# Patient Record
Sex: FEMALE | Race: OTHER | NOT HISPANIC OR LATINO | Employment: UNEMPLOYED | URBAN - METROPOLITAN AREA
[De-identification: names, ages, dates, MRNs, and addresses within clinical notes are randomized per-mention and may not be internally consistent; named-entity substitution may affect disease eponyms.]

---

## 2017-10-19 LAB
HBA1C MFR BLD: 5.5 % OF TOTAL HGB
TSH SERPL-ACNC: 1.88 MIU/L

## 2018-04-03 LAB
CHOLEST SERPL-MCNC: 264 MG/DL
CHOLEST/HDLC SERPL: 6.6 (CALC)
HDLC SERPL-MCNC: 40 MG/DL
LDLC SERPL CALC-MCNC: 174 MG/DL (CALC)
NONHDLC SERPL-MCNC: 224 MG/DL (CALC)
TRIGL SERPL-MCNC: 294 MG/DL

## 2018-11-20 ENCOUNTER — OFFICE VISIT (OUTPATIENT)
Dept: FAMILY MEDICINE CLINIC | Facility: CLINIC | Age: 53
End: 2018-11-20
Payer: COMMERCIAL

## 2018-11-20 VITALS
BODY MASS INDEX: 29.74 KG/M2 | WEIGHT: 157.4 LBS | DIASTOLIC BLOOD PRESSURE: 90 MMHG | TEMPERATURE: 98 F | HEART RATE: 90 BPM | RESPIRATION RATE: 16 BRPM | SYSTOLIC BLOOD PRESSURE: 132 MMHG

## 2018-11-20 DIAGNOSIS — J31.0 RHINITIS, UNSPECIFIED TYPE: Primary | ICD-10-CM

## 2018-11-20 PROCEDURE — 90471 IMMUNIZATION ADMIN: CPT

## 2018-11-20 PROCEDURE — 99213 OFFICE O/P EST LOW 20 MIN: CPT | Performed by: FAMILY MEDICINE

## 2018-11-20 PROCEDURE — 90686 IIV4 VACC NO PRSV 0.5 ML IM: CPT

## 2018-11-20 PROCEDURE — 1036F TOBACCO NON-USER: CPT | Performed by: FAMILY MEDICINE

## 2018-11-20 RX ORDER — OMEPRAZOLE 40 MG/1
CAPSULE, DELAYED RELEASE ORAL
COMMUNITY
Start: 2017-09-05 | End: 2018-11-30 | Stop reason: SDUPTHER

## 2018-11-20 RX ORDER — PREDNISONE 20 MG/1
40 TABLET ORAL DAILY
Qty: 14 TABLET | Refills: 0 | Status: SHIPPED | OUTPATIENT
Start: 2018-11-20 | End: 2018-11-27

## 2018-11-20 RX ORDER — LEVOTHYROXINE SODIUM 0.03 MG/1
TABLET ORAL
COMMUNITY
Start: 2010-03-31 | End: 2019-03-14 | Stop reason: SDUPTHER

## 2018-11-20 RX ORDER — FLUTICASONE PROPIONATE 50 MCG
1 SPRAY, SUSPENSION (ML) NASAL 2 TIMES DAILY
Qty: 16 G | Refills: 12 | Status: SHIPPED | OUTPATIENT
Start: 2018-11-20 | End: 2019-01-25 | Stop reason: SDUPTHER

## 2018-11-20 NOTE — PROGRESS NOTES
Assessment/Plan:    No problem-specific Assessment & Plan notes found for this encounter  Diagnoses and all orders for this visit:    Rhinitis, unspecified type  -     influenza vaccine, 6198-9730, quadrivalent, 0 5 mL, preservative-free (SYRINGE, SINGLE-DOSE VIAL), for adult and pediatric patients 3 yr+ (AFLURIA, FLUARIX, FLULAVAL, FLUZONE)  -     fluticasone (FLONASE) 50 mcg/act nasal spray; 1 spray into each nostril 2 (two) times a day  -     predniSONE 20 mg tablet; Take 2 tablets (40 mg total) by mouth daily for 7 days    Other orders  -     fexofenadine (ALLEGRA ODT) 30 MG disintegrating tablet; Take  -     levothyroxine 25 mcg tablet; 1 TAB DAILY  -     omeprazole (PriLOSEC) 40 MG capsule; TAKE 1 CAPSULE BY ORAL ROUTE EVERY DAY BEFORE A MEAL          Subjective:      Patient ID: Cris Dunham is a 52 y o  female  Patient comes for evaluation of recurring and chronic nasal congestion  She is using Allegra often on she is using a steroid nasal spray daily  She uses Afrin almost every day  The congestion is associated with headache and facial pressure  No ear symptoms and not much mucus  She does not have much postnasal drip  The following portions of the patient's history were reviewed and updated as appropriate: allergies, current medications, past family history, past medical history, past social history, past surgical history and problem list     Review of Systems   HENT: Positive for sinus pain and sinus pressure  Negative for ear pain, facial swelling, hearing loss, postnasal drip, rhinorrhea, sneezing and sore throat  Eyes: Negative for itching  Objective:  Vitals:    11/20/18 0837   BP: 132/90   BP Location: Left arm   Patient Position: Sitting   Cuff Size: Adult   Pulse: 90   Resp: 16   Temp: 98 °F (36 7 °C)   Weight: 71 4 kg (157 lb 6 4 oz)      Physical Exam   Constitutional: She appears well-developed and well-nourished     HENT:   Moderate nasal congestion ; no polyps / deviated septum   Eyes: Conjunctivae are normal    Neck: Neck supple  No thyromegaly present  Pulmonary/Chest: Effort normal    Skin: Skin is warm and dry  Psychiatric: Thought content normal          Most of the issue is chronic nasal congestion possibly aggravated by over use of topical decongestants

## 2018-11-20 NOTE — PATIENT INSTRUCTIONS
Continue Allegra but take it every day  Stop the Afrin and stop all other nasal sprays except as noted below  Continue the Flonase but use 1 spray in each nostril morning and evening every day  Take prednisone for 1 week   some Ponaris  nasal lubricant  Use this at least 2 or 3 times every day as a good nasal lubricant  Thing should be much better in 2 weeks I would recommend continuing the treatment for at least 3-4 months  Influenza Vaccine   WHAT YOU NEED TO KNOW:   What is the influenza vaccine? The influenza vaccine is an injection given to help prevent influenza (flu)  The flu is caused by a virus  The virus spreads from person to person through coughing and sneezing  Several types of viruses cause the flu  The viruses change over time, so new vaccines are made each year  The vaccine begins to protect you about 2 weeks after you get it  The flu shot usually injected into your upper arm  It may be given in your thigh  You may get a vaccine with a weak or dead virus  When should I get the influenza vaccine? The influenza vaccine is offered every year starting in September or October  Get the influenza vaccine as soon as it is available  Children 6 months to 6years old need 2 doses during the first year they get the vaccine  The 2 doses should be given at least 4 weeks apart  It is best if the same type of vaccine is given both times  The child can then receive 1 dose each year  Children 9 years or older should get 1 dose each year  Who should get the flu shot?    · Infants 6 months or older    · Any healthy adult who would like to decrease the risk for the flu    · Anyone living with or caring for children younger than 5 years     · Healthcare workers    · Anyone who lives in a long-term care facility    · Anyone who has chronic health problems, such as asthma, diabetes, or blood disorders    · Anyone who has a weak immune system    · Women who are or will be pregnant during the flu season  Who should not get the flu shot? If you have an egg allergy, ask your healthcare provider if it is safe to get the flu shot  You will need to be closely monitored by a healthcare provider while you receive the vaccine, and for an hour or more after  The following should not get the flu shot:  · Infants younger than 6 months     · Anyone who has had an allergic reaction to the flu shot    · Anyone who is sick or has a fever    · Anyone who received a diagnosis of Guillain-Barré syndrome within 6 weeks of getting a flu vaccine    · Anyone who is allergic to thimerosal (mercury)  What are the risks of the influenza vaccine? The flu shot may cause mild symptoms, such as a fever, headache, and muscle aches  It may also cause mild to moderate soreness or redness at the area where you were given the shot  You may still get the flu after you receive the influenza vaccine  If you are allergic to eggs, ask about an egg-free vaccine  You may have an allergic reaction to the vaccine  This can be life-threatening  Call 911 for any of the following:   · Your mouth and throat are swollen  · You are wheezing or have trouble breathing  · You have chest pain or your heart is beating faster than normal for you  · You feel like you are going to faint  When should I seek immediate care? · Your face is red or swollen  · You have hives that spread over your body  · You feel weak or dizzy  When should I contact my healthcare provider? · You have increased pain, redness, or swelling around the area where the shot was given  · You have questions or concerns about the influenza vaccine  CARE AGREEMENT:   You have the right to help plan your care  Learn about your health condition and how it may be treated  Discuss treatment options with your caregivers to decide what care you want to receive  You always have the right to refuse treatment  The above information is an  only   It is not intended as medical advice for individual conditions or treatments  Talk to your doctor, nurse or pharmacist before following any medical regimen to see if it is safe and effective for you  © 2017 [employer] Information is for End User's use only and may not be sold, redistributed or otherwise used for commercial purposes  All illustrations and images included in CareNotes® are the copyrighted property of [employer]  or [employer] Maldon[employer]

## 2018-11-30 DIAGNOSIS — K21.9 GASTROESOPHAGEAL REFLUX DISEASE WITHOUT ESOPHAGITIS: Primary | ICD-10-CM

## 2018-11-30 RX ORDER — OMEPRAZOLE 40 MG/1
40 CAPSULE, DELAYED RELEASE ORAL DAILY
Qty: 30 CAPSULE | Refills: 6 | Status: SHIPPED | OUTPATIENT
Start: 2018-11-30 | End: 2019-05-20 | Stop reason: SDUPTHER

## 2018-12-26 ENCOUNTER — OFFICE VISIT (OUTPATIENT)
Dept: FAMILY MEDICINE CLINIC | Facility: CLINIC | Age: 53
End: 2018-12-26
Payer: COMMERCIAL

## 2018-12-26 VITALS
DIASTOLIC BLOOD PRESSURE: 90 MMHG | WEIGHT: 159.1 LBS | TEMPERATURE: 98.8 F | SYSTOLIC BLOOD PRESSURE: 130 MMHG | BODY MASS INDEX: 30.04 KG/M2 | HEIGHT: 61 IN | OXYGEN SATURATION: 97 % | HEART RATE: 103 BPM

## 2018-12-26 DIAGNOSIS — R05.9 COUGH: Primary | ICD-10-CM

## 2018-12-26 PROCEDURE — 99213 OFFICE O/P EST LOW 20 MIN: CPT | Performed by: FAMILY MEDICINE

## 2018-12-26 PROCEDURE — 1036F TOBACCO NON-USER: CPT | Performed by: FAMILY MEDICINE

## 2018-12-26 RX ORDER — AMOXICILLIN AND CLAVULANATE POTASSIUM 875; 125 MG/1; MG/1
1 TABLET, FILM COATED ORAL EVERY 12 HOURS SCHEDULED
Qty: 14 TABLET | Refills: 0 | Status: SHIPPED | OUTPATIENT
Start: 2018-12-26 | End: 2019-01-02

## 2018-12-26 RX ORDER — ALBUTEROL SULFATE 90 UG/1
2 AEROSOL, METERED RESPIRATORY (INHALATION) EVERY 4 HOURS PRN
Qty: 1 INHALER | Refills: 12 | Status: SHIPPED | OUTPATIENT
Start: 2018-12-26 | End: 2019-09-18 | Stop reason: ALTCHOICE

## 2018-12-26 RX ORDER — LORATADINE 10 MG/1
TABLET ORAL EVERY 24 HOURS
COMMUNITY
Start: 2016-03-31 | End: 2019-09-18 | Stop reason: ALTCHOICE

## 2018-12-26 NOTE — PROGRESS NOTES
Assessment/Plan:    No problem-specific Assessment & Plan notes found for this encounter  Diagnoses and all orders for this visit:    Cough  -     amoxicillin-clavulanate (AUGMENTIN) 875-125 mg per tablet; Take 1 tablet by mouth every 12 (twelve) hours for 7 days  -     albuterol (PROAIR HFA) 90 mcg/act inhaler; Inhale 2 puffs every 4 (four) hours as needed for wheezing    Other orders  -     loratadine (CLARITIN) 10 mg tablet; every 24 hours          Subjective:      Patient ID: Dany Santana is a 52 y o  female  Patient comes with signs and symptoms of sinus infection for the past 4-5 days  No fever having discolored mucus from the sinuses  Some cough and phlegm as well  So far her asthma has not flared up  The following portions of the patient's history were reviewed and updated as appropriate: allergies, current medications, past family history, past medical history, past social history, past surgical history and problem list     Review of Systems   Constitutional: Negative for chills and fever  HENT: Positive for congestion, ear pain, rhinorrhea and sinus pain  Negative for sore throat  Eyes: Negative for visual disturbance  Respiratory: Positive for cough  Negative for shortness of breath and wheezing  Cardiovascular: Negative for chest pain  Gastrointestinal: Negative for constipation and diarrhea  Skin: Negative for rash  Objective:  Vitals:    12/26/18 1003   BP: 130/90   BP Location: Left arm   Patient Position: Sitting   Cuff Size: Adult   Pulse: 103   Temp: 98 8 °F (37 1 °C)   TempSrc: Temporal   SpO2: 97%   Weight: 72 2 kg (159 lb 1 6 oz)   Height: 5' 1" (1 549 m)      Physical Exam   Constitutional: She appears well-developed and well-nourished  HENT:   r o  Media    Eyes: Conjunctivae are normal    Pulmonary/Chest: Effort normal  She has wheezes  Skin: Skin is warm and dry     Psychiatric: Thought content normal          O  Media R side / sinusuitis Anti bx / Alok Porter

## 2018-12-26 NOTE — PATIENT INSTRUCTIONS
Finished a 1 week of antibiotics  You can add Afrin nasal spray for the facial pressure and sinus pain  It will also help the ear doctor opened up  Robitussin DM is good for cough  And Advils or Tylenol as are good for fever and pain  It would probably be good advice to use the inhaler maybe 3 times a day for a few days even if you do not feel tight

## 2019-01-23 ENCOUNTER — TELEPHONE (OUTPATIENT)
Dept: FAMILY MEDICINE CLINIC | Facility: CLINIC | Age: 54
End: 2019-01-23

## 2019-01-25 ENCOUNTER — OFFICE VISIT (OUTPATIENT)
Dept: FAMILY MEDICINE CLINIC | Facility: CLINIC | Age: 54
End: 2019-01-25
Payer: COMMERCIAL

## 2019-01-25 VITALS
TEMPERATURE: 97.6 F | BODY MASS INDEX: 29.62 KG/M2 | HEIGHT: 61 IN | SYSTOLIC BLOOD PRESSURE: 137 MMHG | RESPIRATION RATE: 16 BRPM | WEIGHT: 156.9 LBS | HEART RATE: 84 BPM | DIASTOLIC BLOOD PRESSURE: 78 MMHG

## 2019-01-25 DIAGNOSIS — J31.0 RHINITIS, UNSPECIFIED TYPE: ICD-10-CM

## 2019-01-25 PROCEDURE — 99213 OFFICE O/P EST LOW 20 MIN: CPT | Performed by: FAMILY MEDICINE

## 2019-01-25 PROCEDURE — 3008F BODY MASS INDEX DOCD: CPT | Performed by: FAMILY MEDICINE

## 2019-01-25 PROCEDURE — 1036F TOBACCO NON-USER: CPT | Performed by: FAMILY MEDICINE

## 2019-01-25 RX ORDER — OMEPRAZOLE 40 MG/1
CAPSULE, DELAYED RELEASE ORAL
Refills: 6 | COMMUNITY
Start: 2019-01-01 | End: 2019-01-25 | Stop reason: SDUPTHER

## 2019-01-25 RX ORDER — FLUTICASONE PROPIONATE 50 MCG
1 SPRAY, SUSPENSION (ML) NASAL 2 TIMES DAILY
Qty: 16 G | Refills: 12 | Status: SHIPPED | OUTPATIENT
Start: 2019-01-25 | End: 2020-04-11 | Stop reason: SDUPTHER

## 2019-01-25 NOTE — PROGRESS NOTES
Assessment/Plan:    No problem-specific Assessment & Plan notes found for this encounter  Diagnoses and all orders for this visit:    Rhinitis, unspecified type  -     fluticasone (FLONASE) 50 mcg/act nasal spray; 1 spray into each nostril 2 (two) times a day    Other orders  -     Discontinue: omeprazole (PriLOSEC) 40 MG capsule; TAKE 1 CAPSULE BY MOUTH EVERY DAY          Subjective:      Patient ID: Brant Rodrigues is a 52 y o  female  Patient comes for review of sinus congestion  She did well having finished the antibiotic her sinusitis symptoms improved promptly upon stopping the medicines of symptoms began to come back she is congested has difficulty breathing through her nose notices pressure on the cheeks above the eyes   Afrin does relieve the symptoms but she can't use that for ever  The following portions of the patient's history were reviewed and updated as appropriate: allergies, current medications, past family history, past medical history, past social history, past surgical history and problem list     Review of Systems   HENT: Positive for postnasal drip, sinus pain and sinus pressure  Negative for rhinorrhea  Respiratory: Negative for cough  Cardiovascular: Negative for chest pain  Objective:  Vitals:    01/25/19 0730   BP: 137/78   BP Location: Left arm   Patient Position: Sitting   Cuff Size: Standard   Pulse: 84   Resp: 16   Temp: 97 6 °F (36 4 °C)   Weight: 71 2 kg (156 lb 14 4 oz)   Height: 5' 1" (1 549 m)      Physical Exam   Constitutional: She appears well-developed and well-nourished  HENT:   Head: Normocephalic  Right Ear: External ear normal    Left Ear: External ear normal    Mouth/Throat: Oropharynx is clear and moist    Nasal congestion is noted bilateral no polyps are seen no adenopathy  Eyes: Conjunctivae are normal    Pulmonary/Chest: Effort normal    Skin: Skin is warm and dry     Psychiatric: Thought content normal          I believe the major issues here are chronic rhinitis there may be a component of allergy but there is also certainly a vasomotor component  We will start with humidification and anti-inflammatory treatment and possibly add oral anti allergy medicines later

## 2019-01-25 NOTE — PATIENT INSTRUCTIONS
Use Afrin spray for the next 3 or 4 days  Simultaneously use the new prescriptions spray 1 spray each nostril twice daily  You should be much improved within a week  Call me if that is not the case    Continue the prescription spray for 2 months and then try going off of it      You can also use the saline solution as often as necessary to keep the nose moist

## 2019-03-14 DIAGNOSIS — R51.9 NONINTRACTABLE HEADACHE, UNSPECIFIED CHRONICITY PATTERN, UNSPECIFIED HEADACHE TYPE: Primary | ICD-10-CM

## 2019-03-14 RX ORDER — LEVOTHYROXINE SODIUM 0.03 MG/1
25 TABLET ORAL DAILY
Qty: 90 TABLET | Refills: 3 | Status: SHIPPED | OUTPATIENT
Start: 2019-03-14 | End: 2020-03-09

## 2019-03-15 ENCOUNTER — TREATMENT (OUTPATIENT)
Dept: FAMILY MEDICINE CLINIC | Facility: CLINIC | Age: 54
End: 2019-03-15

## 2019-03-15 ENCOUNTER — TELEPHONE (OUTPATIENT)
Dept: FAMILY MEDICINE CLINIC | Facility: CLINIC | Age: 54
End: 2019-03-15

## 2019-03-15 DIAGNOSIS — R05.9 COUGH: Primary | ICD-10-CM

## 2019-03-15 RX ORDER — GUAIFENESIN AND CODEINE PHOSPHATE 100; 10 MG/5ML; MG/5ML
SOLUTION ORAL
Qty: 120 ML | Refills: 1 | Status: SHIPPED | OUTPATIENT
Start: 2019-03-15 | End: 2019-09-18 | Stop reason: ALTCHOICE

## 2019-03-15 NOTE — TELEPHONE ENCOUNTER
Patient called she said that she isn't feeling good for 4 days now has a cough she said that it is getting worse couldn't sleep last night she would like to know if she can be seen today or what to recommend for her she can be reached at [phone]  TY

## 2019-03-15 NOTE — TELEPHONE ENCOUNTER
So long as no fever I will call in a codeine-containing cough medicine to use over the weekend if not improving somewhat by Monday or Tuesday we will see her

## 2019-03-15 NOTE — TELEPHONE ENCOUNTER
Pt called back and was given the message regarding the medication and if not feeling well to call back  Pt uses [facility] on [address]  Pharmacy is on file

## 2019-03-17 RX ORDER — GUAIFENESIN AND CODEINE PHOSPHATE 100; 10 MG/5ML; MG/5ML
SOLUTION ORAL
Qty: 120 ML | Refills: 0 | Status: SHIPPED | OUTPATIENT
Start: 2019-03-17 | End: 2019-09-18 | Stop reason: ALTCHOICE

## 2019-05-20 DIAGNOSIS — K21.9 GASTROESOPHAGEAL REFLUX DISEASE WITHOUT ESOPHAGITIS: ICD-10-CM

## 2019-05-20 RX ORDER — OMEPRAZOLE 40 MG/1
40 CAPSULE, DELAYED RELEASE ORAL DAILY
Qty: 30 CAPSULE | Refills: 6 | Status: SHIPPED | OUTPATIENT
Start: 2019-05-20 | End: 2019-12-19 | Stop reason: SDUPTHER

## 2019-08-29 ENCOUNTER — TELEPHONE (OUTPATIENT)
Dept: FAMILY MEDICINE CLINIC | Facility: CLINIC | Age: 54
End: 2019-08-29

## 2019-08-29 NOTE — TELEPHONE ENCOUNTER
Pt called in states that its been a while since she's had blood work done and wants to know if Monica Patricio would like her to have done? Please advise thank you

## 2019-09-18 ENCOUNTER — OFFICE VISIT (OUTPATIENT)
Dept: FAMILY MEDICINE CLINIC | Facility: CLINIC | Age: 54
End: 2019-09-18
Payer: COMMERCIAL

## 2019-09-18 ENCOUNTER — TREATMENT (OUTPATIENT)
Dept: FAMILY MEDICINE CLINIC | Facility: CLINIC | Age: 54
End: 2019-09-18

## 2019-09-18 VITALS
HEART RATE: 77 BPM | DIASTOLIC BLOOD PRESSURE: 80 MMHG | BODY MASS INDEX: 29 KG/M2 | OXYGEN SATURATION: 96 % | HEIGHT: 62 IN | WEIGHT: 157.6 LBS | SYSTOLIC BLOOD PRESSURE: 122 MMHG | TEMPERATURE: 97.3 F

## 2019-09-18 DIAGNOSIS — J31.0 CHRONIC RHINITIS: ICD-10-CM

## 2019-09-18 DIAGNOSIS — E78.49 OTHER HYPERLIPIDEMIA: Chronic | ICD-10-CM

## 2019-09-18 DIAGNOSIS — R03.0 ELEVATED BLOOD-PRESSURE READING WITHOUT DIAGNOSIS OF HYPERTENSION: ICD-10-CM

## 2019-09-18 DIAGNOSIS — E03.9 ACQUIRED HYPOTHYROIDISM: ICD-10-CM

## 2019-09-18 DIAGNOSIS — K21.9 GASTROESOPHAGEAL REFLUX DISEASE WITHOUT ESOPHAGITIS: Chronic | ICD-10-CM

## 2019-09-18 DIAGNOSIS — Z23 IMMUNIZATION DUE: Primary | ICD-10-CM

## 2019-09-18 DIAGNOSIS — J30.1 ALLERGIC RHINITIS DUE TO POLLEN, UNSPECIFIED SEASONALITY: ICD-10-CM

## 2019-09-18 PROBLEM — R05.9 COUGH: Status: RESOLVED | Noted: 2018-12-26 | Resolved: 2019-09-18

## 2019-09-18 LAB
CHOLEST SERPL-MCNC: 255 MG/DL (ref 50–200)
GLUCOSE P FAST SERPL-MCNC: 96 MG/DL (ref 65–99)
HDLC SERPL-MCNC: 45 MG/DL (ref 40–60)
LDLC SERPL CALC-MCNC: 166 MG/DL (ref 0–100)
NONHDLC SERPL-MCNC: 210 MG/DL
TRIGL SERPL-MCNC: 218 MG/DL
TSH SERPL DL<=0.05 MIU/L-ACNC: 2.33 UIU/ML (ref 0.36–3.74)

## 2019-09-18 PROCEDURE — 84443 ASSAY THYROID STIM HORMONE: CPT | Performed by: FAMILY MEDICINE

## 2019-09-18 PROCEDURE — 36415 COLL VENOUS BLD VENIPUNCTURE: CPT | Performed by: FAMILY MEDICINE

## 2019-09-18 PROCEDURE — 90682 RIV4 VACC RECOMBINANT DNA IM: CPT

## 2019-09-18 PROCEDURE — 99396 PREV VISIT EST AGE 40-64: CPT | Performed by: FAMILY MEDICINE

## 2019-09-18 PROCEDURE — 80061 LIPID PANEL: CPT | Performed by: FAMILY MEDICINE

## 2019-09-18 PROCEDURE — 82947 ASSAY GLUCOSE BLOOD QUANT: CPT | Performed by: FAMILY MEDICINE

## 2019-09-18 PROCEDURE — 90471 IMMUNIZATION ADMIN: CPT

## 2019-09-18 NOTE — PROGRESS NOTES
Assessment/Plan:    No problem-specific Assessment & Plan notes found for this encounter  Diagnoses and all orders for this visit:    Immunization due  -     influenza vaccine, 2159-2830, quadrivalent, recombinant, PF, 0 5 mL, for patients 18 yr+ (FLUBLOK)    Allergic rhinitis due to pollen, unspecified seasonality    Chronic rhinitis    Elevated blood-pressure reading without diagnosis of hypertension    Acquired hypothyroidism  -     Lipid panel  -     TSH, 3rd generation  -     Glucose, fasting    Gastroesophageal reflux disease without esophagitis  -     Lipid panel  -     TSH, 3rd generation  -     Glucose, fasting    Other hyperlipidemia          Subjective:      Patient ID: Judy Winters is a 62 y o  female  Wellness     R TMJ sx since recent implant ;     PAP/MAMMO AND COLON UTD ;     FH : DM /     Yoga 2/ week       The following portions of the patient's history were reviewed and updated as appropriate: allergies, current medications, past family history, past medical history, past social history, past surgical history and problem list     Review of Systems   Constitutional: Negative for activity change and appetite change  HENT: Negative for voice change  Eyes: Negative for visual disturbance  Respiratory: Negative for chest tightness and shortness of breath  Cardiovascular: Negative for chest pain, palpitations and leg swelling  Gastrointestinal: Negative for abdominal pain, blood in stool, constipation and diarrhea  Genitourinary: Negative for dysuria, vaginal bleeding and vaginal discharge  Skin: Negative for rash  Neurological: Negative for dizziness  Psychiatric/Behavioral: Negative for dysphoric mood           Objective:  Vitals:    09/18/19 0728   BP: 122/80   BP Location: Left arm   Patient Position: Sitting   Cuff Size: Adult   Pulse: 77   Temp: (!) 97 3 °F (36 3 °C)   TempSrc: Temporal   SpO2: 96%   Weight: 71 5 kg (157 lb 9 6 oz)   Height: 5' 2" (1 575 m) Physical Exam   Constitutional: She appears well-developed and well-nourished  HENT:   Head: Normocephalic and atraumatic  Eyes: Conjunctivae are normal    Neck: Neck supple  No thyromegaly present  Cardiovascular: Normal rate, regular rhythm, normal heart sounds and intact distal pulses  No murmur heard  Pulmonary/Chest: Effort normal and breath sounds normal  No respiratory distress  Musculoskeletal: She exhibits no edema  Lymphadenopathy:     She has no cervical adenopathy  Skin: Skin is warm and dry  Psychiatric: She has a normal mood and affect  Her behavior is normal          Patient's chronic problems that were reviewed today are stable  Meds reviewed and no changes made  Appropriate labs and imaging were ordered  Preventive measures appropriate for age and sex were reviewed with patient  Immunizations were updated as appropriate

## 2019-09-18 NOTE — PATIENT INSTRUCTIONS
For the jaw joint pain try to avoid wide opening of your mouth to bite into fruits or very hard items  Also avoid chewing gum or real chewy candies that make you chew frequently  Take 2 Aleve twice a day for 5 days  That should calm down the jaw joint  That treatment is also good for the left wrist when you needed  On days when you take Aleve make sure you take the omeprazole to protect the stomach  You may try to reduce the Prilosec to every other day and see how it goes  You may use Afrin nasal spray on occasion for bad days with congestion in her nose  If the joint pain in the jaw does not get better after my treatment let me know I have other ideas  CURRENT [employer] TIPS ON EXERCISE:    IF YOU HAVE CONDITIONS THAT PREVENT AEROBIC EXERCISE:    WALK 30 MINUTES AT LEAST 5 DAYS PER WEEK; MAY BREAK IT UP INTO 10-  15 MINUTE SESSIONS   GET SOME RESISTANCE EXERCISES USING THE MAJOR MUSCLE GROUPS 2-3 TIMES PER WEEK     IF YOU ARE PHYSICALLY ABLE:    TRY TO GET 10,000 STEPS 3 TIMES PER WEEK  PLUS  GO "ONLINE" TO CHECK [employer] HEART RATE FOR YOUR AGE AND DO AEROBIC EXERCISES (JOG/STATIONARY BIKE/ELLIPTICAL) FOR 20-30 MINUTES 2-3 TIMES PER WEEK  IF YOU HAVE NEVER HAD A TDAP SHOT (TETANUS/DIPHTHERIA/PERTUSSIS)  TALK TO YOUR PHARMACIST ABOUT GETTING ONE : GOOD FOR 10 YRS:ESPECIALLY IMPORTATN IF YOU HAVE YOUNG CHILDREN OR GRANDCHILDREN    GET A YEARLY FLU SHOT IN THE FALL : IF OVER 65 GET "HIGH DOSE"     ASK PHARMACIST ABOUT "SHINGRIX" THE NEW SHINGLES VACCINE IF OVER AGE 50    You should take a calcium and vitamin-D supplement prevent osteoporosis  Start with Caltrate D 1 a day for 1 month  And then go to twice a day  Sometimes calcium can be constipating so take plenty of fluids fresh fruits fresh vegetables and high-fiber diet to prevent trouble

## 2019-09-20 ENCOUNTER — TELEPHONE (OUTPATIENT)
Dept: FAMILY MEDICINE CLINIC | Facility: CLINIC | Age: 54
End: 2019-09-20

## 2019-09-20 NOTE — TELEPHONE ENCOUNTER
Labs are all fairly good  Cholesterol is a bit high but no worse than before  Does not require a statin drug  Maintain good physical exercising keep her weight low

## 2019-12-19 DIAGNOSIS — K21.9 GASTROESOPHAGEAL REFLUX DISEASE WITHOUT ESOPHAGITIS: ICD-10-CM

## 2019-12-19 RX ORDER — OMEPRAZOLE 40 MG/1
40 CAPSULE, DELAYED RELEASE ORAL DAILY
Qty: 30 CAPSULE | Refills: 6 | Status: SHIPPED | OUTPATIENT
Start: 2019-12-19 | End: 2020-06-10

## 2020-03-09 DIAGNOSIS — R51.9 NONINTRACTABLE HEADACHE, UNSPECIFIED CHRONICITY PATTERN, UNSPECIFIED HEADACHE TYPE: ICD-10-CM

## 2020-03-09 RX ORDER — LEVOTHYROXINE SODIUM 0.03 MG/1
TABLET ORAL
Qty: 90 TABLET | Refills: 3 | Status: SHIPPED | OUTPATIENT
Start: 2020-03-09 | End: 2021-02-28

## 2020-04-10 ENCOUNTER — TELEPHONE (OUTPATIENT)
Dept: FAMILY MEDICINE CLINIC | Facility: CLINIC | Age: 55
End: 2020-04-10

## 2020-04-11 DIAGNOSIS — J31.0 RHINITIS, UNSPECIFIED TYPE: ICD-10-CM

## 2020-04-11 RX ORDER — FLUTICASONE PROPIONATE 50 MCG
1 SPRAY, SUSPENSION (ML) NASAL 2 TIMES DAILY
Qty: 16 G | Refills: 12 | Status: SHIPPED | OUTPATIENT
Start: 2020-04-11 | End: 2021-03-16

## 2020-06-10 DIAGNOSIS — K21.9 GASTROESOPHAGEAL REFLUX DISEASE WITHOUT ESOPHAGITIS: ICD-10-CM

## 2020-06-10 RX ORDER — OMEPRAZOLE 40 MG/1
CAPSULE, DELAYED RELEASE ORAL
Qty: 90 CAPSULE | Refills: 2 | Status: SHIPPED | OUTPATIENT
Start: 2020-06-10 | End: 2021-02-28

## 2020-09-21 ENCOUNTER — OFFICE VISIT (OUTPATIENT)
Dept: FAMILY MEDICINE CLINIC | Facility: CLINIC | Age: 55
End: 2020-09-21
Payer: COMMERCIAL

## 2020-09-21 VITALS
DIASTOLIC BLOOD PRESSURE: 90 MMHG | OXYGEN SATURATION: 97 % | TEMPERATURE: 96.6 F | BODY MASS INDEX: 29.64 KG/M2 | WEIGHT: 161.1 LBS | SYSTOLIC BLOOD PRESSURE: 140 MMHG | HEIGHT: 62 IN | HEART RATE: 64 BPM

## 2020-09-21 DIAGNOSIS — R03.0 ELEVATED BLOOD-PRESSURE READING WITHOUT DIAGNOSIS OF HYPERTENSION: ICD-10-CM

## 2020-09-21 DIAGNOSIS — Z12.31 ENCOUNTER FOR SCREENING MAMMOGRAM FOR MALIGNANT NEOPLASM OF BREAST: Primary | ICD-10-CM

## 2020-09-21 DIAGNOSIS — E78.49 OTHER HYPERLIPIDEMIA: Chronic | ICD-10-CM

## 2020-09-21 DIAGNOSIS — E03.9 ACQUIRED HYPOTHYROIDISM: ICD-10-CM

## 2020-09-21 DIAGNOSIS — Z23 IMMUNIZATION DUE: ICD-10-CM

## 2020-09-21 DIAGNOSIS — K21.9 GASTROESOPHAGEAL REFLUX DISEASE WITHOUT ESOPHAGITIS: Chronic | ICD-10-CM

## 2020-09-21 DIAGNOSIS — Z11.59 SCREENING FOR VIRAL DISEASE: ICD-10-CM

## 2020-09-21 DIAGNOSIS — J31.0 CHRONIC RHINITIS: ICD-10-CM

## 2020-09-21 PROCEDURE — 83036 HEMOGLOBIN GLYCOSYLATED A1C: CPT | Performed by: FAMILY MEDICINE

## 2020-09-21 PROCEDURE — 86803 HEPATITIS C AB TEST: CPT | Performed by: FAMILY MEDICINE

## 2020-09-21 PROCEDURE — 90471 IMMUNIZATION ADMIN: CPT

## 2020-09-21 PROCEDURE — 3725F SCREEN DEPRESSION PERFORMED: CPT | Performed by: FAMILY MEDICINE

## 2020-09-21 PROCEDURE — 90682 RIV4 VACC RECOMBINANT DNA IM: CPT

## 2020-09-21 PROCEDURE — 87389 HIV-1 AG W/HIV-1&-2 AB AG IA: CPT | Performed by: FAMILY MEDICINE

## 2020-09-21 PROCEDURE — 84443 ASSAY THYROID STIM HORMONE: CPT | Performed by: FAMILY MEDICINE

## 2020-09-21 PROCEDURE — 99396 PREV VISIT EST AGE 40-64: CPT | Performed by: FAMILY MEDICINE

## 2020-09-21 PROCEDURE — 80061 LIPID PANEL: CPT | Performed by: FAMILY MEDICINE

## 2020-09-21 PROCEDURE — 36415 COLL VENOUS BLD VENIPUNCTURE: CPT | Performed by: FAMILY MEDICINE

## 2020-09-21 NOTE — PATIENT INSTRUCTIONS
Weight Management   AMBULATORY CARE:   Why it is important to manage your weight:  Being overweight increases your risk of health conditions such as heart disease, high blood pressure, type 2 diabetes, and certain types of cancer  It can also increase your risk for osteoarthritis, sleep apnea, and other respiratory problems  Aim for a slow, steady weight loss  Even a small amount of weight loss can lower your risk of health problems  How to lose weight safely:  A safe and healthy way to lose weight is to eat fewer calories and get regular exercise  You can lose up about 1 pound a week by decreasing the number of calories you eat by 500 calories each day  You can decrease calories by eating smaller portion sizes or by cutting out high-calorie foods  Read labels to find out how many calories are in the foods you eat  You can also burn calories with exercise such as walking, swimming, or biking  You will be more likely to keep weight off if you make these changes part of your lifestyle  Healthy meal plan for weight management:  A healthy meal plan includes a variety of foods, contains fewer calories, and helps you stay healthy  A healthy meal plan includes the following:  · Eat whole-grain foods more often  A healthy meal plan should contain fiber  Fiber is the part of grains, fruits, and vegetables that is not broken down by your body  Whole-grain foods are healthy and provide extra fiber in your diet  Some examples of whole-grain foods are whole-wheat breads and pastas, oatmeal, brown rice, and bulgur  · Eat a variety of vegetables every day  Include dark, leafy greens such as spinach, kale, kelsea greens, and mustard greens  Eat yellow and orange vegetables such as carrots, sweet potatoes, and winter squash  · Eat a variety of fruits every day  Choose fresh or canned fruit (canned in its own juice or light syrup) instead of juice  Fruit juice has very little or no fiber  · Eat low-fat dairy foods  Drink fat-free (skim) milk or 1% milk  Eat fat-free yogurt and low-fat cottage cheese  Try low-fat cheeses such as mozzarella and other reduced-fat cheeses  · Choose meat and other protein foods that are low in fat  Choose beans or other legumes such as split peas or lentils  Choose fish, skinless poultry (chicken or turkey), or lean cuts of red meat (beef or pork)  Before you cook meat or poultry, cut off any visible fat  · Use less fat and oil  Try baking foods instead of frying them  Add less fat, such as margarine, sour cream, regular salad dressing and mayonnaise to foods  Eat fewer high-fat foods  Some examples of high-fat foods include french fries, doughnuts, ice cream, and cakes  · Eat fewer sweets  Limit foods and drinks that are high in sugar  This includes candy, cookies, regular soda, and sweetened drinks  Ways to decrease calories:   · Eat smaller portions  ¨ Use a small plate with smaller servings  ¨ Do not eat second helpings  ¨ When you eat at a restaurant, ask for a box and place half of your meal in the box before you eat  ¨ Share an entrée with someone else  · Replace high-calorie snacks with healthy, low-calorie snacks  ¨ Choose fresh fruit, vegetables, fat-free rice cakes, or air-popped popcorn instead of potato chips, nuts, or chocolate  ¨ Choose water or calorie-free drinks instead of soda or sweetened drinks  · Eat regular meals  Skipping meals can lead to overeating later in the day  Eat a healthy snack in place of a meal if you do not have time to eat a regular meal      · Do not shop for groceries when you are hungry  You may be more likely to make unhealthy food choices  Take a grocery list of healthy foods and shop after you have eaten  Exercise:  Exercise at least 30 minutes per day on most days of the week  Some examples of exercise include walking, biking, dancing, and swimming   You can also fit in more physical activity by taking the stairs instead of the elevator or parking farther away from stores  Ask your healthcare provider about the best exercise plan for you  Other things to consider as you try to lose weight:   · Be aware of situations that may give you the urge to overeat, such as eating while watching television  Find ways to avoid these situations  For example, read a book, go for a walk, or do crafts  · Meet with a weight loss support group or friends who are also trying to lose weight  This may help you stay motivated to continue working on your weight loss goals  © 2017 [facility] [facility] Information is for End User's use only and may not be sold, redistributed or otherwise used for commercial purposes  All illustrations and images included in CareNotes® are the copyrighted property of A D A M [employer] Inc  or Perry Maldonado  The above information is an  only  It is not intended as medical advice for individual conditions or treatments  Talk to your doctor, nurse or pharmacist before following any medical regimen to see if it is safe and effective for you  Heart Healthy Diet   AMBULATORY CARE:   A heart healthy diet  is an eating plan low in total fat, unhealthy fats, and sodium (salt)  A heart healthy diet helps decrease your risk for heart disease and stroke  Limit the amount of fat you eat to 25% to 35% of your total daily calories  Limit sodium to less than 2,300 mg each day  Healthy fats:  Healthy fats can help improve cholesterol levels  The risk for heart disease is decreased when cholesterol levels are normal  Choose healthy fats, such as the following:  · Unsaturated fat  is found in foods such as soybean, canola, olive, corn, and safflower oils  It is also found in soft tub margarine that is made with liquid vegetable oil  · Omega-3 fat  is found in certain fish, such as salmon, tuna, and trout, and in walnuts and flaxseed    Unhealthy fats:  Unhealthy fats can cause unhealthy cholesterol levels in your blood and increase your risk of heart disease  Limit unhealthy fats, such as the following:  · Cholesterol  is found in animal foods, such as eggs and lobster, and in dairy products made from whole milk  Limit cholesterol to less than 300 milligrams (mg) each day  You may need to limit cholesterol to 200 mg each day if you have heart disease  · Saturated fat  is found in meats, such as steele and hamburger  It is also found in chicken or turkey skin, whole milk, and butter  Limit saturated fat to less than 7% of your total daily calories  Limit saturated fat to less than 6% if you have heart disease or are at increased risk for it  · Trans fat  is found in packaged foods, such as potato chips and cookies  It is also in hard margarine, some fried foods, and shortening  Avoid trans fats as much as possible    Heart healthy foods and drinks to include:  Ask your dietitian or healthcare provider how many servings to have from each of the following food groups:  · Grains:      ¨ Whole-wheat breads, cereals, and pastas, and brown rice    ¨ Low-fat, low-sodium crackers and chips    · Vegetables:      ¨ Broccoli, green beans, green peas, and spinach    ¨ Collards, kale, and lima beans    ¨ Carrots, sweet potatoes, tomatoes, and peppers    ¨ Canned vegetables with no salt added    · Fruits:      ¨ Bananas, peaches, pears, and pineapple    ¨ Grapes, raisins, and dates    ¨ Oranges, tangerines, grapefruit, orange juice, and grapefruit juice    ¨ Apricots, mangoes, melons, and papaya    ¨ Raspberries and strawberries    ¨ Canned fruit with no added sugar    · Low-fat dairy products:      ¨ Nonfat (skim) milk, 1% milk, and low-fat almond, cashew, or soy milks fortified with calcium    ¨ Low-fat cheese, regular or frozen yogurt, and cottage cheese    · Meats and proteins , such as lean cuts of beef and pork (loin, leg, round), skinless chicken and turkey, legumes, soy products, egg whites, and nuts  Foods and drinks to limit or avoid:  Ask your dietitian or healthcare provider about these and other foods that are high in unhealthy fat, sodium, and sugar:  · Snack or packaged foods , such as frozen dinners, cookies, macaroni and cheese, and cereals with more than 300 mg of sodium per serving    · Canned or dry mixes  for cakes, soups, sauces, or gravies    · Vegetables with added sodium , such as instant potatoes, vegetables with added sauces, or regular canned vegetables    · Other foods high in sodium , such as ketchup, barbecue sauce, salad dressing, pickles, olives, soy sauce, and miso    · High-fat dairy foods  such as whole or 2% milk, cream cheese, or sour cream, and cheeses     · High-fat protein foods  such as high-fat cuts of beef (T-bone steaks, ribs), chicken or turkey with skin, and organ meats, such as liver    · Cured or smoked meats , such as hot dogs, steele, and sausage    · Unhealthy fats and oils , such as butter, stick margarine, shortening, and cooking oils such as coconut or palm oil    · Food and drinks high in sugar , such as soft drinks (soda), sports drinks, sweetened tea, candy, cake, cookies, pies, and doughnuts  Other diet guidelines to follow:   · Eat more foods containing omega-3 fats  Eat fish high in omega-3 fats at least 2 times a week  · Limit alcohol  Too much alcohol can damage your heart and raise your blood pressure  Women should limit alcohol to 1 drink a day  Men should limit alcohol to 2 drinks a day  A drink of alcohol is 12 ounces of beer, 5 ounces of wine, or 1½ ounces of liquor  · Choose low-sodium foods  High-sodium foods can lead to high blood pressure  Add little or no salt to food you prepare  Use herbs and spices in place of salt  · Eat more fiber  to help lower cholesterol levels  Eat at least 5 servings of fruits and vegetables each day  Eat 3 ounces of whole-grain foods each day  Legumes (beans) are also a good source of fiber    Lifestyle guidelines: · Do not smoke  Nicotine and other chemicals in cigarettes and cigars can cause lung and heart damage  Ask your healthcare provider for information if you currently smoke and need help to quit  E-cigarettes or smokeless tobacco still contain nicotine  Talk to your healthcare provider before you use these products  · Exercise regularly  to help you maintain a healthy weight and improve your blood pressure and cholesterol levels  Ask your healthcare provider about the best exercise plan for you  Do not start an exercise program without asking your healthcare provider  Follow up with your healthcare provider as directed:  Write down your questions so you remember to ask them during your visits  © 2017 [address] [employer] Information is for End User's use only and may not be sold, redistributed or otherwise used for commercial purposes  All illustrations and images included in CareNotes® are the copyrighted property of [employer] Inc  or Perry Maldonado  The above information is an  only  It is not intended as medical advice for individual conditions or treatments  Talk to your doctor, nurse or pharmacist before following any medical regimen to see if it is safe and effective for you  Calorie Counting Diet   WHAT YOU NEED TO KNOW:   What is a calorie counting diet? It is a meal plan based on counting calories each day to reach a healthy body weight  You will need to eat fewer calories if you are trying to lose weight  Weight loss may decrease your risk for certain health problems or improve your health if you have health problems  Some of these health problems include heart disease, high blood pressure, and diabetes  What foods should I avoid? Your dietitian will tell you if you need to avoid certain foods based on your body weight and health condition  You may need to avoid high-fat foods if you are at risk for or have heart disease   You may need to eat fewer foods from the breads and starches food group if you have diabetes  How many calories are in foods? The following is a list of foods and drinks with the approximate number of calories in each  Check the food label to find the exact number of calories  A dietitian can tell you how many calories you should have from each food group each day    · Carbohydrate:      ¨ ½ of a 3-inch bagel, 1 slice of bread, or ½ of a hamburger bun or hot dog bun (80)    ¨ 1 (8-inch) flour tortilla or ½ cup of cooked rice (100)    ¨ 1 (6-inch) corn tortilla (80)    ¨ 1 (6-inch) pancake or 1 cup of bran flakes cereal (110)    ¨ ½ cup of cooked cereal (80)    ¨ ½ cup of cooked pasta (85)    ¨ 1 ounce of pretzels (100)    ¨ 3 cups of air-popped popcorn without butter or oil (80)    · Dairy:      ¨ 1 cup of skim or 1% milk (90)    ¨ 1 cup of 2% milk (120)    ¨ 1 cup of whole milk (160)    ¨ 1 cup of 2% chocolate milk (220)    ¨ 1 ounce of low-fat cheese with 3 grams of fat per ounce (70)    ¨ 1 ounce of cheddar cheese (114)    ¨ ½ cup of 1% fat cottage cheese (80)    ¨ 1 cup of plain or sugar-free, fat-free yogurt (90)    · Protein foods:      ¨ 3 ounces of fish (not breaded or fried) (95)    ¨ 3 ounces of breaded, fried fish (195)    ¨ ¾ cup of tuna canned in water (105)    ¨ 3 ounces of chicken breast without skin (105)    ¨ 1 fried chicken breast with skin (350)    ¨ ¼ cup of fat free egg substitute (40)    ¨ 1 large egg (75)    ¨ 3 ounces of lean beef or pork (165)    ¨ 3 ounces of fried pork chop or ham (185)    ¨ ½ cup of cooked dried beans, such as kidney, cervantes, lentils, or navy (115)    ¨ 3 ounces of bologna or lunch meat (225)    ¨ 2 links of breakfast sausage (140)    · Vegetables:      ¨ ½ cup of sliced mushrooms (10)    ¨ 1 cup of salad greens, such as lettuce, spinach, or fish (15)    ¨ ½ cup of steamed asparagus (20)    ¨ ½ cup of cooked summer squash, zucchini squash, or green or wax beans (25)    ¨ 1 cup of broccoli or cauliflower florets, or 1 medium tomato (25)    ¨ 1 large raw carrot or ½ cup of cooked carrots (40)    ¨ ? of a medium cucumber or 1 stalk of celery (5)    ¨ 1 small baked potato (160)    ¨ 1 cup of breaded, fried vegetables (230)    · Fruit:      ¨ 1 (6-inch) banana (55)     ¨ ½ of a 4-inch grapefruit (55)    ¨ 15 grapes (60)    ¨ 1 medium orange or apple (70)    ¨ 1 large peach (65)    ¨ 1 cup of fresh pineapple chunks (75)    ¨ 1 cup of melon cubes (50)    ¨ 1¼ cups of whole strawberries (45)    ¨ ½ cup of fruit canned in juice (55)    ¨ ½ cup of fruit canned in heavy syrup (110)    ¨ ?  cup of raisins (130)    ¨ ½ cup of unsweetened fruit juice (60)    ¨ ½ cup of grape, cranberry, or prune juice (90)    · Fat:      ¨ 10 peanuts or 2 teaspoons of peanut butter (55)    ¨ 2 tablespoons of avocado or 1 tablespoon of regular salad dressing (45)    ¨ 2 slices of steele (90)    ¨ 1 teaspoon of oil, such as safflower, canola, corn, or olive oil (45)    ¨ 2 teaspoons of low-fat margarine, or 1 tablespoon of low-fat mayonnaise (50)    ¨ 1 teaspoon of regular margarine (40)    ¨ 1 tablespoon of regular mayonnaise (135)    ¨ 1 tablespoon of cream cheese or 2 tablespoons of low-fat cream cheese (45)    ¨ 2 tablespoons of vegetable shortening (215)    · Dessert and sweets:      ¨ 8 animal crackers or 5 vanilla wafers (80)    ¨ 1 frozen fruit juice bar (80)    ¨ ½ cup of ice milk or low-fat frozen yogurt (90)    ¨ ½ cup of sherbet or sorbet (125)    ¨ ½ cup of sugar-free pudding or custard (60)    ¨ ½ cup of ice cream (140)    ¨ ½ cup of pudding or custard (175)    ¨ 1 (2-inch) square chocolate brownie (185)    · Combination foods:      ¨ Bean burrito made with an 8-inch tortilla, without cheese (275)    ¨ Chicken breast sandwich with lettuce and tomato (325)    ¨ 1 cup of chicken noodle soup (60)    ¨ 1 beef taco (175)    ¨ Regular hamburger with lettuce and tomato (310)    ¨ Regular cheeseburger with lettuce and tomato (410)     ¨ ¼ of a 12-inch cheese pizza (280)    ¨ Fried fish sandwich with lettuce and tomato (425)    ¨ Hot dog and bun (275)    ¨ 1½ cups of macaroni and cheese (310)    ¨ Taco salad with a fried tortilla shell (870)    · Low-calorie foods:      ¨ 1 tablespoon of ketchup or 1 tablespoon of fat free sour cream (15)    ¨ 1 teaspoon of mustard (5)    ¨ ¼ cup of salsa (20)    ¨ 1 large dill pickle (15)    ¨ 1 tablespoon of fat free salad dressing (10)    ¨ 2 teaspoons of low-sugar, light jam or jelly, or 1 tablespoon of sugar-free syrup (15)    ¨ 1 sugar-free popsicle (15)    ¨ 1 cup of club soda, seltzer water, or diet soda (0)  CARE AGREEMENT:   You have the right to help plan your care  Discuss treatment options with your caregivers to decide what care you want to receive  You always have the right to refuse treatment  The above information is an  only  It is not intended as medical advice for individual conditions or treatments  Talk to your doctor, nurse or pharmacist before following any medical regimen to see if it is safe and effective for you  © 2017 [address] Information is for End User's use only and may not be sold, redistributed or otherwise used for commercial purposes  All illustrations and images included in CareNotes® are the copyrighted property of [employer] [place] [employer]  or Perry Maldonado  CURRENT [employer] TIPS ON EXERCISE:    IF YOU HAVE CONDITIONS THAT PREVENT AEROBIC EXERCISE:    WALK 30 MINUTES AT LEAST 5 DAYS PER WEEK; MAY BREAK IT UP INTO 10-  15 MINUTE SESSIONS   GET SOME RESISTANCE EXERCISES USING THE MAJOR MUSCLE GROUPS 2-3 TIMES PER WEEK     IF YOU ARE PHYSICALLY ABLE:    TRY TO GET 10,000 STEPS 3 TIMES PER WEEK  PLUS  GO "ONLINE" TO CHECK [employer] HEART RATE FOR YOUR AGE AND DO AEROBIC EXERCISES (JOG/STATIONARY BIKE/ELLIPTICAL) FOR 20-30 MINUTES 2-3 TIMES PER WEEK  WE RECOMMEND GETTING THE 2- DOSE SHINGLES VACCINE ([address]) FROM YOUR PHARMACY   CHECK WITH THEM ON THE COST  YOU SHOULD HAVE THIS IF OVER AGE 48, EVEN IF YOU HAVE HAD THE ORIGINAL VACCINE (ZOSTRIX)  TO AVOID COVID (CORONAVIRUS) INFECTION THE FOLLOWING ARE HELPFUL:    1  Avoid areas where there are a lot of people  Examples would be small restaurants churches small grocery stores etc   Keep 6 feet between you  2  If you must go out around people use a mask  When around people use a hand  after touching surfaces  Important areas are grocery stores, gas pumps, pharmacies, esparza, etc  Remember: masks protect the "other krzysztof"  They are not a substitute for staying 6 feet away  3  KEEP YOUR HANDS AWAY FROM YOUR FACE  IT IS EXTREMELY DIFFICULT TO GET COVID INFECTION IF YOU DO NOT TOUCH YOUR FACE AND ARE NOT AROUND PEOPLE  3  If you wish to work in the yard or walk around the neighborhood or in a park and you are not around a lot of people it is okay to keep your mask in your pocket  Casually passing someone without a mask does not put you at risk  4  Try to avoid having people coming in and out of your home  This would include cleaning personnel delivery people unnecessary service people etc   after anyone comes into your home including family be careful to wipe all surfaces with a home

## 2020-09-21 NOTE — PROGRESS NOTES
Assessment/Plan:    No problem-specific Assessment & Plan notes found for this encounter  Diagnoses and all orders for this visit:    Encounter for screening mammogram for malignant neoplasm of breast  -     Mammo screening bilateral w 3d & cad; Future    Immunization due  -     influenza vaccine, quadrivalent, recombinant, PF, 0 5 mL, for patients 18 yr+ (FLUBLOK)    Screening for viral disease  -     Hepatitis C antibody  -     HIV 1/2 Antigen/Antibody (4th Generation) w Reflex [facility]    BMI 29 0-29 9,adult    Gastroesophageal reflux disease without esophagitis    Acquired hypothyroidism    Chronic rhinitis    Elevated blood-pressure reading without diagnosis of hypertension    Other hyperlipidemia          Subjective:      Patient ID: Raffy Reason is a 60 y o  female  PATIENT RETURNS FOR FOLLOW-UP OF CHRONIC MEDICAL CONDITIONS  NO HOSPITAL STAYS OR EMERGENCY VISITS RECENTLY  MEDS WERE REVIEWED AND NO SIDE EFFECTS  NO NEW ISSUES  UNLESS NOTED BELOW  NO NEW MEDICAL PROVIDER REPORTED  THE CHRONIC DISEASES LISTED ABOVE ARE STABLE AND UNCHANGED/ THE PLAN OF CARE FOR THOSE WILL REMAIN UNCHANGED UNLESS NOTED BELOW  Wellness        The following portions of the patient's history were reviewed and updated as appropriate: allergies, current medications, past family history, past medical history, past social history, past surgical history and problem list     Review of Systems   Constitutional: Negative for activity change and appetite change  HENT: Negative for voice change  Eyes: Negative for visual disturbance  Respiratory: Negative for chest tightness and shortness of breath  Cardiovascular: Negative for chest pain, palpitations and leg swelling  Gastrointestinal: Negative for abdominal pain, blood in stool, constipation and diarrhea  Genitourinary: Negative for dysuria, vaginal bleeding and vaginal discharge  Skin: Negative for rash  Neurological: Negative for dizziness  Psychiatric/Behavioral: Negative for dysphoric mood  Objective:  Vitals:    09/21/20 0718   BP: 140/90   BP Location: Left arm   Patient Position: Sitting   Cuff Size: Adult   Pulse: 64   Temp: (!) 96 6 °F (35 9 °C)   TempSrc: Temporal   SpO2: 97%   Weight: 73 1 kg (161 lb 1 6 oz)   Height: 5' 2" (1 575 m)      Physical Exam  Constitutional:       Appearance: She is well-developed  HENT:      Head: Normocephalic and atraumatic  Eyes:      Conjunctiva/sclera: Conjunctivae normal    Neck:      Musculoskeletal: Neck supple  Thyroid: No thyromegaly  Cardiovascular:      Rate and Rhythm: Normal rate and regular rhythm  Heart sounds: Normal heart sounds  No murmur  Pulmonary:      Effort: Pulmonary effort is normal  No respiratory distress  Breath sounds: Normal breath sounds  Lymphadenopathy:      Cervical: No cervical adenopathy  Skin:     General: Skin is warm and dry  Psychiatric:         Behavior: Behavior normal              PATIENT RETURNS FOR FOLLOW-UP OF CHRONIC MEDICAL CONDITIONS  NO HOSPITAL STAYS OR EMERGENCY VISITS RECENTLY  MEDS WERE REVIEWED AND NO SIDE EFFECTS  NO NEW ISSUES  UNLESS NOTED BELOW  NO NEW MEDICAL PROVIDER REPORTED  THE CHRONIC DISEASES LISTED ABOVE ARE STABLE AND UNCHANGED/ THE PLAN OF CARE FOR THOSE WILL REMAIN UNCHANGED UNLESS NOTED BELOW

## 2020-09-21 NOTE — PROGRESS NOTES
BMI Counseling: Body mass index is 29 47 kg/m²  The BMI is above normal  Nutrition recommendations include reducing portion sizes, decreasing overall calorie intake, 3-5 servings of fruits/vegetables daily, reducing fast food intake, consuming healthier snacks, decreasing soda and/or juice intake, moderation in carbohydrate intake, increasing intake of lean protein, reducing intake of saturated fat and trans fat and reducing intake of cholesterol  Exercise recommendations include exercising 3-5 times per week

## 2020-09-22 LAB
CHOLEST SERPL-MCNC: 293 MG/DL (ref 50–200)
EST. AVERAGE GLUCOSE BLD GHB EST-MCNC: 120 MG/DL
HBA1C MFR BLD: 5.8 %
HCV AB SER QL: NORMAL
HDLC SERPL-MCNC: 46 MG/DL
LDLC SERPL CALC-MCNC: 200 MG/DL (ref 0–100)
NONHDLC SERPL-MCNC: 247 MG/DL
TRIGL SERPL-MCNC: 237 MG/DL
TSH SERPL DL<=0.05 MIU/L-ACNC: 3.24 UIU/ML (ref 0.36–3.74)

## 2020-09-23 LAB — HIV 1+2 AB+HIV1 P24 AG SERPL QL IA: NORMAL

## 2020-10-15 ENCOUNTER — OFFICE VISIT (OUTPATIENT)
Dept: FAMILY MEDICINE CLINIC | Facility: CLINIC | Age: 55
End: 2020-10-15
Payer: COMMERCIAL

## 2020-10-15 VITALS
HEIGHT: 62 IN | SYSTOLIC BLOOD PRESSURE: 130 MMHG | HEART RATE: 91 BPM | DIASTOLIC BLOOD PRESSURE: 100 MMHG | BODY MASS INDEX: 29.43 KG/M2 | OXYGEN SATURATION: 97 % | TEMPERATURE: 97.6 F | WEIGHT: 159.9 LBS

## 2020-10-15 DIAGNOSIS — E78.49 OTHER HYPERLIPIDEMIA: Primary | Chronic | ICD-10-CM

## 2020-10-15 PROCEDURE — 1036F TOBACCO NON-USER: CPT | Performed by: FAMILY MEDICINE

## 2020-10-15 PROCEDURE — 99401 PREV MED CNSL INDIV APPRX 15: CPT | Performed by: FAMILY MEDICINE

## 2020-12-15 ENCOUNTER — TREATMENT (OUTPATIENT)
Dept: FAMILY MEDICINE CLINIC | Facility: CLINIC | Age: 55
End: 2020-12-15

## 2020-12-15 ENCOUNTER — TELEPHONE (OUTPATIENT)
Dept: FAMILY MEDICINE CLINIC | Facility: CLINIC | Age: 55
End: 2020-12-15

## 2020-12-15 DIAGNOSIS — U07.1 COVID-19: Primary | Chronic | ICD-10-CM

## 2020-12-15 RX ORDER — DOXYCYCLINE HYCLATE 100 MG/1
CAPSULE ORAL
COMMUNITY
Start: 2020-12-14 | End: 2020-12-21 | Stop reason: ALTCHOICE

## 2020-12-15 RX ORDER — PREDNISONE 20 MG/1
TABLET ORAL
COMMUNITY
Start: 2020-12-14 | End: 2020-12-21

## 2020-12-18 PROBLEM — U07.1 COVID-19 VIRUS INFECTION: Status: ACTIVE | Noted: 2020-12-14

## 2020-12-20 ENCOUNTER — TELEMEDICINE (OUTPATIENT)
Dept: FAMILY MEDICINE CLINIC | Facility: CLINIC | Age: 55
End: 2020-12-20
Payer: COMMERCIAL

## 2020-12-20 DIAGNOSIS — U07.1 COVID-19 VIRUS INFECTION: Primary | ICD-10-CM

## 2020-12-20 PROCEDURE — 99441 PR PHYS/QHP TELEPHONE EVALUATION 5-10 MIN: CPT | Performed by: FAMILY MEDICINE

## 2020-12-21 ENCOUNTER — TELEPHONE (OUTPATIENT)
Dept: FAMILY MEDICINE CLINIC | Facility: CLINIC | Age: 55
End: 2020-12-21

## 2020-12-23 ENCOUNTER — TELEMEDICINE (OUTPATIENT)
Dept: FAMILY MEDICINE CLINIC | Facility: CLINIC | Age: 55
End: 2020-12-23
Payer: COMMERCIAL

## 2020-12-23 DIAGNOSIS — U07.1 COVID-19 VIRUS INFECTION: Primary | ICD-10-CM

## 2020-12-23 PROCEDURE — 99441 PR PHYS/QHP TELEPHONE EVALUATION 5-10 MIN: CPT | Performed by: FAMILY MEDICINE

## 2021-01-11 ENCOUNTER — TELEPHONE (OUTPATIENT)
Dept: FAMILY MEDICINE CLINIC | Facility: CLINIC | Age: 56
End: 2021-01-11

## 2021-01-11 ENCOUNTER — TELEMEDICINE (OUTPATIENT)
Dept: FAMILY MEDICINE CLINIC | Facility: CLINIC | Age: 56
End: 2021-01-11
Payer: COMMERCIAL

## 2021-01-11 DIAGNOSIS — R05.9 COUGH: Primary | ICD-10-CM

## 2021-01-11 PROCEDURE — 99213 OFFICE O/P EST LOW 20 MIN: CPT | Performed by: FAMILY MEDICINE

## 2021-01-11 PROCEDURE — 1036F TOBACCO NON-USER: CPT | Performed by: FAMILY MEDICINE

## 2021-01-11 RX ORDER — GUAIFENESIN AND CODEINE PHOSPHATE 100; 10 MG/5ML; MG/5ML
SOLUTION ORAL
Qty: 120 ML | Refills: 1 | Status: SHIPPED | OUTPATIENT
Start: 2021-01-11 | End: 2021-11-09

## 2021-01-11 NOTE — PROGRESS NOTES
Assessment/Plan:    No problem-specific Assessment & Plan notes found for this encounter  Diagnoses and all orders for this visit:    Cough          Subjective:      Patient ID: Early Place is a 60 y o  female  Cough : several weeks ; seen UC : CXR OK ; + Covid : November/           The following portions of the patient's history were reviewed and updated as appropriate: allergies, current medications, past family history, past medical history, past social history, past surgical history and problem list     Review of Systems   HENT: Positive for congestion and postnasal drip  Respiratory: Positive for cough  Negative for wheezing  Objective: There were no vitals filed for this visit  Physical Exam  Constitutional:       Appearance: She is well-developed  Neck:      Thyroid: No thyromegaly  Pulmonary:      Effort: Pulmonary effort is normal  No respiratory distress  Breath sounds: No wheezing or rales  Lymphadenopathy:      Cervical: No cervical adenopathy  Neurological:      Mental Status: She is alert and oriented to person, place, and time  Psychiatric:         Behavior: Behavior normal          Thought Content:  Thought content normal          Judgment: Judgment normal              ASS: cough ; use loratidine + cough med

## 2021-01-11 NOTE — PATIENT INSTRUCTIONS
For the cough continue using your current cough medicine in the daytime  Add loratadine 10 mg once daily for postnasal drip and take 1 every day for 5-7 days  You may use the codeine cough medicine that I called in nighttime    In the next week or so it should get better

## 2021-01-11 NOTE — TELEPHONE ENCOUNTER
Patient called requesting a n appointment =- RE: cough , going on for about 2-3 weeks, seen in [facility], given medication but it is not helping   Patient is requesting a phone call, please call

## 2021-02-28 DIAGNOSIS — K21.9 GASTROESOPHAGEAL REFLUX DISEASE WITHOUT ESOPHAGITIS: ICD-10-CM

## 2021-02-28 DIAGNOSIS — R51.9 NONINTRACTABLE HEADACHE, UNSPECIFIED CHRONICITY PATTERN, UNSPECIFIED HEADACHE TYPE: ICD-10-CM

## 2021-02-28 RX ORDER — LEVOTHYROXINE SODIUM 0.03 MG/1
TABLET ORAL
Qty: 90 TABLET | Refills: 3 | Status: SHIPPED | OUTPATIENT
Start: 2021-02-28 | End: 2022-02-02

## 2021-02-28 RX ORDER — OMEPRAZOLE 40 MG/1
CAPSULE, DELAYED RELEASE ORAL
Qty: 90 CAPSULE | Refills: 2 | Status: SHIPPED | OUTPATIENT
Start: 2021-02-28 | End: 2021-11-29

## 2021-03-16 ENCOUNTER — TELEMEDICINE (OUTPATIENT)
Dept: FAMILY MEDICINE CLINIC | Facility: CLINIC | Age: 56
End: 2021-03-16
Payer: COMMERCIAL

## 2021-03-16 VITALS — TEMPERATURE: 97.5 F

## 2021-03-16 DIAGNOSIS — R09.81 SINUS CONGESTION: ICD-10-CM

## 2021-03-16 PROCEDURE — 99214 OFFICE O/P EST MOD 30 MIN: CPT | Performed by: NURSE PRACTITIONER

## 2021-03-16 PROCEDURE — 3725F SCREEN DEPRESSION PERFORMED: CPT | Performed by: NURSE PRACTITIONER

## 2021-03-16 PROCEDURE — 1036F TOBACCO NON-USER: CPT | Performed by: NURSE PRACTITIONER

## 2021-03-16 RX ORDER — FLUTICASONE PROPIONATE 50 MCG
2 SPRAY, SUSPENSION (ML) NASAL DAILY
Qty: 16 G | Refills: 1 | Status: SHIPPED | OUTPATIENT
Start: 2021-03-16 | End: 2021-04-07

## 2021-03-16 RX ORDER — CETIRIZINE HYDROCHLORIDE 10 MG/1
10 TABLET ORAL DAILY
Qty: 30 TABLET | Refills: 1 | Status: SHIPPED | OUTPATIENT
Start: 2021-03-16 | End: 2021-04-07

## 2021-03-16 NOTE — PROGRESS NOTES
Virtual Regular Visit      Assessment/Plan:    Problem List Items Addressed This Visit     None      Visit Diagnoses     Sinus congestion    -  Primary    To use saline nasal rinse in a can as often during the day as possible    Relevant Medications    cetirizine (ZyrTEC) 10 mg tablet    fluticasone (FLONASE) 50 mcg/act nasal spray               Reason for visit is No chief complaint on file  Encounter provider SHEEBA Campos    Provider located at [facility] AT [address] [facility]  [address] [place] [id number]      Recent Visits  No visits were found meeting these conditions  Showing recent visits within past 7 days and meeting all other requirements     Today's Visits  Date Type Provider Dept   03/16/21 Telemedicine Alexis Monroe, [address]   Showing today's visits and meeting all other requirements     Future Appointments  No visits were found meeting these conditions  Showing future appointments within next 150 days and meeting all other requirements        The patient was identified by name and date of birth  David Loya was informed that this is a telemedicine visit and that the visit is being conducted through [facility] and patient was informed that this is a secure, HIPAA-compliant platform  She agrees to proceed     My office door was closed  No one else was in the room  She acknowledged consent and understanding of privacy and security of the video platform  The patient has agreed to participate and understands they can discontinue the visit at any time  Patient is aware this is a billable service  Subjective  David Loya is a 60 y o  female with nasal congestion and sinus pressure   Presents today via telemedicine for complaints of nasal congestion and sinus pressure over the last 2 days  States she is very congested and when she lays down she gets very stuffy    She is complaining of also sinus pressure around her eyes  She is using fluticasone 2 sprays every day as well as Allegra D without relief       Past Medical History:   Diagnosis Date    COVID-19 12/11/2020    Disease of thyroid gland     Gastroesophageal reflux disease without esophagitis 9/18/2019    H  pylori infection     Seasonal allergies        Past Surgical History:   Procedure Laterality Date    COLONOSCOPY N/A 2/22/2016    Procedure: COLONOSCOPY;  Surgeon: Otto Shankar MD;  Location: [facility]; Service:        Current Outpatient Medications   Medication Sig Dispense Refill    levothyroxine 25 mcg tablet TAKE 1 TABLET BY MOUTH EVERY DAY 90 tablet 3    omeprazole (PriLOSEC) 40 MG capsule TAKE 1 CAPSULE BY MOUTH EVERY DAY 90 capsule 2    cetirizine (ZyrTEC) 10 mg tablet Take 1 tablet (10 mg total) by mouth daily 30 tablet 1    fluticasone (FLONASE) 50 mcg/act nasal spray 2 sprays into each nostril daily 16 g 1    guaifenesin-codeine (GUAIFENESIN AC) 100-10 MG/5ML liquid 1-2 TSP Q6HR PRN COUGH 120 mL 1     No current facility-administered medications for this visit  Allergies   Allergen Reactions    Escitalopram      Other reaction(s): Palpitations  Other reaction(s): Palpitations       Review of Systems   Constitutional: Negative for chills, fatigue and fever  HENT: Positive for congestion, postnasal drip and sinus pressure  Negative for rhinorrhea, sneezing and sore throat  Respiratory: Negative for cough and shortness of breath  Cardiovascular: Negative for palpitations  Psychiatric/Behavioral: Negative for dysphoric mood  The patient is not nervous/anxious  Video Exam    Vitals:    03/16/21 1503   Temp: 97 5 °F (36 4 °C)   TempSrc: Temporal       Physical Exam  Vitals signs reviewed  Constitutional:       Appearance: Normal appearance  HENT:      Nose:      Right Sinus: No maxillary sinus tenderness or frontal sinus tenderness  Left Sinus: No maxillary sinus tenderness or frontal sinus tenderness  Comments: No sinus tenderness noted with patient palpation  Pulmonary:      Effort: Pulmonary effort is normal  No respiratory distress (no dyspena noted while conversing)  Neurological:      Mental Status: She is alert  I spent 15 minutes with patient today in which greater than 50% of the time was spent in counseling/coordination of care regarding sinus congestion      VIRTUAL VISIT DISCLAIMER    Nikolaskenney Pierre acknowledges that she has consented to an online visit or consultation  She understands that the online visit is based solely on information provided by her, and that, in the absence of a face-to-face physical evaluation by the physician, the diagnosis she receives is both limited and provisional in terms of accuracy and completeness  This is not intended to replace a full medical face-to-face evaluation by the physician  Chelsea Pierre understands and accepts these terms

## 2021-03-17 DIAGNOSIS — Z12.31 ENCOUNTER FOR SCREENING MAMMOGRAM FOR MALIGNANT NEOPLASM OF BREAST: ICD-10-CM

## 2021-03-18 ENCOUNTER — TELEPHONE (OUTPATIENT)
Dept: FAMILY MEDICINE CLINIC | Facility: CLINIC | Age: 56
End: 2021-03-18

## 2021-03-18 DIAGNOSIS — R92.8 ABNORMAL MAMMOGRAM: Primary | ICD-10-CM

## 2021-03-18 NOTE — TELEPHONE ENCOUNTER
[facility] breast health calling requesting an order for a diagnostic mammo be placed for patient due to finding dense tissue in the left breast     Script would be for Left breast diagnostic mammogram with CAD  Ultrasound if indicated  Dx: H29 02         Fax to [phone] Attention Dru Alberts can be reached at [phone] with any questions

## 2021-03-29 DIAGNOSIS — R92.8 ABNORMAL MAMMOGRAM: ICD-10-CM

## 2021-04-07 DIAGNOSIS — R09.81 SINUS CONGESTION: ICD-10-CM

## 2021-04-07 RX ORDER — CETIRIZINE HYDROCHLORIDE 10 MG/1
TABLET ORAL
Qty: 30 TABLET | Refills: 1 | Status: SHIPPED | OUTPATIENT
Start: 2021-04-07 | End: 2021-11-09

## 2021-04-07 RX ORDER — FLUTICASONE PROPIONATE 50 MCG
SPRAY, SUSPENSION (ML) NASAL
Qty: 16 ML | Refills: 1 | Status: SHIPPED | OUTPATIENT
Start: 2021-04-07 | End: 2021-05-13

## 2021-05-13 DIAGNOSIS — R09.81 SINUS CONGESTION: ICD-10-CM

## 2021-05-13 RX ORDER — FLUTICASONE PROPIONATE 50 MCG
SPRAY, SUSPENSION (ML) NASAL
Qty: 16 ML | Refills: 1 | Status: SHIPPED | OUTPATIENT
Start: 2021-05-13 | End: 2021-06-04

## 2021-05-24 ENCOUNTER — TELEPHONE (OUTPATIENT)
Dept: FAMILY MEDICINE CLINIC | Facility: CLINIC | Age: 56
End: 2021-05-24

## 2021-05-24 NOTE — TELEPHONE ENCOUNTER
Woke up dizzy and fatigued   Believes she is due for blood work   Can you order blood work  Then will see her at a later date  She asked to be seen today  but no appt available  [phone]

## 2021-05-24 NOTE — TELEPHONE ENCOUNTER
I called her    Her symptoms are very nonspecific some low energy and fatigue for 24 hours but nothing else I told her to hang in there for a few days and possibly we would need to get her into the office but not just yet

## 2021-06-04 DIAGNOSIS — R09.81 SINUS CONGESTION: ICD-10-CM

## 2021-06-04 RX ORDER — FLUTICASONE PROPIONATE 50 MCG
SPRAY, SUSPENSION (ML) NASAL
Qty: 16 ML | Refills: 1 | Status: SHIPPED | OUTPATIENT
Start: 2021-06-04 | End: 2021-06-21

## 2021-06-18 DIAGNOSIS — R09.81 SINUS CONGESTION: ICD-10-CM

## 2021-06-21 RX ORDER — FLUTICASONE PROPIONATE 50 MCG
SPRAY, SUSPENSION (ML) NASAL
Qty: 48 ML | Refills: 1 | Status: SHIPPED | OUTPATIENT
Start: 2021-06-21 | End: 2021-11-09 | Stop reason: SDUPTHER

## 2021-09-15 ENCOUNTER — APPOINTMENT (EMERGENCY)
Dept: RADIOLOGY | Facility: HOSPITAL | Age: 56
End: 2021-09-15
Payer: COMMERCIAL

## 2021-09-15 ENCOUNTER — HOSPITAL ENCOUNTER (EMERGENCY)
Facility: HOSPITAL | Age: 56
Discharge: HOME | End: 2021-09-15
Attending: EMERGENCY MEDICINE
Payer: COMMERCIAL

## 2021-09-15 VITALS
WEIGHT: 158.95 LBS | DIASTOLIC BLOOD PRESSURE: 92 MMHG | TEMPERATURE: 97.5 F | OXYGEN SATURATION: 97 % | BODY MASS INDEX: 29.07 KG/M2 | HEART RATE: 88 BPM | SYSTOLIC BLOOD PRESSURE: 166 MMHG | RESPIRATION RATE: 18 BRPM

## 2021-09-15 DIAGNOSIS — R03.0 BLOOD PRESSURE ELEVATED WITHOUT HISTORY OF HTN: Primary | ICD-10-CM

## 2021-09-15 DIAGNOSIS — R79.89 ELEVATED TSH: ICD-10-CM

## 2021-09-15 LAB
ALBUMIN SERPL BCP-MCNC: 3.8 G/DL (ref 3.5–5)
ALP SERPL-CCNC: 108 U/L (ref 46–116)
ALT SERPL W P-5'-P-CCNC: 53 U/L (ref 12–78)
ANION GAP SERPL CALCULATED.3IONS-SCNC: 9 MMOL/L (ref 4–13)
AST SERPL W P-5'-P-CCNC: 25 U/L (ref 5–45)
ATRIAL RATE: 104 BPM
BASOPHILS # BLD AUTO: 0.06 THOUSANDS/ΜL (ref 0–0.1)
BASOPHILS NFR BLD AUTO: 1 % (ref 0–1)
BILIRUB SERPL-MCNC: 0.25 MG/DL (ref 0.2–1)
BUN SERPL-MCNC: 20 MG/DL (ref 5–25)
CALCIUM SERPL-MCNC: 8.7 MG/DL (ref 8.3–10.1)
CHLORIDE SERPL-SCNC: 103 MMOL/L (ref 100–108)
CO2 SERPL-SCNC: 28 MMOL/L (ref 21–32)
CREAT SERPL-MCNC: 0.69 MG/DL (ref 0.6–1.3)
D DIMER PPP FEU-MCNC: 0.49 UG/ML FEU
EOSINOPHIL # BLD AUTO: 0.64 THOUSAND/ΜL (ref 0–0.61)
EOSINOPHIL NFR BLD AUTO: 7 % (ref 0–6)
ERYTHROCYTE [DISTWIDTH] IN BLOOD BY AUTOMATED COUNT: 13.8 % (ref 11.6–15.1)
GFR SERPL CREATININE-BSD FRML MDRD: 96 ML/MIN/1.73SQ M
GLUCOSE SERPL-MCNC: 117 MG/DL (ref 65–140)
HCT VFR BLD AUTO: 39.8 % (ref 34.8–46.1)
HGB BLD-MCNC: 13 G/DL (ref 11.5–15.4)
IMM GRANULOCYTES # BLD AUTO: 0.02 THOUSAND/UL (ref 0–0.2)
IMM GRANULOCYTES NFR BLD AUTO: 0 % (ref 0–2)
LYMPHOCYTES # BLD AUTO: 4.03 THOUSANDS/ΜL (ref 0.6–4.47)
LYMPHOCYTES NFR BLD AUTO: 46 % (ref 14–44)
MCH RBC QN AUTO: 29.3 PG (ref 26.8–34.3)
MCHC RBC AUTO-ENTMCNC: 32.7 G/DL (ref 31.4–37.4)
MCV RBC AUTO: 90 FL (ref 82–98)
MONOCYTES # BLD AUTO: 0.58 THOUSAND/ΜL (ref 0.17–1.22)
MONOCYTES NFR BLD AUTO: 7 % (ref 4–12)
NEUTROPHILS # BLD AUTO: 3.36 THOUSANDS/ΜL (ref 1.85–7.62)
NEUTS SEG NFR BLD AUTO: 39 % (ref 43–75)
NRBC BLD AUTO-RTO: 0 /100 WBCS
P AXIS: 51 DEGREES
PLATELET # BLD AUTO: 299 THOUSANDS/UL (ref 149–390)
PMV BLD AUTO: 11.1 FL (ref 8.9–12.7)
POTASSIUM SERPL-SCNC: 3.6 MMOL/L (ref 3.5–5.3)
PR INTERVAL: 164 MS
PROT SERPL-MCNC: 7.6 G/DL (ref 6.4–8.2)
QRS AXIS: 81 DEGREES
QRSD INTERVAL: 104 MS
QT INTERVAL: 370 MS
QTC INTERVAL: 486 MS
RBC # BLD AUTO: 4.44 MILLION/UL (ref 3.81–5.12)
SODIUM SERPL-SCNC: 140 MMOL/L (ref 136–145)
T WAVE AXIS: 24 DEGREES
TROPONIN I SERPL-MCNC: <0.02 NG/ML
TSH SERPL DL<=0.05 MIU/L-ACNC: 5.37 UIU/ML (ref 0.36–3.74)
VENTRICULAR RATE: 104 BPM
WBC # BLD AUTO: 8.69 THOUSAND/UL (ref 4.31–10.16)

## 2021-09-15 PROCEDURE — 71046 X-RAY EXAM CHEST 2 VIEWS: CPT

## 2021-09-15 PROCEDURE — 84439 ASSAY OF FREE THYROXINE: CPT | Performed by: EMERGENCY MEDICINE

## 2021-09-15 PROCEDURE — 93005 ELECTROCARDIOGRAM TRACING: CPT

## 2021-09-15 PROCEDURE — 99285 EMERGENCY DEPT VISIT HI MDM: CPT | Performed by: EMERGENCY MEDICINE

## 2021-09-15 PROCEDURE — 93010 ELECTROCARDIOGRAM REPORT: CPT

## 2021-09-15 PROCEDURE — 84443 ASSAY THYROID STIM HORMONE: CPT | Performed by: EMERGENCY MEDICINE

## 2021-09-15 PROCEDURE — 99285 EMERGENCY DEPT VISIT HI MDM: CPT

## 2021-09-15 PROCEDURE — 84484 ASSAY OF TROPONIN QUANT: CPT | Performed by: EMERGENCY MEDICINE

## 2021-09-15 PROCEDURE — 36415 COLL VENOUS BLD VENIPUNCTURE: CPT | Performed by: EMERGENCY MEDICINE

## 2021-09-15 PROCEDURE — 80053 COMPREHEN METABOLIC PANEL: CPT | Performed by: EMERGENCY MEDICINE

## 2021-09-15 PROCEDURE — 85379 FIBRIN DEGRADATION QUANT: CPT | Performed by: EMERGENCY MEDICINE

## 2021-09-15 PROCEDURE — 85025 COMPLETE CBC W/AUTO DIFF WBC: CPT | Performed by: EMERGENCY MEDICINE

## 2021-09-16 LAB — T4 FREE SERPL-MCNC: 0.97 NG/DL (ref 0.76–1.46)

## 2021-09-16 NOTE — ED PROVIDER NOTES
History  Chief Complaint   Patient presents with    Chest Pain     Reports chest pain that started around 15 minutes  States "it felt like an electric shock " Denies n/v/d  + shortness of breath  Denies cardiac hx       52-year-old female presenting for evaluation of CP/palpitations/SOB  Patient states that she was sitting watching television and is unsure if she fell asleep when she felt a sudden "jolt/electric shock" throughout her whole body and this startled her  She states that she felt her heart was racing and that she was short of breath and called out for her    gave her 2 baby aspirin and symptoms slowly resolved  She is completely asymptomatic at this time  No history of similar in the past   Was otherwise feeling in her normal state of health throughout the day today  Denies any recent illness, fevers, chills, cough/URI symptoms, abdominal pain, nausea vomiting, changes in stool, urinary complaints, calf pain/swelling, numbness, weakness  No known cardiac or lung disease  No history of risk factors for VTE  History of COVID in December and vaccinated a few months ago  MDM:  61 yo F with transient episode of palpitations/SOB, completely resolved at this time, patient is tachycardic on arrival and has elevated BP, will check cardiac workup, D-dimer rule out PE, CXR, TSH, reassess, dispo accordingly          Prior to Admission Medications   Prescriptions Last Dose Informant Patient Reported? Taking?    cetirizine (ZyrTEC) 10 mg tablet Not Taking at Unknown time  No No   Sig: TAKE 1 TABLET BY MOUTH EVERY DAY   Patient not taking: Reported on 9/15/2021   fluticasone (FLONASE) 50 mcg/act nasal spray   No Yes   Sig: SPRAY 2 SPRAYS INTO EACH NOSTRIL EVERY DAY   guaifenesin-codeine (GUAIFENESIN AC) 100-10 MG/5ML liquid  Self No Yes   Si-2 TSP Q6HR PRN COUGH   levothyroxine 25 mcg tablet  Self No Yes   Sig: TAKE 1 TABLET BY MOUTH EVERY DAY   omeprazole (PriLOSEC) 40 MG capsule  Self No Yes   Sig: TAKE 1 CAPSULE BY MOUTH EVERY DAY      Facility-Administered Medications: None       Past Medical History:   Diagnosis Date    COVID-19 12/11/2020    Disease of thyroid gland     Gastroesophageal reflux disease without esophagitis 9/18/2019    H  pylori infection     Seasonal allergies        Past Surgical History:   Procedure Laterality Date    COLONOSCOPY N/A 2/22/2016    Procedure: COLONOSCOPY;  Surgeon: Jolly Das MD;  Location: [facility]; Service:        Family History   Problem Relation Age of Onset    Diabetes Mother     Alzheimer's disease Mother 67    Throat cancer Father 66    Diabetes Brother      I have reviewed and agree with the history as documented  E-Cigarette/Vaping    E-Cigarette Use Never User      E-Cigarette/Vaping Substances    Nicotine No     THC No     CBD No     Flavoring No     Other No     Unknown No      Social History     Tobacco Use    Smoking status: Never Smoker    Smokeless tobacco: Never Used    Tobacco comment: no exposure to passive smoke   Vaping Use    Vaping Use: Never used   Substance Use Topics    Alcohol use: No    Drug use: No       Review of Systems   Constitutional: Negative for chills, fever and unexpected weight change  HENT: Negative for ear pain, rhinorrhea and sore throat  Eyes: Negative for pain and visual disturbance  Respiratory: Positive for shortness of breath  Negative for cough  Cardiovascular: Positive for palpitations  Negative for chest pain and leg swelling  Gastrointestinal: Negative for abdominal pain, constipation, diarrhea, nausea and vomiting  Endocrine: Negative for polydipsia, polyphagia and polyuria  Genitourinary: Negative for dysuria, frequency, hematuria and urgency  Musculoskeletal: Negative for back pain, myalgias and neck pain  Skin: Negative for color change and rash  Allergic/Immunologic: Negative for environmental allergies and immunocompromised state     Neurological: Negative for dizziness, weakness, light-headedness, numbness and headaches  Hematological: Negative for adenopathy  Does not bruise/bleed easily  Psychiatric/Behavioral: Negative for agitation and confusion  All other systems reviewed and are negative  Physical Exam  Physical Exam  Vitals and nursing note reviewed  Constitutional:       General: She is not in acute distress  Appearance: She is well-developed  HENT:      Head: Normocephalic and atraumatic  Nose: Nose normal    Eyes:      Conjunctiva/sclera: Conjunctivae normal    Cardiovascular:      Rate and Rhythm: Regular rhythm  Tachycardia present  Heart sounds: Normal heart sounds  Pulmonary:      Effort: Pulmonary effort is normal  No respiratory distress  Breath sounds: Normal breath sounds  No stridor  No wheezing or rales  Chest:      Chest wall: No tenderness  Abdominal:      General: There is no distension  Palpations: Abdomen is soft  Tenderness: There is no abdominal tenderness  There is no guarding or rebound  Musculoskeletal:         General: No swelling, tenderness or deformity  Cervical back: Normal range of motion and neck supple  Comments: No calf swelling/ttp, no peripheral edema   Skin:     General: Skin is warm and dry  Findings: No rash  Neurological:      General: No focal deficit present  Mental Status: She is alert and oriented to person, place, and time  Motor: No abnormal muscle tone  Coordination: Coordination normal    Psychiatric:         Thought Content:  Thought content normal          Judgment: Judgment normal          Vital Signs  ED Triage Vitals [09/15/21 2119]   Temperature Pulse Respirations Blood Pressure SpO2   97 5 °F (36 4 °C) (!) 112 20 (!) 194/98 98 %      Temp Source Heart Rate Source Patient Position - Orthostatic VS BP Location FiO2 (%)   Oral Monitor Sitting Right arm --      Pain Score       --           Vitals:    09/15/21 2119 09/15/21 6746 09/15/21 2306   BP: (!) 194/98 (!) 185/86 166/92   Pulse: (!) 112 93 92   Patient Position - Orthostatic VS: Sitting Lying          Visual Acuity      ED Medications  Medications - No data to display    Diagnostic Studies  Results Reviewed     Procedure Component Value Units Date/Time    TSH, 3rd generation with Free T4 reflex [[id number]]  (Abnormal) Collected: 09/15/21 2216    Lab Status: Final result Specimen: Blood from Arm, Left Updated: 09/15/21 2248     TSH 3RD GENERATON 5 373 uIU/mL     Narrative:      Patients undergoing fluorescein dye angiography may retain small amounts of fluorescein in the body for 48-72 hours post procedure  Samples containing fluorescein can produce falsely depressed TSH values  If the patient had this procedure,a specimen should be resubmitted post fluorescein clearance  T4, free [[id number]] Collected: 09/15/21 2216    Lab Status:  In process Specimen: Blood from Arm, Left Updated: 09/15/21 2248    Troponin I [[id number]]  (Normal) Collected: 09/15/21 2216    Lab Status: Final result Specimen: Blood from Arm, Left Updated: 09/15/21 2240     Troponin I <0 02 ng/mL     Comprehensive metabolic panel [[id number]] Collected: 09/15/21 2216    Lab Status: Final result Specimen: Blood from Arm, Left Updated: 09/15/21 2238     Sodium 140 mmol/L      Potassium 3 6 mmol/L      Chloride 103 mmol/L      CO2 28 mmol/L      ANION GAP 9 mmol/L      BUN 20 mg/dL      Creatinine 0 69 mg/dL      Glucose 117 mg/dL      Calcium 8 7 mg/dL      AST 25 U/L      ALT 53 U/L      Alkaline Phosphatase 108 U/L      Total Protein 7 6 g/dL      Albumin 3 8 g/dL      Total Bilirubin 0 25 mg/dL      eGFR 96 ml/min/1 73sq m     Narrative:      [facility] guidelines for Chronic Kidney Disease (CKD):     Stage 1 with normal or high GFR (GFR > 90 mL/min/1 73 square meters)    Stage 2 Mild CKD (GFR = 60-89 mL/min/1 73 square meters)    Stage 3A Moderate CKD (GFR = 45-59 mL/min/1 73 square meters)    Stage 3B Moderate CKD (GFR = 30-44 mL/min/1 73 square meters)    Stage 4 Severe CKD (GFR = 15-29 mL/min/1 73 square meters)    Stage 5 End Stage CKD (GFR <15 mL/min/1 73 square meters)  Note: GFR calculation is accurate only with a steady state creatinine    D-dimer, quantitative [[id number]]  (Normal) Collected: 09/15/21 2216    Lab Status: Final result Specimen: Blood from Arm, Left Updated: 09/15/21 2234     D-Dimer, Quant 0 49 ug/ml FEU     CBC and differential [[id number]]  (Abnormal) Collected: 09/15/21 2216    Lab Status: Final result Specimen: Blood from Arm, Left Updated: 09/15/21 2223     WBC 8 69 Thousand/uL      RBC 4 44 Million/uL      Hemoglobin 13 0 g/dL      Hematocrit 39 8 %      MCV 90 fL      MCH 29 3 pg      MCHC 32 7 g/dL      RDW 13 8 %      MPV 11 1 fL      Platelets 443 Thousands/uL      nRBC 0 /100 WBCs      Neutrophils Relative 39 %      Immat GRANS % 0 %      Lymphocytes Relative 46 %      Monocytes Relative 7 %      Eosinophils Relative 7 %      Basophils Relative 1 %      Neutrophils Absolute 3 36 Thousands/µL      Immature Grans Absolute 0 02 Thousand/uL      Lymphocytes Absolute 4 03 Thousands/µL      Monocytes Absolute 0 58 Thousand/µL      Eosinophils Absolute 0 64 Thousand/µL      Basophils Absolute 0 06 Thousands/µL                  XR chest 2 views    (Results Pending)        CXR interpreted by myself: no acute abn      Procedures  Procedures         ED Course  ED Course as of Sep 15 2321   Wed Sep 15, 2021   2219 EKG: sinus tachycardia @ 108 bpm, normal axis, qtc 486, Q wave lead III, t wave inversion lead III, v1      2221 Pulse(!): 112   2221 Blood Pressure(!): 194/98   2241 D-Dimer, Quant: 0 49   2308 Discussed all results with patient and  at bedside  No known history of hypertension and blood pressure still elevated, although has improved without intervention while in the ED  She is still completely asymptomatic  Heart rate has improved    Reviewed results including elevated TSH  Offered to have patient stay for delta troponin, patient would prefer to go home given she is feeling well at this time  Instructed to call to follow up with her family doctor  Return precautions reviewed and patient expressed understanding with plan                HEART Risk Score      Most Recent Value   Heart Score Risk Calculator   History  0 Filed at: 09/15/2021 2221   ECG  1 Filed at: 09/15/2021 2221   Age  1 Filed at: 09/15/2021 2221   Risk Factors  0 Filed at: 09/15/2021 2221   Troponin  0 Filed at: 09/15/2021 2221   HEART Score  2 Filed at: 09/15/2021 2221                          Noam Delgado' Criteria for PE      Most Recent Value   Wells' Criteria for PE   Clinical signs and symptoms of DVT  0 Filed at: 09/15/2021 2221   PE is primary diagnosis or equally likely  0 Filed at: 09/15/2021 2221   HR >100  1 5 Filed at: 09/15/2021 2221   Immobilization at least 3 days or Surgery in the previous 4 weeks  0 Filed at: 09/15/2021 2221   Previous, objectively diagnosed PE or DVT  0 Filed at: 09/15/2021 2221   Hemoptysis  0 Filed at: 09/15/2021 2221   Malignancy with treatment within 6 months or palliative  0 Filed at: 09/15/2021 2221   Wells' Criteria Total  1 5 Filed at: 09/15/2021 2221                MDM  Number of Diagnoses or Management Options  Blood pressure elevated without history of HTN  Elevated TSH  Diagnosis management comments: 61 yo F presenting after possibly falling asleep and having startle response with palpitations/SOB, resolved completely and did not recur in ED  ED unremarkable only significant for elevated BP/HR that improved through ED course  TSH elevated, T4 pending   History of hypothyroidism on Synthroid, discussed to f/u with PCP  Offered to observe pt and do delta troponin  Instructed to call for close PCP f/u and return precautions reviewed with pt/ at bedside who expressed understanding with plan       Amount and/or Complexity of Data Reviewed  Clinical lab tests: ordered and reviewed  Tests in the radiology section of CPT®: ordered and reviewed  Tests in the medicine section of CPT®: ordered and reviewed  Review and summarize past medical records: yes  Independent visualization of images, tracings, or specimens: yes        Disposition  Final diagnoses:   Blood pressure elevated without history of HTN   Elevated TSH     Time reflects when diagnosis was documented in both MDM as applicable and the Disposition within this note     Time User Action Codes Description Comment    9/15/2021 10:58 PM Venetta Nazario A Add [R03 0] Elevated blood pressure reading     9/15/2021 10:58 PM Venetta Nazario A Remove [R03 0] Elevated blood pressure reading     9/15/2021 10:58 PM Venetta Nazario A Add [R03 0] Blood pressure elevated without history of HTN     9/15/2021 10:58 PM Deven Daily Add [R79 89] Elevated TSH     9/15/2021 11:00 PM Venetta Nazario A Add [R20 8] Electrical shock sensation     9/15/2021 11:06 PM Venetta Nazario A Remove [R20 8] Electrical shock sensation       ED Disposition     ED Disposition Condition Date/Time Comment    Discharge Stable Wed Sep 15, 2021 11:06 PM Jacob Omer discharge to home/self care  Follow-up Information     Follow up With Specialties Details [facility] [place], [address]  [phone]            Patient's Medications   Discharge Prescriptions    No medications on file     No discharge procedures on file      PDMP Review     None          ED Provider  Electronically Signed by           Mo Conti DO  09/15/21 1555

## 2021-09-16 NOTE — ED NOTES
Spouse reports giving patient 2 baby aspirin prior to coming in        Virgie Titus RN  09/15/21 6145

## 2021-09-16 NOTE — ED NOTES
2-81 ASA PTA      Manju Hernandez, RN  09/15/21 5523 Patient calling back as he was told by the  Central Scheduling department that there was no virtual visits available on the weekends. FNA warm transferred caller to  Central Scheduling to see if he would be interested in virtual visit. Patient declined and RN advised to be seen in the walk-in clinic at [place] or [place] tomorrow (8am-4pm). Patient verbalized understanding of directives and had no further questions.     Zonia Savage RN - BSN  [facility]

## 2021-09-16 NOTE — DISCHARGE INSTRUCTIONS
Please follow-up with your family doctor regarding tonight's symptoms, elevated blood pressure reading and elevated TSH    Return to the ER if you experience any new or worsening symptoms including but not limited to chest pain, difficulty breathing, lightheadedness, passing out, etc

## 2021-09-21 ENCOUNTER — OFFICE VISIT (OUTPATIENT)
Dept: FAMILY MEDICINE CLINIC | Facility: CLINIC | Age: 56
End: 2021-09-21
Payer: COMMERCIAL

## 2021-09-21 VITALS
TEMPERATURE: 98 F | HEART RATE: 93 BPM | SYSTOLIC BLOOD PRESSURE: 122 MMHG | DIASTOLIC BLOOD PRESSURE: 84 MMHG | WEIGHT: 154.2 LBS | HEIGHT: 62 IN | OXYGEN SATURATION: 98 % | BODY MASS INDEX: 28.37 KG/M2

## 2021-09-21 DIAGNOSIS — R03.0 ELEVATED BLOOD-PRESSURE READING WITHOUT DIAGNOSIS OF HYPERTENSION: ICD-10-CM

## 2021-09-21 DIAGNOSIS — U07.1 COVID-19 VIRUS INFECTION: Primary | ICD-10-CM

## 2021-09-21 PROCEDURE — 3725F SCREEN DEPRESSION PERFORMED: CPT | Performed by: FAMILY MEDICINE

## 2021-09-21 PROCEDURE — 99213 OFFICE O/P EST LOW 20 MIN: CPT | Performed by: FAMILY MEDICINE

## 2021-09-21 PROCEDURE — 1036F TOBACCO NON-USER: CPT | Performed by: FAMILY MEDICINE

## 2021-09-21 PROCEDURE — 3008F BODY MASS INDEX DOCD: CPT | Performed by: FAMILY MEDICINE

## 2021-09-21 NOTE — PATIENT INSTRUCTIONS
Avoid heavy caffeine load  That would include coffee tea ice tea sodas with caffeine like robert and also high doses of chocolate  Also do not take any decongestant medicines or any medicine for sinus  These medicines often have Sudafed in it and that can make the heart race or make you sleepless    You may add Claritin 10 mg (generic okay) once daily for the allergy symptoms  Continue to use the saline spray and the Flonase

## 2021-09-21 NOTE — PROGRESS NOTES
Assessment/Plan:    COVID-19 virus infection  Mild anosmia persisting        Diagnoses and all orders for this visit:    COVID-19 virus infection    Elevated blood-pressure reading without diagnosis of hypertension          Subjective:      Patient ID: Samantha Zelaya is a 60 y o  female  Patient is here to follow-up on the emergency room visit  She was awakened suddenly with the startling sensation and rapid heart rate  She felt weak  She was seen in the emergency room all the testing was normal   EKG plus blood work etcetera  Her heart exam at that time was fine  Her blood pressure little bit high when she went to the emergency room but it is normal today  She is on no new medications there did not appear to be any caffeine load or stimulant load does not smoke and does not have any alcohol in her system that day  Since being in the emergency room she feels perfectly normal      The following portions of the patient's history were reviewed and updated as appropriate: allergies, current medications, past family history, past medical history, past social history, past surgical history and problem list     Review of Systems   Constitutional: Negative for activity change and appetite change  HENT: Negative for voice change  Eyes: Negative for visual disturbance  Respiratory: Negative for chest tightness and shortness of breath  Cardiovascular: Negative for chest pain, palpitations and leg swelling  Gastrointestinal: Negative for abdominal pain, blood in stool, constipation and diarrhea  Genitourinary: Negative for dysuria, vaginal bleeding and vaginal discharge  Skin: Negative for rash  Neurological: Negative for dizziness  Psychiatric/Behavioral: Negative for dysphoric mood           Objective:  Vitals:    09/21/21 1051   BP: 122/84   BP Location: Left arm   Patient Position: Sitting   Cuff Size: Standard   Pulse: 93   Temp: 98 °F (36 7 °C)   TempSrc: Temporal   SpO2: 98%   Weight: 69 9 kg (154 lb 3 2 oz)   Height: 5' 2" (1 575 m)      Physical Exam  Constitutional:       Appearance: She is well-developed  HENT:      Head: Normocephalic and atraumatic  Eyes:      Conjunctiva/sclera: Conjunctivae normal    Neck:      Thyroid: No thyromegaly  Cardiovascular:      Rate and Rhythm: Normal rate and regular rhythm  Heart sounds: Normal heart sounds  No murmur heard  Pulmonary:      Effort: Pulmonary effort is normal  No respiratory distress  Breath sounds: Normal breath sounds  Musculoskeletal:      Cervical back: Neck supple  Lymphadenopathy:      Cervical: No cervical adenopathy  Skin:     General: Skin is warm and dry  Psychiatric:         Behavior: Behavior normal            At this time her exam and lab workup is all normal   Not clear whether she may have had a brief bout of tachycardia but no history of this for any prolonged basis    She will observe for symptoms in avoid stimulants

## 2021-10-05 ENCOUNTER — TELEPHONE (OUTPATIENT)
Dept: FAMILY MEDICINE CLINIC | Facility: CLINIC | Age: 56
End: 2021-10-05

## 2021-10-05 ENCOUNTER — TREATMENT (OUTPATIENT)
Dept: FAMILY MEDICINE CLINIC | Facility: CLINIC | Age: 56
End: 2021-10-05

## 2021-10-05 DIAGNOSIS — R29.91 ABNORMAL FINDING OF FOOT: Primary | ICD-10-CM

## 2021-10-06 ENCOUNTER — TREATMENT (OUTPATIENT)
Dept: FAMILY MEDICINE CLINIC | Facility: CLINIC | Age: 56
End: 2021-10-06

## 2021-10-06 DIAGNOSIS — R29.91 ABNORMAL FINDING OF FOOT: Primary | ICD-10-CM

## 2021-10-29 ENCOUNTER — TELEPHONE (OUTPATIENT)
Dept: FAMILY MEDICINE CLINIC | Facility: CLINIC | Age: 56
End: 2021-10-29

## 2021-11-01 ENCOUNTER — HOSPITAL ENCOUNTER (EMERGENCY)
Facility: HOSPITAL | Age: 56
Discharge: HOME | End: 2021-11-01
Attending: EMERGENCY MEDICINE | Admitting: EMERGENCY MEDICINE
Payer: COMMERCIAL

## 2021-11-01 VITALS
SYSTOLIC BLOOD PRESSURE: 143 MMHG | OXYGEN SATURATION: 96 % | HEART RATE: 76 BPM | TEMPERATURE: 98.2 F | DIASTOLIC BLOOD PRESSURE: 87 MMHG | RESPIRATION RATE: 18 BRPM

## 2021-11-01 DIAGNOSIS — K29.60 REFLUX GASTRITIS: ICD-10-CM

## 2021-11-01 DIAGNOSIS — R10.13 EPIGASTRIC PAIN: ICD-10-CM

## 2021-11-01 DIAGNOSIS — I10 HIGH BLOOD PRESSURE: Primary | ICD-10-CM

## 2021-11-01 LAB
ALBUMIN SERPL BCP-MCNC: 3.8 G/DL (ref 3.5–5)
ALP SERPL-CCNC: 105 U/L (ref 46–116)
ALT SERPL W P-5'-P-CCNC: 41 U/L (ref 12–78)
ANION GAP SERPL CALCULATED.3IONS-SCNC: 9 MMOL/L (ref 4–13)
AST SERPL W P-5'-P-CCNC: 19 U/L (ref 5–45)
ATRIAL RATE: 87 BPM
BASOPHILS # BLD AUTO: 0.05 THOUSANDS/ΜL (ref 0–0.1)
BASOPHILS NFR BLD AUTO: 1 % (ref 0–1)
BILIRUB SERPL-MCNC: 0.39 MG/DL (ref 0.2–1)
BUN SERPL-MCNC: 15 MG/DL (ref 5–25)
CALCIUM SERPL-MCNC: 9 MG/DL (ref 8.3–10.1)
CHLORIDE SERPL-SCNC: 104 MMOL/L (ref 100–108)
CO2 SERPL-SCNC: 26 MMOL/L (ref 21–32)
CREAT SERPL-MCNC: 0.6 MG/DL (ref 0.6–1.3)
EOSINOPHIL # BLD AUTO: 0.36 THOUSAND/ΜL (ref 0–0.61)
EOSINOPHIL NFR BLD AUTO: 6 % (ref 0–6)
ERYTHROCYTE [DISTWIDTH] IN BLOOD BY AUTOMATED COUNT: 13.3 % (ref 11.6–15.1)
GFR SERPL CREATININE-BSD FRML MDRD: 101 ML/MIN/1.73SQ M
GLUCOSE SERPL-MCNC: 109 MG/DL (ref 65–140)
HCT VFR BLD AUTO: 38.7 % (ref 34.8–46.1)
HGB BLD-MCNC: 13 G/DL (ref 11.5–15.4)
IMM GRANULOCYTES # BLD AUTO: 0.02 THOUSAND/UL (ref 0–0.2)
IMM GRANULOCYTES NFR BLD AUTO: 0 % (ref 0–2)
LIPASE SERPL-CCNC: 144 U/L (ref 73–393)
LYMPHOCYTES # BLD AUTO: 1.93 THOUSANDS/ΜL (ref 0.6–4.47)
LYMPHOCYTES NFR BLD AUTO: 33 % (ref 14–44)
MCH RBC QN AUTO: 29.7 PG (ref 26.8–34.3)
MCHC RBC AUTO-ENTMCNC: 33.6 G/DL (ref 31.4–37.4)
MCV RBC AUTO: 88 FL (ref 82–98)
MONOCYTES # BLD AUTO: 0.3 THOUSAND/ΜL (ref 0.17–1.22)
MONOCYTES NFR BLD AUTO: 5 % (ref 4–12)
NEUTROPHILS # BLD AUTO: 3.28 THOUSANDS/ΜL (ref 1.85–7.62)
NEUTS SEG NFR BLD AUTO: 55 % (ref 43–75)
NRBC BLD AUTO-RTO: 0 /100 WBCS
P AXIS: 52 DEGREES
PLATELET # BLD AUTO: 256 THOUSANDS/UL (ref 149–390)
PMV BLD AUTO: 10.8 FL (ref 8.9–12.7)
POTASSIUM SERPL-SCNC: 3.7 MMOL/L (ref 3.5–5.3)
PR INTERVAL: 180 MS
PROT SERPL-MCNC: 7.6 G/DL (ref 6.4–8.2)
QRS AXIS: -36 DEGREES
QRSD INTERVAL: 94 MS
QT INTERVAL: 358 MS
QTC INTERVAL: 430 MS
RBC # BLD AUTO: 4.38 MILLION/UL (ref 3.81–5.12)
SODIUM SERPL-SCNC: 139 MMOL/L (ref 136–145)
T WAVE AXIS: 19 DEGREES
TROPONIN I SERPL-MCNC: <0.02 NG/ML
VENTRICULAR RATE: 87 BPM
WBC # BLD AUTO: 5.94 THOUSAND/UL (ref 4.31–10.16)

## 2021-11-01 PROCEDURE — 99284 EMERGENCY DEPT VISIT MOD MDM: CPT

## 2021-11-01 PROCEDURE — 85025 COMPLETE CBC W/AUTO DIFF WBC: CPT | Performed by: EMERGENCY MEDICINE

## 2021-11-01 PROCEDURE — 93005 ELECTROCARDIOGRAM TRACING: CPT

## 2021-11-01 PROCEDURE — 84484 ASSAY OF TROPONIN QUANT: CPT | Performed by: EMERGENCY MEDICINE

## 2021-11-01 PROCEDURE — 80053 COMPREHEN METABOLIC PANEL: CPT | Performed by: EMERGENCY MEDICINE

## 2021-11-01 PROCEDURE — 36415 COLL VENOUS BLD VENIPUNCTURE: CPT | Performed by: EMERGENCY MEDICINE

## 2021-11-01 PROCEDURE — 83690 ASSAY OF LIPASE: CPT | Performed by: EMERGENCY MEDICINE

## 2021-11-01 PROCEDURE — 99285 EMERGENCY DEPT VISIT HI MDM: CPT | Performed by: EMERGENCY MEDICINE

## 2021-11-01 PROCEDURE — 93010 ELECTROCARDIOGRAM REPORT: CPT | Performed by: INTERNAL MEDICINE

## 2021-11-01 RX ORDER — MAGNESIUM HYDROXIDE/ALUMINUM HYDROXICE/SIMETHICONE 120; 1200; 1200 MG/30ML; MG/30ML; MG/30ML
30 SUSPENSION ORAL ONCE
Status: COMPLETED | OUTPATIENT
Start: 2021-11-01 | End: 2021-11-01

## 2021-11-01 RX ORDER — LIDOCAINE HYDROCHLORIDE 20 MG/ML
15 SOLUTION OROPHARYNGEAL ONCE
Status: COMPLETED | OUTPATIENT
Start: 2021-11-01 | End: 2021-11-01

## 2021-11-01 RX ADMIN — ALUMINA, MAGNESIA, AND SIMETHICONE ORAL SUSPENSION REGULAR STRENGTH 30 ML: 1200; 1200; 120 SUSPENSION ORAL at 09:28

## 2021-11-01 RX ADMIN — LIDOCAINE HYDROCHLORIDE 15 ML: 20 SOLUTION ORAL; TOPICAL at 09:28

## 2021-11-09 ENCOUNTER — OFFICE VISIT (OUTPATIENT)
Dept: FAMILY MEDICINE CLINIC | Facility: CLINIC | Age: 56
End: 2021-11-09
Payer: COMMERCIAL

## 2021-11-09 VITALS
DIASTOLIC BLOOD PRESSURE: 92 MMHG | BODY MASS INDEX: 28.53 KG/M2 | RESPIRATION RATE: 18 BRPM | OXYGEN SATURATION: 98 % | SYSTOLIC BLOOD PRESSURE: 148 MMHG | HEART RATE: 89 BPM | WEIGHT: 156 LBS | TEMPERATURE: 97.7 F

## 2021-11-09 DIAGNOSIS — U07.1 COVID-19 VIRUS INFECTION: ICD-10-CM

## 2021-11-09 DIAGNOSIS — R03.0 ELEVATED BLOOD-PRESSURE READING WITHOUT DIAGNOSIS OF HYPERTENSION: ICD-10-CM

## 2021-11-09 DIAGNOSIS — Z23 NEED FOR IMMUNIZATION AGAINST INFLUENZA: ICD-10-CM

## 2021-11-09 DIAGNOSIS — E03.9 ACQUIRED HYPOTHYROIDISM: Primary | ICD-10-CM

## 2021-11-09 DIAGNOSIS — K21.9 GASTROESOPHAGEAL REFLUX DISEASE WITHOUT ESOPHAGITIS: Chronic | ICD-10-CM

## 2021-11-09 DIAGNOSIS — J30.1 ALLERGIC RHINITIS DUE TO POLLEN, UNSPECIFIED SEASONALITY: ICD-10-CM

## 2021-11-09 DIAGNOSIS — R09.81 SINUS CONGESTION: ICD-10-CM

## 2021-11-09 DIAGNOSIS — E78.49 OTHER HYPERLIPIDEMIA: Chronic | ICD-10-CM

## 2021-11-09 PROBLEM — R05.9 COUGH: Status: RESOLVED | Noted: 2018-12-26 | Resolved: 2021-11-09

## 2021-11-09 PROCEDURE — 3725F SCREEN DEPRESSION PERFORMED: CPT | Performed by: FAMILY MEDICINE

## 2021-11-09 PROCEDURE — 90682 RIV4 VACC RECOMBINANT DNA IM: CPT

## 2021-11-09 PROCEDURE — 90471 IMMUNIZATION ADMIN: CPT

## 2021-11-09 PROCEDURE — 99396 PREV VISIT EST AGE 40-64: CPT | Performed by: FAMILY MEDICINE

## 2021-11-09 PROCEDURE — 1036F TOBACCO NON-USER: CPT | Performed by: FAMILY MEDICINE

## 2021-11-09 RX ORDER — FEXOFENADINE HCL 180 MG/1
180 TABLET ORAL DAILY
COMMUNITY

## 2021-11-09 RX ORDER — FLUTICASONE PROPIONATE 50 MCG
2 SPRAY, SUSPENSION (ML) NASAL DAILY
Qty: 48 ML | Refills: 12 | Status: SHIPPED | OUTPATIENT
Start: 2021-11-09

## 2021-11-29 DIAGNOSIS — K21.9 GASTROESOPHAGEAL REFLUX DISEASE WITHOUT ESOPHAGITIS: ICD-10-CM

## 2021-11-29 RX ORDER — OMEPRAZOLE 40 MG/1
CAPSULE, DELAYED RELEASE ORAL
Qty: 90 CAPSULE | Refills: 2 | Status: SHIPPED | OUTPATIENT
Start: 2021-11-29

## 2022-01-04 ENCOUNTER — TELEMEDICINE (OUTPATIENT)
Dept: FAMILY MEDICINE CLINIC | Facility: CLINIC | Age: 57
End: 2022-01-04
Payer: COMMERCIAL

## 2022-01-04 DIAGNOSIS — B34.9 VIRAL INFECTION, UNSPECIFIED: Primary | ICD-10-CM

## 2022-01-04 PROCEDURE — 99213 OFFICE O/P EST LOW 20 MIN: CPT | Performed by: FAMILY MEDICINE

## 2022-01-04 PROCEDURE — 1036F TOBACCO NON-USER: CPT | Performed by: FAMILY MEDICINE

## 2022-01-04 PROCEDURE — U0003 INFECTIOUS AGENT DETECTION BY NUCLEIC ACID (DNA OR RNA); SEVERE ACUTE RESPIRATORY SYNDROME CORONAVIRUS 2 (SARS-COV-2) (CORONAVIRUS DISEASE [COVID-19]), AMPLIFIED PROBE TECHNIQUE, MAKING USE OF HIGH THROUGHPUT TECHNOLOGIES AS DESCRIBED BY CMS-2020-01-R: HCPCS | Performed by: FAMILY MEDICINE

## 2022-01-04 PROCEDURE — U0005 INFEC AGEN DETEC AMPLI PROBE: HCPCS | Performed by: FAMILY MEDICINE

## 2022-01-04 RX ORDER — BENZONATATE 200 MG/1
200 CAPSULE ORAL 3 TIMES DAILY PRN
Qty: 20 CAPSULE | Refills: 0 | Status: SHIPPED | OUTPATIENT
Start: 2022-01-04 | End: 2022-05-09 | Stop reason: ALTCHOICE

## 2022-01-04 NOTE — PROGRESS NOTES
Virtual Regular Visit    Verification of patient location:    Patient is located in the following state in which I hold an active license [place]      Assessment/Plan:    Problem List Items Addressed This Visit        Other    Viral infection, unspecified - Primary     New  · Symptom onset 01/02/2022  · Multiple sick contacts at home  · Patient is afebrile  · Symptoms include cough, congestion, body aches, and headaches  · Patient had COVID 12/2020  · Vaccinated without booster  · Rule out breakthrough COVID infection  · Start Tessalon for cough  · Continue Tylenol, hydration, and rest as needed  · Follow-up as needed         Relevant Medications    benzonatate (TESSALON) 200 MG capsule    Other Relevant Orders    COVID Only- Collected at [facility]               Reason for visit is   Chief Complaint   Patient presents with    Cough     symptoms started 2 days ago    Sore Throat    Headache     bodyaches     Virtual Regular Visit        Encounter provider [address] [facility] [address] Lea [address], DO    Provider located at [facility] AT [address] [address]      Recent Visits  No visits were found meeting these conditions  Showing recent visits within past 7 days and meeting all other requirements  Today's Visits  Date Type Provider Dept   01/04/22 Telemedicine Krystian Jordan DO [address]    Showing today's visits and meeting all other requirements  Future Appointments  No visits were found meeting these conditions  Showing future appointments within next 150 days and meeting all other requirements       The patient was identified by name and date of birth  Mary Hdez was informed that this is a telemedicine visit and that the visit is being conducted through [address] [place] [address] Now and patient was informed that this is a secure, HIPAA-compliant platform  She agrees to proceed     My office door was closed   No one else was in the room  She acknowledged consent and understanding of privacy and security of the video platform  The patient has agreed to participate and understands they can discontinue the visit at any time  Patient is aware this is a billable service  Subjective  Agustina Nelson is a 60 y o  female presents for sick visit  Symptoms: headaches, body aches, cough, sore throat, congestion  Symptom onset: 1/2/2022  Medications tried: none  COVID vaccine:   Sick contacts: multiple sick contacts    HPI     Past Medical History:   Diagnosis Date    COVID-19 12/11/2020    Disease of thyroid gland     Gastroesophageal reflux disease without esophagitis 9/18/2019    H  pylori infection     Seasonal allergies        Past Surgical History:   Procedure Laterality Date    COLONOSCOPY N/A 2/22/2016    Procedure: COLONOSCOPY;  Surgeon: Adela Freire MD;  Location: [facility]; Service:        Current Outpatient Medications   Medication Sig Dispense Refill    fexofenadine (ALLEGRA) 180 MG tablet Take 180 mg by mouth daily      fluticasone (FLONASE) 50 mcg/act nasal spray 2 sprays into each nostril daily 48 mL 12    levothyroxine 25 mcg tablet TAKE 1 TABLET BY MOUTH EVERY DAY 90 tablet 3    omeprazole (PriLOSEC) 40 MG capsule TAKE 1 CAPSULE BY MOUTH EVERY DAY 90 capsule 2    benzonatate (TESSALON) 200 MG capsule Take 1 capsule (200 mg total) by mouth 3 (three) times a day as needed for cough 20 capsule 0     No current facility-administered medications for this visit  Allergies   Allergen Reactions    Escitalopram      Other reaction(s): Palpitations  Other reaction(s): Palpitations       Review of Systems   Constitutional: Negative for activity change, chills, diaphoresis and fever  HENT: Positive for congestion, postnasal drip and sore throat  Negative for ear pain, hearing loss, rhinorrhea, sinus pressure, sinus pain and sneezing  Respiratory: Positive for cough   Negative for chest tightness, shortness of breath and wheezing  Cardiovascular: Negative for chest pain, palpitations and leg swelling  Gastrointestinal: Negative for abdominal pain, blood in stool, constipation, diarrhea, nausea and vomiting  Genitourinary: Negative for dysuria, frequency, hematuria and urgency  Musculoskeletal: Positive for myalgias  Negative for arthralgias  Neurological: Positive for headaches  Negative for dizziness, syncope, weakness, light-headedness and numbness  Video Exam    There were no vitals filed for this visit  Physical Exam  Vitals reviewed  Constitutional:       General: She is not in acute distress  Appearance: She is not ill-appearing or toxic-appearing  HENT:      Head: Normocephalic and atraumatic  Right Ear: External ear normal       Left Ear: External ear normal       Nose: Congestion present  Mouth/Throat:      Mouth: Mucous membranes are moist       Pharynx: Oropharynx is clear  Eyes:      General: No scleral icterus  Right eye: No discharge  Left eye: No discharge  Conjunctiva/sclera: Conjunctivae normal    Pulmonary:      Effort: No respiratory distress  Skin:     Coloration: Skin is not pale  Findings: No erythema  Neurological:      Mental Status: She is alert  Mental status is at baseline  Psychiatric:         Mood and Affect: Mood normal          Thought Content: Thought content normal           I spent 15 minutes directly with the patient during this visit    VIRTUAL VISIT DISCLAIMER      Clinton Barba verbally agrees to participate in [facility]  Pt is aware that [facility] could be limited without vital signs or the ability to perform a full hands-on physical exam  Marie Lloyd understands she or the provider may request at any time to terminate the video visit and request the patient to seek care or treatment in person

## 2022-01-04 NOTE — ASSESSMENT & PLAN NOTE
New  · Symptom onset 01/02/2022  · Multiple sick contacts at home  · Patient is afebrile  · Symptoms include cough, congestion, body aches, and headaches  · Patient had [place] 12/2020  · Vaccinated without booster  · Rule out breakthrough COVID infection  · Start Tessalon for cough  · Continue Tylenol, hydration, and rest as needed  · Follow-up as needed

## 2022-02-02 DIAGNOSIS — R51.9 NONINTRACTABLE HEADACHE, UNSPECIFIED CHRONICITY PATTERN, UNSPECIFIED HEADACHE TYPE: ICD-10-CM

## 2022-02-02 RX ORDER — LEVOTHYROXINE SODIUM 0.03 MG/1
TABLET ORAL
Qty: 90 TABLET | Refills: 3 | Status: SHIPPED | OUTPATIENT
Start: 2022-02-02

## 2022-02-23 ENCOUNTER — TELEPHONE (OUTPATIENT)
Dept: FAMILY MEDICINE CLINIC | Facility: CLINIC | Age: 57
End: 2022-02-23

## 2022-02-23 ENCOUNTER — TELEMEDICINE (OUTPATIENT)
Dept: FAMILY MEDICINE CLINIC | Facility: CLINIC | Age: 57
End: 2022-02-23
Payer: COMMERCIAL

## 2022-02-23 VITALS — TEMPERATURE: 96 F

## 2022-02-23 DIAGNOSIS — R22.0 RIGHT FACIAL SWELLING: Primary | ICD-10-CM

## 2022-02-23 DIAGNOSIS — R22.1 NECK SWELLING: ICD-10-CM

## 2022-02-23 PROCEDURE — 99213 OFFICE O/P EST LOW 20 MIN: CPT | Performed by: NURSE PRACTITIONER

## 2022-02-23 PROCEDURE — 1036F TOBACCO NON-USER: CPT | Performed by: NURSE PRACTITIONER

## 2022-02-23 NOTE — PROGRESS NOTES
Virtual Regular Visit    Verification of patient location:    Patient is located in the following state in which I hold an active license [place]      Assessment/Plan:    Problem List Items Addressed This Visit     None               Reason for visit is   Chief Complaint   Patient presents with    Sore Throat        Encounter provider SHEEBA Govea    Provider located at [facility] AT [place] [address] [address]  [address] [address]      Recent Visits  No visits were found meeting these conditions  Showing recent visits within past 7 days and meeting all other requirements  Today's Visits  Date Type Provider Dept   02/23/22 Telemedicine Lesley Benavides, [address]   Showing today's visits and meeting all other requirements  Future Appointments  No visits were found meeting these conditions  Showing future appointments within next 150 days and meeting all other requirements       The patient was identified by name and date of birth  Jp Beltre was informed that this is a telemedicine visit and that the visit is being conducted through [address] [place] [address] Road Now and patient was informed that this is a secure, HIPAA-compliant platform  She agrees to proceed     My office door was closed  No one else was in the room  She acknowledged consent and understanding of privacy and security of the video platform  The patient has agreed to participate and understands they can discontinue the visit at any time  Patient is aware this is a billable service  Subjective  Jp Beltre is a 60 y o  female right side facial and neck swelling   Presents today via telemedicine for concern over swelling in numbness on the right side of her cheek down into her neck  On Monday February 21st she had some dental work done that required Novocain    The next day she noticed swelling in the right cheek around the jaw extending down her neck with continued numbness in those areas as well  She denies any difficulty swallowing, the feeling of something in her throat when she swallows, or difficulty breathing or shortness of breath  Patient was advised to call the dentist as this is most likely related to the dental work she had done on Monday  Patient was advised to call as soon as possible to see her dentist        Past Medical History:   Diagnosis Date    COVID-19 12/11/2020    Disease of thyroid gland     Gastroesophageal reflux disease without esophagitis 9/18/2019    H  pylori infection     Seasonal allergies        Past Surgical History:   Procedure Laterality Date    COLONOSCOPY N/A 2/22/2016    Procedure: COLONOSCOPY;  Surgeon: Alden Gonzalez MD;  Location: [facility]; Service:        Current Outpatient Medications   Medication Sig Dispense Refill    fexofenadine (ALLEGRA) 180 MG tablet Take 180 mg by mouth daily      fluticasone (FLONASE) 50 mcg/act nasal spray 2 sprays into each nostril daily 48 mL 12    levothyroxine 25 mcg tablet TAKE 1 TABLET BY MOUTH EVERY DAY 90 tablet 3    omeprazole (PriLOSEC) 40 MG capsule TAKE 1 CAPSULE BY MOUTH EVERY DAY 90 capsule 2    benzonatate (TESSALON) 200 MG capsule Take 1 capsule (200 mg total) by mouth 3 (three) times a day as needed for cough 20 capsule 0     No current facility-administered medications for this visit  Allergies   Allergen Reactions    Escitalopram      Other reaction(s): Palpitations  Other reaction(s): Palpitations       Review of Systems   Constitutional: Negative for chills, fatigue and fever  HENT: Negative for dental problem, sore throat and trouble swallowing  Respiratory: Negative for cough, shortness of breath and wheezing  Cardiovascular: Negative for palpitations  Video Exam    Vitals:    02/23/22 1004   Temp: (!) 96 °F (35 6 °C)   TempSrc: Temporal       Physical Exam  Constitutional:       Appearance: She is well-developed  HENT:      Head:      Jaw: Swelling present   No tenderness or pain on movement  Pulmonary:      Effort: Pulmonary effort is normal  No respiratory distress (No dyspnea or cough noted while conversing)  Musculoskeletal:      Cervical back: Edema (right side only) present  No erythema  No pain with movement  Normal range of motion  Neurological:      Mental Status: She is alert and oriented to person, place, and time  Psychiatric:         Mood and Affect: Mood normal          Behavior: Behavior normal           I spent 15 minutes with patient today in which greater than 50% of the time was spent in counseling/coordination of care regarding right side cheek, jaw and neck swelling and numbness    VIRTUAL VISIT [address] verbally agrees to participate in [employer]  Pt is aware that [employer] could be limited without vital signs or the ability to perform a full hands-on physical exam  Marie Lloyd understands she or the provider may request at any time to terminate the video visit and request the patient to seek care or treatment in person

## 2022-02-23 NOTE — TELEPHONE ENCOUNTER
Patient would like to know if she have a referral fax to [phone] for Dr Sena Nguyen   She went to the dentist and they refer her to see the specialist  Please advise
Referral entered
Referral fax 
[Negative] : Genitourinary

## 2022-04-08 ENCOUNTER — OFFICE VISIT (OUTPATIENT)
Dept: FAMILY MEDICINE CLINIC | Facility: CLINIC | Age: 57
End: 2022-04-08
Payer: COMMERCIAL

## 2022-04-08 VITALS
HEART RATE: 114 BPM | BODY MASS INDEX: 28.53 KG/M2 | SYSTOLIC BLOOD PRESSURE: 132 MMHG | TEMPERATURE: 99.1 F | OXYGEN SATURATION: 97 % | WEIGHT: 156 LBS | DIASTOLIC BLOOD PRESSURE: 82 MMHG

## 2022-04-08 DIAGNOSIS — R35.0 URINARY FREQUENCY: Primary | ICD-10-CM

## 2022-04-08 DIAGNOSIS — R31.29 MICROSCOPIC HEMATURIA: ICD-10-CM

## 2022-04-08 LAB
BACTERIA UR QL AUTO: ABNORMAL /HPF
BILIRUB UR QL STRIP: NEGATIVE
CLARITY UR: CLEAR
COLOR UR: ABNORMAL
GLUCOSE UR STRIP-MCNC: NEGATIVE MG/DL
HGB UR QL STRIP.AUTO: ABNORMAL
KETONES UR STRIP-MCNC: NEGATIVE MG/DL
LEUKOCYTE ESTERASE UR QL STRIP: NEGATIVE
NITRITE UR QL STRIP: NEGATIVE
NON-SQ EPI CELLS URNS QL MICRO: ABNORMAL /HPF
PH UR STRIP.AUTO: 5.5 [PH]
PROT UR STRIP-MCNC: NEGATIVE MG/DL
RBC #/AREA URNS AUTO: ABNORMAL /HPF
SL AMB  POCT GLUCOSE, UA: NEGATIVE
SL AMB LEUKOCYTE ESTERASE,UA: ABNORMAL
SL AMB POCT BILIRUBIN,UA: NEGATIVE
SL AMB POCT BLOOD,UA: ABNORMAL
SL AMB POCT CLARITY,UA: CLEAR
SL AMB POCT COLOR,UA: YELLOW
SL AMB POCT KETONES,UA: NEGATIVE
SL AMB POCT NITRITE,UA: NEGATIVE
SL AMB POCT PH,UA: 6
SL AMB POCT SPECIFIC GRAVITY,UA: 1.01
SL AMB POCT URINE PROTEIN: NEGATIVE
SL AMB POCT UROBILINOGEN: NEGATIVE
SP GR UR STRIP.AUTO: 1.01 (ref 1–1.03)
UROBILINOGEN UR STRIP-ACNC: <2 MG/DL
WBC #/AREA URNS AUTO: ABNORMAL /HPF

## 2022-04-08 PROCEDURE — 81001 URINALYSIS AUTO W/SCOPE: CPT | Performed by: FAMILY MEDICINE

## 2022-04-08 PROCEDURE — 81002 URINALYSIS NONAUTO W/O SCOPE: CPT | Performed by: FAMILY MEDICINE

## 2022-04-08 PROCEDURE — 99214 OFFICE O/P EST MOD 30 MIN: CPT | Performed by: FAMILY MEDICINE

## 2022-04-08 PROCEDURE — 1036F TOBACCO NON-USER: CPT | Performed by: FAMILY MEDICINE

## 2022-04-08 RX ORDER — NITROFURANTOIN 25; 75 MG/1; MG/1
100 CAPSULE ORAL 2 TIMES DAILY
Qty: 10 CAPSULE | Refills: 0 | Status: SHIPPED | OUTPATIENT
Start: 2022-04-08 | End: 2022-04-13

## 2022-04-08 NOTE — PROGRESS NOTES
Assessment/Plan:      1  Urinary frequency  Assessment & Plan:  Suspect acute cystitis  Urine dip: Large blood and small leuks  Positive suprapubic tenderness  No cva tenderness  Will also send for micro and culture  Start macrobid for 5 days  Discussed return precautions      Orders:  -     nitrofurantoin (MACROBID) 100 mg capsule; Take 1 capsule (100 mg total) by mouth 2 (two) times a day for 5 days  -     POCT urine dip  -     UA w Reflex to Microscopic w Reflex to Culture - Clinic Collect    2  Microscopic hematuria  -     US kidney and bladder with pvr; Future; Expected date: 04/08/2022  -     nitrofurantoin (MACROBID) 100 mg capsule; Take 1 capsule (100 mg total) by mouth 2 (two) times a day for 5 days  -     POCT urine dip  -     UA w Reflex to Microscopic w Reflex to Culture - Clinic Collect          Subjective:      Patient ID: Alexia Whiteside is a 60 y o  female  3 days of waxing and waning lower abdominal pain  This is brand new  She tried immodium which slightly helped  Admits to urinary frequency and urgency  Denies dysuria  HPI    The following portions of the patient's history were reviewed and updated as appropriate: allergies, current medications, past family history, past medical history, past social history, past surgical history and problem list     Review of Systems   Gastrointestinal: Positive for abdominal pain  Negative for anal bleeding, blood in stool, constipation, diarrhea, nausea, rectal pain and vomiting  Genitourinary: Positive for frequency  Negative for decreased urine volume, difficulty urinating, dyspareunia, dysuria, flank pain, hematuria, urgency, vaginal bleeding, vaginal discharge and vaginal pain  Objective:      /82 (BP Location: Left arm, Patient Position: Sitting, Cuff Size: Standard)   Pulse (!) 114   Temp 99 1 °F (37 3 °C) (Temporal)   Wt 70 8 kg (156 lb)   SpO2 97%   BMI 28 53 kg/m²          Physical Exam  Vitals reviewed     Constitutional: General: She is not in acute distress  Appearance: She is well-developed  She is not diaphoretic  HENT:      Head: Normocephalic and atraumatic  Right Ear: External ear normal       Left Ear: External ear normal       Nose: Nose normal  No congestion  Mouth/Throat:      Mouth: Mucous membranes are moist       Pharynx: Oropharynx is clear  No oropharyngeal exudate  Eyes:      General: No scleral icterus  Right eye: No discharge  Left eye: No discharge  Conjunctiva/sclera: Conjunctivae normal       Pupils: Pupils are equal, round, and reactive to light  Neck:      Thyroid: No thyromegaly  Vascular: No JVD  Trachea: No tracheal deviation  Cardiovascular:      Rate and Rhythm: Normal rate and regular rhythm  Heart sounds: Normal heart sounds  No murmur heard  No friction rub  No gallop  Pulmonary:      Effort: Pulmonary effort is normal  No respiratory distress  Breath sounds: Normal breath sounds  No wheezing or rales  Chest:      Chest wall: No tenderness  Abdominal:      General: Bowel sounds are normal  There is no distension  Palpations: Abdomen is soft  Tenderness: There is abdominal tenderness in the suprapubic area  There is no right CVA tenderness, left CVA tenderness, guarding or rebound  Musculoskeletal:         General: Normal range of motion  Cervical back: Normal range of motion and neck supple  No tenderness  Right lower leg: No edema  Left lower leg: No edema  Lymphadenopathy:      Cervical: No cervical adenopathy  Skin:     General: Skin is warm and dry  Neurological:      Mental Status: She is alert and oriented to person, place, and time  Mental status is at baseline  Psychiatric:         Mood and Affect: Mood normal          Behavior: Behavior normal          Thought Content:  Thought content normal          Judgment: Judgment normal

## 2022-04-08 NOTE — ASSESSMENT & PLAN NOTE
Suspect acute cystitis  Urine dip: Large blood and small leuks  Positive suprapubic tenderness   No cva tenderness  Will also send for micro and culture  Start macrobid for 5 days  Discussed return precautions

## 2022-04-15 ENCOUNTER — HOSPITAL ENCOUNTER (OUTPATIENT)
Dept: ULTRASOUND IMAGING | Facility: MEDICAL CENTER | Age: 57
Discharge: HOME | End: 2022-04-15
Payer: COMMERCIAL

## 2022-04-15 DIAGNOSIS — R31.29 MICROSCOPIC HEMATURIA: ICD-10-CM

## 2022-04-15 PROCEDURE — 76770 US EXAM ABDO BACK WALL COMP: CPT

## 2022-04-22 ENCOUNTER — TELEPHONE (OUTPATIENT)
Dept: FAMILY MEDICINE CLINIC | Facility: CLINIC | Age: 57
End: 2022-04-22

## 2022-05-09 ENCOUNTER — OFFICE VISIT (OUTPATIENT)
Dept: FAMILY MEDICINE CLINIC | Facility: CLINIC | Age: 57
End: 2022-05-09
Payer: COMMERCIAL

## 2022-05-09 VITALS
HEIGHT: 61 IN | WEIGHT: 156.4 LBS | TEMPERATURE: 97.4 F | BODY MASS INDEX: 29.53 KG/M2 | SYSTOLIC BLOOD PRESSURE: 128 MMHG | OXYGEN SATURATION: 98 % | HEART RATE: 104 BPM | RESPIRATION RATE: 18 BRPM | DIASTOLIC BLOOD PRESSURE: 82 MMHG

## 2022-05-09 DIAGNOSIS — J30.1 ALLERGIC RHINITIS DUE TO POLLEN, UNSPECIFIED SEASONALITY: ICD-10-CM

## 2022-05-09 DIAGNOSIS — M26.609 TMJ (TEMPOROMANDIBULAR JOINT SYNDROME): Primary | ICD-10-CM

## 2022-05-09 DIAGNOSIS — R31.29 MICROSCOPIC HEMATURIA: ICD-10-CM

## 2022-05-09 PROCEDURE — 99214 OFFICE O/P EST MOD 30 MIN: CPT | Performed by: FAMILY MEDICINE

## 2022-05-09 PROCEDURE — 1036F TOBACCO NON-USER: CPT | Performed by: FAMILY MEDICINE

## 2022-05-09 PROCEDURE — 3008F BODY MASS INDEX DOCD: CPT | Performed by: FAMILY MEDICINE

## 2022-05-09 NOTE — PATIENT INSTRUCTIONS
Temporomandibular Disorder   WHAT YOU NEED TO KNOW:   Temporomandibular disorder is a condition that causes pain in your jaw  The disorder affects the joint between your temporal bone and your mandible (jawbone)  The muscles and nerves around the joint are also affected  DISCHARGE INSTRUCTIONS:   Medicines:   · Pain medicine: You may be given a prescription medicine to decrease pain  Do not wait until the pain is severe before you take this medicine  · NSAIDs:  These medicines decrease swelling and pain  You can buy NSAIDs without a doctor's order  Ask your healthcare provider which medicine is right for you, and how much to take  Take as directed  NSAIDs can cause stomach bleeding or kidney problems if not taken correctly  · Muscle relaxers  help decrease pain and muscle spasms  · Take your medicine as directed  Contact your healthcare provider if you think your medicine is not helping or if you have side effects  Tell him or her if you are allergic to any medicine  Keep a list of the medicines, vitamins, and herbs you take  Include the amounts, and when and why you take them  Bring the list or the pill bottles to follow-up visits  Carry your medicine list with you in case of an emergency  Follow up with your doctor as directed:  Write down your questions so you remember to ask them during your visits  Manage your symptoms:   · Eat soft foods: Your healthcare provider may suggest that you eat only soft foods for several days  A dietitian may work with you to find foods that are easier to bite, chew, or swallow  Examples are soup, applesauce, cottage cheese, pudding, yogurt, and soft fruits  · Use jaw supporting devices:  Splints may be used to support your jaw or keep it from moving  You may need to wear a mouth guard to keep you from clenching or grinding your teeth while you are sleeping  · Use ice and heat:  Ice helps decrease swelling and pain   Ice may also help prevent tissue damage  Use an ice pack, or put crushed ice in a plastic bag  Cover it with a towel and place it on your jaw for 15 to 20 minutes every hour or as directed  After the first 24 to 48 hours, use heat to decrease pain, swelling, and muscle spasms  Apply heat on the area for 20 to 30 minutes every 2 hours for as many days as directed  Use a heating pad, moist warm compress, or a hot water bottle  · Go to physical therapy:  A physical therapist teaches you exercises to help improve movement and strength, and to decrease pain in your jaw  A speech therapist may also help you with swallowing and speech exercises  Contact your healthcare provider if:   · You have a fever  · Your splint or mouth guard is loose  · You have questions or concerns about your condition or care  Seek care immediately or call 911 if:   · You have nausea, are vomiting, or cannot keep liquids down  · You have pain that does not go away even after you take your pain medicine  · You have problems breathing, talking, drinking, eating, or swallowing  · Your splint or mouth guard gets damaged or broken  © Copyright [employer] 2022 Information is for End User's use only and may not be sold, redistributed or otherwise used for commercial purposes  All illustrations and images included in CareNotes® are the copyrighted property of A D A M [employer] Inc  or [employer]  The above information is an  only  It is not intended as medical advice for individual conditions or treatments  Talk to your doctor, nurse or pharmacist before following any medical regimen to see if it is safe and effective for you

## 2022-05-09 NOTE — ASSESSMENT & PLAN NOTE
New  · Normal renal/bladder ultrasound  · Will refer to urology to complete work up of hematuria with possible cystoscopy  · All questions answered

## 2022-05-09 NOTE — ASSESSMENT & PLAN NOTE
Recurrent  · Patient already wears mouth guard at night  · Now having clicking and TMJ pain on right  · Start ibuprofen and tylenol as needed  · Discussed avoidance of hard foods, handout provided in avs  · May also try muscle relaxed but patient declines today  · Follow up as needed

## 2022-05-09 NOTE — PROGRESS NOTES
BMI Counseling: Body mass index is 29 55 kg/m²  The BMI is above normal  Nutrition recommendations include decreasing portion sizes, encouraging healthy choices of fruits and vegetables, decreasing fast food intake, limiting drinks that contain sugar, moderation in carbohydrate intake, reducing intake of saturated and trans fat and reducing intake of cholesterol  Exercise recommendations include moderate physical activity 150 minutes/week and exercising 3-5 times per week  Rationale for BMI follow-up plan is due to patient being overweight or obese  Assessment/Plan:      1  TMJ (temporomandibular joint syndrome)  Assessment & Plan:  Recurrent  Patient already wears mouth guard at night  Now having clicking and TMJ pain on right  Start ibuprofen and tylenol as needed  Discussed avoidance of hard foods, handout provided in avs  May also try muscle relaxed but patient declines today  Follow up as needed      2  BMI 29 0-29 9,adult    3  Microscopic hematuria  Assessment & Plan:  New  Normal renal/bladder ultrasound  Will refer to urology to complete work up of hematuria with possible cystoscopy  All questions answered    Orders:  -     Ambulatory Referral to Urology; Future    4  Allergic rhinitis due to pollen, unspecified seasonality  Assessment & Plan:  Continue flonase and allegra prn  May also try claritin or zyrtec              Subjective:      Patient ID: David Loya is a 60 y o  female presents today for right sided facial swelling, sinus pressure, and ear congestion  Started 3 days ago  Patient took pseudofed which helped   Has a history of TMJ and follows with her dentist who prescribed a mouth guard for her    HPI    The following portions of the patient's history were reviewed and updated as appropriate: allergies, current medications, past family history, past medical history, past social history, past surgical history and problem list     Review of Systems   Constitutional: Negative for activity change, chills, diaphoresis and fever  HENT: Positive for sinus pressure and sinus pain  Negative for congestion, ear pain, hearing loss, postnasal drip, rhinorrhea, sneezing and sore throat  Respiratory: Negative for cough, chest tightness, shortness of breath and wheezing  Cardiovascular: Negative for chest pain, palpitations and leg swelling  Gastrointestinal: Negative for abdominal pain, blood in stool, constipation, diarrhea, nausea and vomiting  Genitourinary: Negative for dysuria, frequency, hematuria and urgency  Musculoskeletal: Negative for arthralgias and myalgias  Neurological: Negative for dizziness, syncope, weakness, light-headedness, numbness and headaches  Objective:      /82 (BP Location: Left arm, Patient Position: Sitting, Cuff Size: Large)   Pulse 104   Temp (!) 97 4 °F (36 3 °C) (Tympanic)   Resp 18   Ht 5' 1" (1 549 m)   Wt 70 9 kg (156 lb 6 4 oz)   SpO2 98%   BMI 29 55 kg/m²          Physical Exam  Vitals reviewed  Constitutional:       General: She is not in acute distress  Appearance: She is well-developed  She is not diaphoretic  HENT:      Head: Normocephalic and atraumatic  Jaw: Tenderness, swelling and pain on movement present  Right Ear: External ear normal       Left Ear: External ear normal       Nose: Nose normal  No congestion  Mouth/Throat:      Mouth: Mucous membranes are moist       Pharynx: Oropharynx is clear  No oropharyngeal exudate  Eyes:      General: No scleral icterus  Right eye: No discharge  Left eye: No discharge  Conjunctiva/sclera: Conjunctivae normal       Pupils: Pupils are equal, round, and reactive to light  Neck:      Thyroid: No thyromegaly  Vascular: No JVD  Trachea: No tracheal deviation  Cardiovascular:      Rate and Rhythm: Normal rate and regular rhythm  Heart sounds: Normal heart sounds  No murmur heard  No friction rub  No gallop  Pulmonary:      Effort: Pulmonary effort is normal  No respiratory distress  Breath sounds: Normal breath sounds  No wheezing or rales  Chest:      Chest wall: No tenderness  Abdominal:      General: Bowel sounds are normal  There is no distension  Palpations: Abdomen is soft  Tenderness: There is no abdominal tenderness  There is no right CVA tenderness, left CVA tenderness, guarding or rebound  Musculoskeletal:         General: Normal range of motion  Cervical back: Normal range of motion and neck supple  No tenderness  Right lower leg: No edema  Left lower leg: No edema  Lymphadenopathy:      Cervical: No cervical adenopathy  Skin:     General: Skin is warm and dry  Neurological:      Mental Status: She is alert and oriented to person, place, and time  Mental status is at baseline  Psychiatric:         Mood and Affect: Mood normal          Behavior: Behavior normal          Thought Content:  Thought content normal          Judgment: Judgment normal

## 2022-05-24 ENCOUNTER — TELEPHONE (OUTPATIENT)
Dept: UROLOGY | Facility: AMBULATORY SURGERY CENTER | Age: 57
End: 2022-05-24

## 2022-05-24 NOTE — TELEPHONE ENCOUNTER
Insurance referral completed via [employer] 05/24/22, referral number [id number] valid from 05/24/22 to 08/21/22  Copy of referral scanned into chart

## 2022-05-24 NOTE — TELEPHONE ENCOUNTER
Hello,    Patient is scheduled to be seen at [address] [place] [address] Urology (NPI: [id number]) on 6/8, and needs an insurance referral   The diagnosis code for the appointment will be R31 29, and the procedure code will be [id number]  Thank you for your time and help

## 2022-06-08 ENCOUNTER — OFFICE VISIT (OUTPATIENT)
Dept: UROLOGY | Facility: AMBULATORY SURGERY CENTER | Age: 57
End: 2022-06-08
Payer: COMMERCIAL

## 2022-06-08 VITALS
WEIGHT: 153 LBS | SYSTOLIC BLOOD PRESSURE: 138 MMHG | BODY MASS INDEX: 28.89 KG/M2 | DIASTOLIC BLOOD PRESSURE: 92 MMHG | OXYGEN SATURATION: 96 % | HEART RATE: 118 BPM | HEIGHT: 61 IN

## 2022-06-08 DIAGNOSIS — R31.29 MICROSCOPIC HEMATURIA: Primary | ICD-10-CM

## 2022-06-08 LAB
BACTERIA UR QL AUTO: NORMAL /HPF
BILIRUB UR QL STRIP: NEGATIVE
CLARITY UR: CLEAR
COLOR UR: COLORLESS
GLUCOSE UR STRIP-MCNC: NEGATIVE MG/DL
HGB UR QL STRIP.AUTO: NEGATIVE
KETONES UR STRIP-MCNC: NEGATIVE MG/DL
LEUKOCYTE ESTERASE UR QL STRIP: NEGATIVE
NITRITE UR QL STRIP: NEGATIVE
NON-SQ EPI CELLS URNS QL MICRO: NORMAL /HPF
PH UR STRIP.AUTO: 7.5 [PH]
PROT UR STRIP-MCNC: NEGATIVE MG/DL
RBC #/AREA URNS AUTO: NORMAL /HPF
SL AMB  POCT GLUCOSE, UA: NORMAL
SL AMB LEUKOCYTE ESTERASE,UA: NORMAL
SL AMB POCT BILIRUBIN,UA: NORMAL
SL AMB POCT BLOOD,UA: NORMAL
SL AMB POCT CLARITY,UA: CLEAR
SL AMB POCT COLOR,UA: YELLOW
SL AMB POCT KETONES,UA: NORMAL
SL AMB POCT NITRITE,UA: NORMAL
SL AMB POCT PH,UA: 8
SL AMB POCT SPECIFIC GRAVITY,UA: 1
SL AMB POCT URINE PROTEIN: NORMAL
SL AMB POCT UROBILINOGEN: 0.2
SP GR UR STRIP.AUTO: 1.01 (ref 1–1.03)
UROBILINOGEN UR STRIP-ACNC: <2 MG/DL
WBC #/AREA URNS AUTO: NORMAL /HPF

## 2022-06-08 PROCEDURE — 99203 OFFICE O/P NEW LOW 30 MIN: CPT | Performed by: NURSE PRACTITIONER

## 2022-06-08 PROCEDURE — 3008F BODY MASS INDEX DOCD: CPT | Performed by: NURSE PRACTITIONER

## 2022-06-08 PROCEDURE — 1036F TOBACCO NON-USER: CPT | Performed by: NURSE PRACTITIONER

## 2022-06-08 PROCEDURE — 81001 URINALYSIS AUTO W/SCOPE: CPT | Performed by: NURSE PRACTITIONER

## 2022-06-08 PROCEDURE — 81002 URINALYSIS NONAUTO W/O SCOPE: CPT | Performed by: NURSE PRACTITIONER

## 2022-06-08 NOTE — PROGRESS NOTES
06/08/22    Dora Cervantes   1965   [id number]     Assessment  1 Microscopic hematuria     Discussion/Plan  1 Microscopic hematuria    4/8/22 UA 4-10 RBC/hpf   4/15/22 Renal US Simple left renal cyst, otherwise normal study  Urine dip in office: positive blood, sent for UA microscopic    Cystoscopy recommended - patient agreeable    Reviewed possible causes of microscopic hematuria  Encouraged increased hydration with water     Await repeat urine studies  Patient will schedule cystoscopy  Emotional support provided  All questions answered at this time  Subjective  HPI   Dora Cervantes is a 60year old female who presents in consultation for evaluation of microscopic hematuria  She has not seen visible blood in urine  She hydrates adequately with water  She has 1 cup of coffee in the AM  She is a lifetime nonsmoker, however, she was exposed to second hand smoke as a child  Her father smoked indoors  She often gets sinus infections and developed allergies due to this  She denies dysuria or painful urination  Denies constipation  She drinks Anise tea to help aid in sleep  She saw her PCP 4/8/22 for reports of suprapubic pressure  UA showed 4-10 RBC/hpf  She was prescribed Macrobid for presumed UTI  Her symptoms resolved  Renal US was normal with unremarkable PVR and no stones  She denies family history of  malignancies  Positive family history of diabetes       Review of Systems - History obtained from chart review and the patient  General ROS: negative  Psychological ROS: negative  Hematological and Lymphatic ROS: negative  Breast ROS: negative for breast lumps  Respiratory ROS: no cough, shortness of breath, or wheezing  Cardiovascular ROS: no chest pain or dyspnea on exertion  Gastrointestinal ROS: no abdominal pain, change in bowel habits, or black or bloody stools  Genito-Urinary ROS: negative  Musculoskeletal ROS: negative  Neurological ROS: no TIA or stroke symptoms  Dermatological ROS: negative Objective  Physical Exam  Vitals and nursing note reviewed  Constitutional:       General: She is not in acute distress  Appearance: Normal appearance  She is not ill-appearing, toxic-appearing or diaphoretic  HENT:      Head: Normocephalic and atraumatic  Pulmonary:      Effort: Pulmonary effort is normal  No respiratory distress  Abdominal:      Tenderness: There is no right CVA tenderness or left CVA tenderness  Musculoskeletal:         General: Normal range of motion  Cervical back: Normal range of motion  Skin:     General: Skin is warm and dry  Neurological:      General: No focal deficit present  Mental Status: She is alert and oriented to person, place, and time  Mental status is at baseline  Psychiatric:         Attention and Perception: Attention normal          Mood and Affect: Mood is anxious  Affect is tearful  Behavior: Behavior normal          Thought Content: Thought content normal          Judgment: Judgment normal            RENAL ULTRASOUND     INDICATION:   R31 29: Other microscopic hematuria      COMPARISON: None     TECHNIQUE:   Ultrasound of the retroperitoneum was performed with a curvilinear transducer utilizing volumetric sweeps and still imaging techniques       FINDINGS:     KIDNEYS:  Symmetric and normal size  Right kidney:  11 4 x 5 2 x 5 0 cm  Volume 153 2 mL  Left kidney:  11 9 x 4 9 x 5 2 cm  Volume 158 7 mL     Right kidney  Normal echogenicity and contour  No mass is identified  No hydronephrosis  No shadowing calculi  No perinephric fluid collections      Left kidney  Normal echogenicity and contour  No mass is identified  13 mm simple left renal cyst is noted  No hydronephrosis  No shadowing calculi  No perinephric fluid collections      URETERS:  Nonvisualized      BLADDER:   Normally distended  No focal thickening or mass lesions  Bilateral ureteral jets detected  Prevoid bladder volume of 266 cc noted   Post void bladder volume of 3 cc is noted         IMPRESSION:     Simple left renal cyst, otherwise normal study  SHEEBA Flores     I have spent 15 minutes with Patient and family today in which greater than 50% of this time was spent in counseling/coordination of care regarding Diagnostic results, Prognosis, Risks and benefits of tx options, Patient and family education, Importance of tx compliance, Risk factor reductions and Impressions

## 2022-06-28 ENCOUNTER — TELEPHONE (OUTPATIENT)
Dept: UROLOGY | Facility: AMBULATORY SURGERY CENTER | Age: 57
End: 2022-06-28

## 2022-06-28 NOTE — TELEPHONE ENCOUNTER
Appointment canceled for Conrad Burk ([id number])   Visit Type: UROLOGY CYSTO PROCEDURE [facility]   Date        Time      Length    Provider                  Department   7/29/2022    3:00 PM  30 mins  Perry Perez MD         [facility]      Reason for Cancellation: Feeling Better

## 2022-08-24 DIAGNOSIS — K21.9 GASTROESOPHAGEAL REFLUX DISEASE WITHOUT ESOPHAGITIS: ICD-10-CM

## 2022-08-25 RX ORDER — OMEPRAZOLE 40 MG/1
CAPSULE, DELAYED RELEASE ORAL
Qty: 90 CAPSULE | Refills: 2 | Status: SHIPPED | OUTPATIENT
Start: 2022-08-25

## 2022-11-17 ENCOUNTER — OFFICE VISIT (OUTPATIENT)
Dept: FAMILY MEDICINE CLINIC | Facility: CLINIC | Age: 57
End: 2022-11-17

## 2022-11-17 VITALS
WEIGHT: 158.4 LBS | DIASTOLIC BLOOD PRESSURE: 98 MMHG | HEIGHT: 61 IN | HEART RATE: 93 BPM | TEMPERATURE: 97.3 F | SYSTOLIC BLOOD PRESSURE: 124 MMHG | BODY MASS INDEX: 29.91 KG/M2 | OXYGEN SATURATION: 98 %

## 2022-11-17 DIAGNOSIS — R31.29 MICROSCOPIC HEMATURIA: ICD-10-CM

## 2022-11-17 DIAGNOSIS — E03.9 ACQUIRED HYPOTHYROIDISM: ICD-10-CM

## 2022-11-17 DIAGNOSIS — U07.1 COVID-19 VIRUS INFECTION: ICD-10-CM

## 2022-11-17 DIAGNOSIS — E78.49 OTHER HYPERLIPIDEMIA: Chronic | ICD-10-CM

## 2022-11-17 DIAGNOSIS — J30.1 ALLERGIC RHINITIS DUE TO POLLEN, UNSPECIFIED SEASONALITY: Primary | ICD-10-CM

## 2022-11-17 DIAGNOSIS — K21.9 GASTROESOPHAGEAL REFLUX DISEASE WITHOUT ESOPHAGITIS: Chronic | ICD-10-CM

## 2022-11-17 PROBLEM — M26.609 TMJ (TEMPOROMANDIBULAR JOINT SYNDROME): Status: RESOLVED | Noted: 2022-05-09 | Resolved: 2022-11-17

## 2022-11-17 PROBLEM — R35.0 URINARY FREQUENCY: Status: RESOLVED | Noted: 2022-04-08 | Resolved: 2022-11-17

## 2022-11-17 NOTE — PROGRESS NOTES
Assessment/Plan:    No problem-specific Assessment & Plan notes found for this encounter  Diagnoses and all orders for this visit:    Allergic rhinitis due to pollen, unspecified seasonality    Acquired hypothyroidism          Subjective:      Patient ID: Agustina Nelson is a 59 y o  female  PATIENT RETURNS FOR FOLLOW-UP OF CHRONIC MEDICAL CONDITIONS  ANY HOSPITAL VISITS, EMERGENCY VISITS AND OTHER PROVIDER VISITS SINCE LAST TIME WERE REVIEWED  MEDS WERE REVIEWED AND NO SIDE EFFECTS  NO NEW ISSUES  UNLESS NOTED BELOW  NO NEW MEDICAL PROVIDER REPORTED  THE CHRONIC DISEASES LISTED ABOVE ARE STABLE AND UNCHANGED/ THE PLAN OF CARE FOR THOSE WILL REMAIN UNCHANGED UNLESS NOTED BELOW  URI sx 7 days several neg Covid tests including today   The following portions of the patient's history were reviewed and updated as appropriate: allergies, current medications, past family history, past medical history, past social history, past surgical history and problem list     Review of Systems   Constitutional: Negative for activity change and appetite change  HENT: Negative for voice change  Eyes: Negative for visual disturbance  Respiratory: Negative for chest tightness and shortness of breath  Cardiovascular: Negative for chest pain, palpitations and leg swelling  Gastrointestinal: Negative for abdominal pain, blood in stool, constipation and diarrhea  Genitourinary: Negative for dysuria, vaginal bleeding and vaginal discharge  Skin: Negative for rash  Neurological: Negative for dizziness  Psychiatric/Behavioral: Negative for dysphoric mood           Objective:  Vitals:    11/17/22 1444   BP: 124/98   BP Location: Left arm   Patient Position: Sitting   Cuff Size: Adult   Pulse: 93   Temp: (!) 97 3 °F (36 3 °C)   TempSrc: Temporal   SpO2: 98%   Weight: 71 8 kg (158 lb 6 4 oz)   Height: 5' 1 25" (1 556 m)      Physical Exam  Constitutional:       Appearance: She is well-developed and well-nourished  HENT:      Head: Normocephalic and atraumatic  Eyes:      Conjunctiva/sclera: Conjunctivae normal    Neck:      Thyroid: No thyromegaly  Cardiovascular:      Rate and Rhythm: Normal rate and regular rhythm  Pulses: Intact distal pulses  Heart sounds: Normal heart sounds  No murmur heard  Pulmonary:      Effort: Pulmonary effort is normal  No respiratory distress  Breath sounds: Normal breath sounds  Musculoskeletal:         General: No edema  Cervical back: Neck supple  Lymphadenopathy:      Cervical: No cervical adenopathy  Skin:     General: Skin is warm and dry  Psychiatric:         Mood and Affect: Mood and affect normal          Behavior: Behavior normal          Patient's chronic problems that were reviewed today are stable  Recent hospital stays reviewed  Recent labs and imaging reviewed  Recent visits to other providers reviewed  Meds reviewed and no changes made  Appropriate labs and imaging were ordered  Preventive measures appropriate for age and sex were reviewed with patient  Immunizations were updated as appropriate

## 2022-11-17 NOTE — PATIENT INSTRUCTIONS
I recommend switching her allergy pill to Claritin 10 mg daily  [facility]  Pap smear not due until next year  Colonoscopy not due until 2026

## 2022-11-24 DIAGNOSIS — R09.81 SINUS CONGESTION: ICD-10-CM

## 2022-11-26 RX ORDER — FLUTICASONE PROPIONATE 50 MCG
SPRAY, SUSPENSION (ML) NASAL
Qty: 48 ML | Refills: 12 | Status: SHIPPED | OUTPATIENT
Start: 2022-11-26

## 2022-12-06 LAB
CHOLEST SERPL-MCNC: 264 MG/DL
CHOLEST/HDLC SERPL: 6.3 (CALC)
HBA1C MFR BLD: 5.3 % OF TOTAL HGB
HDLC SERPL-MCNC: 42 MG/DL
LDLC SERPL CALC-MCNC: 184 MG/DL (CALC)
NONHDLC SERPL-MCNC: 222 MG/DL (CALC)
TRIGL SERPL-MCNC: 198 MG/DL
TSH SERPL-ACNC: 3.87 MIU/L (ref 0.4–4.5)

## 2022-12-12 ENCOUNTER — TELEPHONE (OUTPATIENT)
Dept: FAMILY MEDICINE CLINIC | Facility: CLINIC | Age: 57
End: 2022-12-12

## 2023-01-25 DIAGNOSIS — R51.9 NONINTRACTABLE HEADACHE, UNSPECIFIED CHRONICITY PATTERN, UNSPECIFIED HEADACHE TYPE: ICD-10-CM

## 2023-01-25 PROBLEM — R73.9 HYPERGLYCEMIA: Chronic | Status: ACTIVE | Noted: 2023-01-25

## 2023-01-25 RX ORDER — LEVOTHYROXINE SODIUM 0.03 MG/1
TABLET ORAL
Qty: 90 TABLET | Refills: 3 | Status: SHIPPED | OUTPATIENT
Start: 2023-01-25

## 2023-03-03 ENCOUNTER — VBI (OUTPATIENT)
Dept: ADMINISTRATIVE | Facility: OTHER | Age: 58
End: 2023-03-03

## 2023-04-25 ENCOUNTER — VBI (OUTPATIENT)
Dept: ADMINISTRATIVE | Facility: OTHER | Age: 58
End: 2023-04-25

## 2023-05-19 DIAGNOSIS — K21.9 GASTROESOPHAGEAL REFLUX DISEASE WITHOUT ESOPHAGITIS: ICD-10-CM

## 2023-05-19 RX ORDER — OMEPRAZOLE 40 MG/1
CAPSULE, DELAYED RELEASE ORAL
Qty: 90 CAPSULE | Refills: 2 | Status: SHIPPED | OUTPATIENT
Start: 2023-05-19

## 2023-07-26 ENCOUNTER — VBI (OUTPATIENT)
Dept: ADMINISTRATIVE | Facility: OTHER | Age: 58
End: 2023-07-26

## 2023-09-21 ENCOUNTER — OFFICE VISIT (OUTPATIENT)
Dept: FAMILY MEDICINE CLINIC | Facility: CLINIC | Age: 58
End: 2023-09-21

## 2023-09-21 VITALS
WEIGHT: 157.2 LBS | BODY MASS INDEX: 29.68 KG/M2 | DIASTOLIC BLOOD PRESSURE: 82 MMHG | HEART RATE: 108 BPM | HEIGHT: 61 IN | SYSTOLIC BLOOD PRESSURE: 126 MMHG | TEMPERATURE: 97.8 F | OXYGEN SATURATION: 96 %

## 2023-09-21 DIAGNOSIS — Z12.31 ENCOUNTER FOR SCREENING MAMMOGRAM FOR BREAST CANCER: ICD-10-CM

## 2023-09-21 DIAGNOSIS — E03.9 ACQUIRED HYPOTHYROIDISM: ICD-10-CM

## 2023-09-21 DIAGNOSIS — E78.49 OTHER HYPERLIPIDEMIA: Chronic | ICD-10-CM

## 2023-09-21 DIAGNOSIS — K21.9 GASTROESOPHAGEAL REFLUX DISEASE WITHOUT ESOPHAGITIS: Chronic | ICD-10-CM

## 2023-09-21 DIAGNOSIS — Z00.00 ANNUAL PHYSICAL EXAM: Primary | ICD-10-CM

## 2023-09-21 DIAGNOSIS — J31.0 CHRONIC RHINITIS: ICD-10-CM

## 2023-09-21 PROBLEM — B34.9 VIRAL INFECTION, UNSPECIFIED: Status: RESOLVED | Noted: 2022-01-04 | Resolved: 2023-09-21

## 2023-09-21 PROBLEM — R31.29 MICROSCOPIC HEMATURIA: Status: RESOLVED | Noted: 2022-05-09 | Resolved: 2023-09-21

## 2023-09-21 PROBLEM — U07.1 COVID-19 VIRUS INFECTION: Status: RESOLVED | Noted: 2020-12-14 | Resolved: 2023-09-21

## 2023-09-21 RX ORDER — FLUTICASONE PROPIONATE 50 MCG
2 SPRAY, SUSPENSION (ML) NASAL DAILY
Qty: 48 ML | Refills: 12 | Status: SHIPPED | OUTPATIENT
Start: 2023-09-21

## 2023-09-21 NOTE — PROGRESS NOTES
[address] [facility]    NAME: Joana Bello  AGE: 59 y.o. SEX: female  : 1965     DATE: 2023     Assessment and Plan:     Problem List Items Addressed This Visit        Digestive    Gastroesophageal reflux disease without esophagitis (Chronic)     Omeprazole 40mg             Endocrine    Acquired hypothyroidism     Levothyroxine 25mcg    TSH 3.78             Respiratory    Chronic rhinitis     Fluticisone 50mcg/act          Relevant Medications    fluticasone (FLONASE) 50 mcg/act nasal spray       Other    Other hyperlipidemia (Chronic)     Lipid panel    Cholesterol 264, Triglycerides 198, LDL 42,     Lifestyle modification: Incorporate regular exercise, avoid sugars and simple carbohydrates, weight loss and choosing healthier fats. Other Visit Diagnoses     Annual physical exam    -  Primary    Encounter for screening mammogram for breast cancer        Relevant Orders    Mammo screening bilateral w 3d & cad    BMI 29.0-29.9,adult              Immunizations and preventive care screenings were discussed with patient today. Appropriate education was printed on patient's after visit summary. Counseling:  Alcohol/drug use: discussed moderation in alcohol intake, the recommendations for healthy alcohol use, and avoidance of illicit drug use. Dental Health: discussed importance of regular tooth brushing, flossing, and dental visits. Injury prevention: discussed safety/seat belts, safety helmets, smoke detectors, carbon dioxide detectors, and smoking near bedding or upholstery. Sexual health: discussed sexually transmitted diseases, partner selection, use of condoms, avoidance of unintended pregnancy, and contraceptive alternatives. · Exercise: the importance of regular exercise/physical activity was discussed. Recommend exercise 3-5 times per week for at least 30 minutes.           Return in 1 year (on 9/21/2024) for Annual physical.     Chief Complaint:     Chief Complaint   Patient presents with   • Annual Exam      History of Present Illness:     Adult Annual Physical   Patient here for a comprehensive physical exam. The patient reports no problems. Diet and Physical Activity  · Diet/Nutrition: well balanced diet. · Exercise: 3-4 times a week on average. Depression Screening  PHQ-2/9 Depression Screening         General Health  · Sleep: sleeps well. · Hearing: normal - none . · Vision: wears glasses. · Dental: regular dental visits. /GYN Health  · Patient is: postmenopausal       Review of Systems:     Review of Systems   Constitutional: Negative for activity change, chills, fatigue and fever. HENT: Negative for congestion, ear pain, rhinorrhea, sore throat and trouble swallowing. Eyes: Negative for pain and visual disturbance. Respiratory: Negative for cough, chest tightness and shortness of breath. Cardiovascular: Negative for chest pain, palpitations and leg swelling. Gastrointestinal: Negative for abdominal pain, constipation, diarrhea, nausea and vomiting. Genitourinary: Negative for difficulty urinating, dysuria, hematuria and urgency. Musculoskeletal: Negative for arthralgias and back pain. Skin: Negative for color change and rash. Neurological: Negative for dizziness, seizures, syncope and headaches. Psychiatric/Behavioral: Negative for dysphoric mood. The patient is not nervous/anxious. All other systems reviewed and are negative. Past Medical History:     Past Medical History:   Diagnosis Date   • Allergic rhinitis 12/5/2014   • COVID-19 12/11/2020   • COVID-19 virus infection 12/14/2020    Sx began on 12/10/20: cough, congestion, CP and chills. Covid positive at [facility] on 12/14/20. CXR showed bibasilar infiltrates.  with similar Sx, seen and tested at [facility] on 12/18/20 and positive.  Telemedicine on 12/23/2020-she has a whole lot better and return to work as of 12/28/2020. • Disease of thyroid gland    • Elevated blood-pressure reading without diagnosis of hypertension 4/29/2016   • Gastroesophageal reflux disease without esophagitis 9/18/2019   • H. pylori infection    • Microscopic hematuria 5/9/2022    Minimal X1 ; U/S ok  Subsequent U/A ok    • Seasonal allergies    • Viral infection, unspecified 1/4/2022      Past Surgical History:     Past Surgical History:   Procedure Laterality Date   • COLONOSCOPY N/A 2/22/2016    Procedure: COLONOSCOPY;  Surgeon: Primo Gongora MD;  Location: [facility];   Service:       Social History:     Social History     Socioeconomic History   • Marital status: /Civil Union     Spouse name: None   • Number of children: None   • Years of education: None   • Highest education level: None   Occupational History   • None   Tobacco Use   • Smoking status: Never   • Smokeless tobacco: Never   • Tobacco comments:     no exposure to passive smoke   Vaping Use   • Vaping Use: Never used   Substance and Sexual Activity   • Alcohol use: No   • Drug use: No   • Sexual activity: None   Other Topics Concern   • None   Social History Narrative   • None     Social Determinants of Health     Financial Resource Strain: Not on file   Food Insecurity: Not on file   Transportation Needs: Not on file   Physical Activity: Not on file   Stress: Not on file   Social Connections: Not on file   Intimate Partner Violence: Not on file   Housing Stability: Not on file      Family History:     Family History   Problem Relation Age of Onset   • Diabetes Mother    • Alzheimer's disease Mother 67   • Throat cancer Father 66   • Diabetes Brother       Current Medications:     Current Outpatient Medications   Medication Sig Dispense Refill   • fexofenadine (ALLEGRA) 180 MG tablet Take 180 mg by mouth daily     • fluticasone (FLONASE) 50 mcg/act nasal spray 2 sprays into each nostril daily 48 mL 12   • levothyroxine 25 mcg tablet TAKE 1 TABLET BY MOUTH EVERY DAY 90 tablet 3   • omeprazole (PriLOSEC) 40 MG capsule TAKE 1 CAPSULE BY MOUTH EVERY DAY 90 capsule 2     No current facility-administered medications for this visit. Allergies: Allergies   Allergen Reactions   • Escitalopram      Other reaction(s): Palpitations  Other reaction(s): Palpitations      Physical Exam:     /82 (BP Location: Left arm, Patient Position: Sitting, Cuff Size: Adult)   Pulse (!) 108   Temp 97.8 °F (36.6 °C)   Ht 5' 1.25" (1.556 m)   Wt 71.3 kg (157 lb 3.2 oz)   SpO2 96%   BMI 29.46 kg/m²     Physical Exam  Vitals and nursing note reviewed. Constitutional:       General: She is not in acute distress. Appearance: Normal appearance. She is well-developed. HENT:      Head: Normocephalic and atraumatic. Right Ear: Tympanic membrane, ear canal and external ear normal. There is no impacted cerumen. Left Ear: Tympanic membrane, ear canal and external ear normal. There is no impacted cerumen. Nose: Nose normal.      Mouth/Throat:      Mouth: Mucous membranes are moist.   Eyes:      Conjunctiva/sclera: Conjunctivae normal.      Pupils: Pupils are equal, round, and reactive to light. Cardiovascular:      Rate and Rhythm: Normal rate and regular rhythm. Heart sounds: No murmur heard. Pulmonary:      Effort: Pulmonary effort is normal. No respiratory distress. Breath sounds: Normal breath sounds. Abdominal:      Palpations: Abdomen is soft. Tenderness: There is no abdominal tenderness. Musculoskeletal:         General: Normal range of motion. Cervical back: Neck supple. Right lower leg: No edema. Left lower leg: No edema. Skin:     General: Skin is warm and dry. Capillary Refill: Capillary refill takes less than 2 seconds. Neurological:      General: No focal deficit present. Mental Status: She is alert and oriented to person, place, and time. Mental status is at baseline. Psychiatric:         Mood and Affect: Mood normal.         Behavior: Behavior normal.        Patient Instructions       Wellness Visit for Adults   AMBULATORY CARE:   A wellness visit  is when you see your healthcare provider to get screened for health problems. Your healthcare provider will also give you advice on how to stay healthy. Write down your questions so you remember to ask them. Ask your healthcare provider how often you should have a wellness visit. What happens at a wellness visit:  Your healthcare provider will ask about your health, and your family history of health problems. This includes high blood pressure, heart disease, and cancer. He or she will ask if you have symptoms that concern you, if you smoke, and about your mood. You may also be asked about your intake of medicines, supplements, food, and alcohol. Any of the following may be done:  • Your weight  will be checked. Your height may also be checked so your body mass index (BMI) can be calculated. Your BMI shows if you are at a healthy weight. • Your blood pressure  and heart rate will be checked. Your temperature may also be checked. • Blood and urine tests  may be done. Blood tests may be done to check your cholesterol levels. Abnormal cholesterol levels increase your risk for heart disease and stroke. You may also need a blood or urine test to check for diabetes if you are at increased risk. Urine tests may be done to look for signs of an infection or kidney disease. • A physical exam  includes checking your heartbeat and lungs with a stethoscope. Your healthcare provider may also check your skin to look for sun damage. • Screening tests  may be recommended. A screening test is done to check for diseases that may not cause symptoms. The screening tests you may need depend on your age, gender, family history, and lifestyle habits.  For example, colorectal screening may be recommended if you are 48years old or older.    Screening tests you need if you are a woman:   • A Pap smear  is used to screen for cervical cancer. Pap smears are usually done every 3 to 5 years depending on your age. You may need them more often if you have had abnormal Pap smear test results in the past. Ask your healthcare provider how often you should have a Pap smear. • A mammogram  is an x-ray of your breasts to screen for breast cancer. Experts recommend mammograms every 2 years starting at age 48 years. You may need a mammogram at age 52 years or younger if you have an increased risk for breast cancer. Talk to your healthcare provider about when you should start having mammograms and how often you need them. Vaccines you may need:   • Get an influenza vaccine  every year. The influenza vaccine protects you from the flu. Several types of viruses cause the flu. The viruses change over time, so new vaccines are made each year. • Get a tetanus-diphtheria (Td) booster vaccine  every 10 years. This vaccine protects you against tetanus and diphtheria. Tetanus is a severe infection that may cause painful muscle spasms and lockjaw. Diphtheria is a severe bacterial infection that causes a thick covering in the back of your mouth and throat. • Get a human papillomavirus (HPV) vaccine  if you are female and aged 23 to 32 or male 23 to 24 and never received it. This vaccine protects you from HPV infection. HPV is the most common infection spread by sexual contact. HPV may also cause vaginal, penile, and anal cancers. • Get a pneumococcal vaccine  if you are aged 72 years or older. The pneumococcal vaccine is an injection given to protect you from pneumococcal disease. Pneumococcal disease is an infection caused by pneumococcal bacteria. The infection may cause pneumonia, meningitis, or an ear infection. • Get a shingles vaccine  if you are 60 or older, even if you have had shingles before.  The shingles vaccine is an injection to protect you from the varicella-zoster virus. This is the same virus that causes chickenpox. Shingles is a painful rash that develops in people who had chickenpox or have been exposed to the virus. How to eat healthy:  My Plate is a model for planning healthy meals. It shows the types and amounts of foods that should go on your plate. Fruits and vegetables make up about half of your plate, and grains and protein make up the other half. A serving of dairy is included on the side of your plate. The amount of calories and serving sizes you need depends on your age, gender, weight, and height. Examples of healthy foods are listed below:  • Eat a variety of vegetables  such as dark green, red, and orange vegetables. You can also include canned vegetables low in sodium (salt) and frozen vegetables without added butter or sauces. • Eat a variety of fresh fruits , canned fruit in 100% juice, frozen fruit, and dried fruit. • Include whole grains. At least half of the grains you eat should be whole grains. Examples include whole-wheat bread, wheat pasta, brown rice, and whole-grain cereals such as oatmeal.    • Eat a variety of protein foods such as seafood (fish and shellfish), lean meat, and poultry without skin (turkey and chicken). Examples of lean meats include pork leg, shoulder, or tenderloin, and beef round, sirloin, tenderloin, and extra lean ground beef. Other protein foods include eggs and egg substitutes, beans, peas, soy products, nuts, and seeds. • Choose low-fat dairy products such as skim or 1% milk or low-fat yogurt, cheese, and cottage cheese. • Limit unhealthy fats  such as butter, hard margarine, and shortening. Exercise:  Exercise at least 30 minutes per day on most days of the week. Some examples of exercise include walking, biking, dancing, and swimming. You can also fit in more physical activity by taking the stairs instead of the elevator or parking farther away from stores.  Include muscle strengthening activities 2 days each week. Regular exercise provides many health benefits. It helps you manage your weight, and decreases your risk for type 2 diabetes, heart disease, stroke, and high blood pressure. Exercise can also help improve your mood. Ask your healthcare provider about the best exercise plan for you. General health and safety guidelines:   • Do not smoke. Nicotine and other chemicals in cigarettes and cigars can cause lung damage. Ask your healthcare provider for information if you currently smoke and need help to quit. E-cigarettes or smokeless tobacco still contain nicotine. Talk to your healthcare provider before you use these products. • Limit alcohol. A drink of alcohol is 12 ounces of beer, 5 ounces of wine, or 1½ ounces of liquor. • Lose weight, if needed. Being overweight increases your risk of certain health conditions. These include heart disease, high blood pressure, type 2 diabetes, and certain types of cancer. • Protect your skin. Do not sunbathe or use tanning beds. Use sunscreen with a SPF 15 or higher. Apply sunscreen at least 15 minutes before you go outside. Reapply sunscreen every 2 hours. Wear protective clothing, hats, and sunglasses when you are outside. • Drive safely. Always wear your seatbelt. Make sure everyone in your car wears a seatbelt. A seatbelt can save your life if you are in an accident. Do not use your cell phone when you are driving. This could distract you and cause an accident. Pull over if you need to make a call or send a text message. • Practice safe sex. Use latex condoms if are sexually active and have more than one partner. Your healthcare provider may recommend screening tests for sexually transmitted infections (STIs). • Wear helmets, lifejackets, and protective gear. Always wear a helmet when you ride a bike or motorcycle, go skiing, or play sports that could cause a head injury.  Wear protective equipment when you play sports. Wear a lifejacket when you are on a boat or doing water sports. © Copyright [employer] 2023 Information is for End User's use only and may not be sold, redistributed or otherwise used for commercial purposes. The above information is an  only. It is not intended as medical advice for individual conditions or treatments. Talk to your doctor, nurse or pharmacist before following any medical regimen to see if it is safe and effective for you. SHEEBA Bocanegra  [facility]      BMI Counseling: Body mass index is 29.46 kg/m². The BMI is above normal. Nutrition recommendations include reducing fast food intake, decreasing soda and/or juice intake, moderation in carbohydrate intake and increasing intake of lean protein. Exercise recommendations include exercising 3-5 times per week.

## 2023-09-21 NOTE — ASSESSMENT & PLAN NOTE
Lipid panel 2022   Cholesterol 264, Triglycerides 198, LDL 42,     Lifestyle modification: Incorporate regular exercise, avoid sugars and simple carbohydrates, weight loss and choosing healthier fats.

## 2023-10-17 ENCOUNTER — HOSPITAL ENCOUNTER (OUTPATIENT)
Dept: MAMMOGRAPHY | Facility: MEDICAL CENTER | Age: 58
Discharge: HOME | End: 2023-10-17
Payer: COMMERCIAL

## 2023-10-17 VITALS — WEIGHT: 157 LBS | BODY MASS INDEX: 29.64 KG/M2 | HEIGHT: 61 IN

## 2023-10-17 DIAGNOSIS — Z12.31 ENCOUNTER FOR SCREENING MAMMOGRAM FOR BREAST CANCER: ICD-10-CM

## 2023-10-17 PROCEDURE — 77067 SCR MAMMO BI INCL CAD: CPT

## 2023-10-17 PROCEDURE — 77063 BREAST TOMOSYNTHESIS BI: CPT

## 2023-10-24 ENCOUNTER — PROCEDURE VISIT (OUTPATIENT)
Dept: FAMILY MEDICINE CLINIC | Facility: CLINIC | Age: 58
End: 2023-10-24
Payer: COMMERCIAL

## 2023-10-24 VITALS
DIASTOLIC BLOOD PRESSURE: 78 MMHG | HEIGHT: 61 IN | SYSTOLIC BLOOD PRESSURE: 118 MMHG | TEMPERATURE: 98.2 F | WEIGHT: 156.2 LBS | HEART RATE: 98 BPM | BODY MASS INDEX: 29.49 KG/M2 | OXYGEN SATURATION: 98 %

## 2023-10-24 DIAGNOSIS — Z01.419 ENCOUNTER FOR GYNECOLOGICAL EXAMINATION WITHOUT ABNORMAL FINDING: Primary | ICD-10-CM

## 2023-10-24 DIAGNOSIS — J31.0 CHRONIC RHINITIS: ICD-10-CM

## 2023-10-24 PROCEDURE — 99213 OFFICE O/P EST LOW 20 MIN: CPT

## 2023-10-24 PROCEDURE — G0145 SCR C/V CYTO,THINLAYER,RESCR: HCPCS

## 2023-10-24 PROCEDURE — S0612 ANNUAL GYNECOLOGICAL EXAMINA: HCPCS

## 2023-10-24 NOTE — PROGRESS NOTES
Alvin Muniz is a 59 y.o. female who presents for annual well woman exam. Postmenopausal. No intermenstrual bleeding, spotting, or discharge. GYN:  No vaginal discharge, labial erythema or lesions, dyspareunia. Menarche at 13. Patient is sexually active with 1 partner. Last pap smear was in 2018. Results were negative. No gynecologic surgeries. OB:   female   Pregnancies were full term. :  No dysuria, urinary frequency or urgency. no hematuria, flank pain, incontinence. Breast:   no breast mass, skin changes, dimpling, reddening, nipple retraction. no breast discharge. Patient does yes do monthly breast exams. Last mammogram was in . Results were abnormal.    Patient does no have a family history of breast, endometrial, or ovarian ca. General:  Diet: well controlled  Exercise: yes, walk, yoga  Work: [employer]  ETOH use: denies  Tobacco use: denies  Recreational drug use: denies    Screening:  Cervical cancer: last pap smear in . Results were Negative . Breast cancer: last mammogram in . Results were need further imaging. Colon cancer: last colonoscopy in . Results were negative. STD screening: declines. Review of Systems  Pertinent items are noted in HPI. Objective      /78 (BP Location: Left arm, Patient Position: Sitting, Cuff Size: Adult)   Pulse 98   Temp 98.2 °F (36.8 °C) (Temporal)   Ht 5' 1.25" (1.556 m)   Wt 70.9 kg (156 lb 3.2 oz)   SpO2 98%   BMI 29.27 kg/m²     Physical Exam  Vitals and nursing note reviewed. Constitutional:       General: She is not in acute distress. Appearance: Normal appearance. She is well-developed and normal weight. HENT:      Head: Normocephalic and atraumatic. Right Ear: Tympanic membrane, ear canal and external ear normal. There is no impacted cerumen. Left Ear: Tympanic membrane, ear canal and external ear normal. There is no impacted cerumen.       Nose: Nose normal.      Mouth/Throat:      Mouth: Mucous membranes are moist.      Pharynx: Oropharynx is clear. Eyes:      Extraocular Movements: Extraocular movements intact. Conjunctiva/sclera: Conjunctivae normal.      Pupils: Pupils are equal, round, and reactive to light. Cardiovascular:      Rate and Rhythm: Normal rate and regular rhythm. Pulses: Normal pulses. Heart sounds: Normal heart sounds. No murmur heard. Pulmonary:      Effort: Pulmonary effort is normal. No respiratory distress. Breath sounds: Normal breath sounds. Abdominal:      General: Bowel sounds are normal.      Palpations: Abdomen is soft. Tenderness: There is no abdominal tenderness. Musculoskeletal:         General: Normal range of motion. Cervical back: Normal range of motion and neck supple. Right lower leg: No edema. Left lower leg: No edema. Skin:     General: Skin is warm and dry. Capillary Refill: Capillary refill takes less than 2 seconds. Neurological:      General: No focal deficit present. Mental Status: She is alert and oriented to person, place, and time. Mental status is at baseline. Psychiatric:         Mood and Affect: Mood normal.         Behavior: Behavior normal.         Thought Content: Thought content normal.         Judgment: Judgment normal.                 Assessment     1. Chronic rhinitis  - Ambulatory Referral to Otolaryngology; Future     Plan     50-65 postmenopausal  1. Routine well woman exam done today. 2.  Pap and HPV:Pap with HPV was done today. Current ASCCP Guidelines reviewed. 3.  Mammogram ordered. Recommend yearly mammography. 4.  Colonoscopy recommended per guidelines. 5. The patient is sexually active. 6. The following were reviewed in today's visit: breast self exam, mammography screening ordered, menopause, and osteoporosis.   7. Patient to return to office in 12 months for annual.     Orion Pina, [address] [place] [address]  10/24/2023  1:55 PM

## 2023-10-30 LAB
LAB AP GYN PRIMARY INTERPRETATION: NORMAL
Lab: NORMAL

## 2023-11-21 DIAGNOSIS — R51.9 NONINTRACTABLE HEADACHE, UNSPECIFIED CHRONICITY PATTERN, UNSPECIFIED HEADACHE TYPE: ICD-10-CM

## 2023-11-21 DIAGNOSIS — K21.9 GASTROESOPHAGEAL REFLUX DISEASE WITHOUT ESOPHAGITIS: ICD-10-CM

## 2023-11-22 ENCOUNTER — VBI (OUTPATIENT)
Dept: ADMINISTRATIVE | Facility: OTHER | Age: 58
End: 2023-11-22

## 2023-11-22 RX ORDER — LEVOTHYROXINE SODIUM 0.03 MG/1
TABLET ORAL
Qty: 90 TABLET | Refills: 3 | Status: SHIPPED | OUTPATIENT
Start: 2023-11-22

## 2023-11-22 RX ORDER — OMEPRAZOLE 40 MG/1
CAPSULE, DELAYED RELEASE ORAL
Qty: 90 CAPSULE | Refills: 2 | Status: SHIPPED | OUTPATIENT
Start: 2023-11-22

## 2023-12-12 ENCOUNTER — HOSPITAL ENCOUNTER (OUTPATIENT)
Dept: ULTRASOUND IMAGING | Facility: CLINIC | Age: 58
Discharge: HOME | End: 2023-12-12
Payer: COMMERCIAL

## 2023-12-12 ENCOUNTER — HOSPITAL ENCOUNTER (OUTPATIENT)
Dept: MAMMOGRAPHY | Facility: CLINIC | Age: 58
Discharge: HOME | End: 2023-12-12
Payer: COMMERCIAL

## 2023-12-12 VITALS — WEIGHT: 156 LBS | BODY MASS INDEX: 29.45 KG/M2 | HEIGHT: 61 IN

## 2023-12-12 DIAGNOSIS — R92.8 ABNORMAL SCREENING MAMMOGRAM: ICD-10-CM

## 2023-12-12 PROCEDURE — G0279 TOMOSYNTHESIS, MAMMO: HCPCS

## 2023-12-12 PROCEDURE — 77065 DX MAMMO INCL CAD UNI: CPT

## 2023-12-12 PROCEDURE — 76642 ULTRASOUND BREAST LIMITED: CPT

## 2023-12-16 ENCOUNTER — HOSPITAL ENCOUNTER (OUTPATIENT)
Dept: CT IMAGING | Facility: HOSPITAL | Age: 58
Discharge: HOME | End: 2023-12-16
Payer: COMMERCIAL

## 2023-12-16 DIAGNOSIS — J34.89 SINUS PRESSURE: ICD-10-CM

## 2023-12-16 DIAGNOSIS — R09.81 NASAL CONGESTION: ICD-10-CM

## 2023-12-16 DIAGNOSIS — J34.3 NASAL TURBINATE HYPERTROPHY: ICD-10-CM

## 2023-12-16 DIAGNOSIS — J34.2 DEVIATED NASAL SEPTUM: ICD-10-CM

## 2023-12-16 PROCEDURE — 70486 CT MAXILLOFACIAL W/O DYE: CPT

## 2023-12-16 PROCEDURE — G1004 CDSM NDSC: HCPCS

## 2024-02-28 ENCOUNTER — OFFICE VISIT (OUTPATIENT)
Dept: FAMILY MEDICINE CLINIC | Facility: CLINIC | Age: 59
End: 2024-02-28
Payer: COMMERCIAL

## 2024-02-28 ENCOUNTER — TELEPHONE (OUTPATIENT)
Age: 59
End: 2024-02-28

## 2024-02-28 VITALS
BODY MASS INDEX: 28.97 KG/M2 | HEART RATE: 80 BPM | TEMPERATURE: 98.3 F | DIASTOLIC BLOOD PRESSURE: 90 MMHG | OXYGEN SATURATION: 97 % | SYSTOLIC BLOOD PRESSURE: 140 MMHG | HEIGHT: 62 IN | RESPIRATION RATE: 18 BRPM | WEIGHT: 157.4 LBS

## 2024-02-28 DIAGNOSIS — E78.49 OTHER HYPERLIPIDEMIA: Chronic | ICD-10-CM

## 2024-02-28 DIAGNOSIS — E03.9 ACQUIRED HYPOTHYROIDISM: ICD-10-CM

## 2024-02-28 DIAGNOSIS — R03.0 ELEVATED BP WITHOUT DIAGNOSIS OF HYPERTENSION: ICD-10-CM

## 2024-02-28 DIAGNOSIS — J31.0 CHRONIC RHINITIS: ICD-10-CM

## 2024-02-28 DIAGNOSIS — R73.9 HYPERGLYCEMIA: Chronic | ICD-10-CM

## 2024-02-28 DIAGNOSIS — J01.01 ACUTE RECURRENT MAXILLARY SINUSITIS: Primary | ICD-10-CM

## 2024-02-28 PROCEDURE — 99214 OFFICE O/P EST MOD 30 MIN: CPT

## 2024-02-28 RX ORDER — AMOXICILLIN 500 MG/1
500 CAPSULE ORAL EVERY 12 HOURS SCHEDULED
Qty: 20 CAPSULE | Refills: 0 | Status: SHIPPED | OUTPATIENT
Start: 2024-02-28 | End: 2024-03-09

## 2024-02-28 NOTE — TELEPHONE ENCOUNTER
Patient called and was looking to get in to the office today.  She think her BP is high.  Stated she feels warm too.  I called the office for an over book, but everyone was helping other patients.  Please call Marie @ [phone]

## 2024-02-28 NOTE — ASSESSMENT & PLAN NOTE
Recent illness  Now presents with 7 days of head congestion and sinus pressure   Antibiotics prescribed    Patient is to follow up in 10 days after antibiotics for BP check   140's-160's on home BP cuff   Patient is to check BP at home   If still elevated we will start medication

## 2024-02-28 NOTE — ASSESSMENT & PLAN NOTE
Lipid panel 2022   Cholesterol 264, Triglycerides 198, LDL 42,   Repeat lipid panel ordered     Lifestyle modification: Incorporate regular exercise, avoid sugars and simple carbohydrates, weight loss and choosing healthier fats.

## 2024-02-28 NOTE — PROGRESS NOTES
Name: Marie Lloyd      : 1965      MRN: [id number]  Encounter Provider: SHEEBA Poole  Encounter Date: 2024   Encounter department: [facility]    Assessment & Plan     1. Acute recurrent maxillary sinusitis  -     amoxicillin (AMOXIL) 500 mg capsule; Take 1 capsule (500 mg total) by mouth every 12 (twelve) hours for 10 days    2. Chronic rhinitis  Assessment & Plan:  Previously evaluated by ENT and Allergy   2024 CT sinus  Mild paranasal sinus mucosal thickening. There is a small rounded area of mucosal thickening or small retention cyst at the level of the ostium of the ostiomeatal complex on the left partially obstructing the complex.    Current medication: atrovent/azelastine nasal sprays.         3. Acquired hypothyroidism  Assessment & Plan:   TSH 3.78   Repeat TSH ordered   Current medications: Levothyroxine 25mcg     Orders:  -     CBC and differential; Future  -     TSH, 3rd generation with Free T4 reflex; Future    4. Other hyperlipidemia  Assessment & Plan:  Lipid panel    Cholesterol 264, Triglycerides 198, LDL 42,   Repeat lipid panel ordered     Lifestyle modification: Incorporate regular exercise, avoid sugars and simple carbohydrates, weight loss and choosing healthier fats.     Orders:  -     CBC and differential; Future  -     Lipid panel; Future    5. Hyperglycemia  Assessment & Plan:  2022 A1C 5.3   2021 Blood glucose 109      Orders:  -     CBC and differential; Future  -     Comprehensive metabolic panel; Future  -     Hemoglobin A1C; Future    6. Elevated BP without diagnosis of hypertension  Assessment & Plan:  Recent illness  Now presents with 7 days of head congestion and sinus pressure   Antibiotics prescribed    Patient is to follow up in 10 days after antibiotics for BP check   140's-160's on home BP cuff   Patient is to check BP at home   If still elevated we will start medication               Subjective       Hypertension  This is a new problem. The current episode started in the past 7 days. The problem has been gradually worsening since onset. The problem is uncontrolled. Associated symptoms include headaches. Pertinent negatives include no anxiety, blurred vision, chest pain, malaise/fatigue, neck pain, orthopnea, palpitations, peripheral edema, PND, shortness of breath or sweats. Risk factors for coronary artery disease include post-menopausal state, obesity and sedentary lifestyle. Past treatments include nothing. The current treatment provides no improvement.     Review of Systems   Constitutional:  Positive for diaphoresis. Negative for activity change, chills, fatigue, fever and malaise/fatigue.   HENT:  Positive for postnasal drip, rhinorrhea, sinus pressure and sinus pain. Negative for congestion, ear pain, sore throat and trouble swallowing.    Eyes:  Negative for blurred vision, pain and visual disturbance.   Respiratory:  Negative for cough, chest tightness and shortness of breath.    Cardiovascular:  Negative for chest pain, palpitations, orthopnea, leg swelling and PND.   Gastrointestinal:  Negative for abdominal pain, constipation, diarrhea, nausea and vomiting.   Genitourinary:  Negative for difficulty urinating, dysuria, hematuria and urgency.   Musculoskeletal:  Negative for arthralgias, back pain and neck pain.   Skin:  Negative for color change and rash.   Neurological:  Positive for headaches. Negative for dizziness, seizures and syncope.   Psychiatric/Behavioral:  Negative for dysphoric mood. The patient is not nervous/anxious.    All other systems reviewed and are negative.      Current Outpatient Medications on File Prior to Visit   Medication Sig    Azelastine HCl 137 MCG/SPRAY SOLN 1-2 SPRAYS INTO EACH NOSTRIL 2 (TWO) TIMES A DAY AS NEEDED FOR RHINITIS USE IN EACH NOSTRIL AS DIRECTED    levothyroxine 25 mcg tablet TAKE 1 TABLET BY MOUTH EVERY DAY    omeprazole (PriLOSEC) 40 MG capsule TAKE 1  "CAPSULE BY MOUTH EVERY DAY    fexofenadine (ALLEGRA) 180 MG tablet Take 180 mg by mouth daily (Patient not taking: Reported on 2/28/2024)    fluticasone (FLONASE) 50 mcg/act nasal spray 1 spray into each nostril 2 (two) times a day (Patient not taking: Reported on 2/28/2024)    ipratropium (ATROVENT) 0.06 % nasal spray 2 sprays into each nostril 4 (four) times a day (Patient not taking: Reported on 2/28/2024)       Objective     /90 (BP Location: Left arm, Patient Position: Sitting, Cuff Size: Standard)   Pulse 80   Temp 98.3 °F (36.8 °C) (Temporal)   Resp 18   Ht 5' 2\" (1.575 m)   Wt 71.4 kg (157 lb 6.4 oz)   SpO2 97%   BMI 28.79 kg/m²     Physical Exam  Vitals and nursing note reviewed.   Constitutional:       Appearance: Normal appearance. She is normal weight.   HENT:      Head: Normocephalic.      Right Ear: Tympanic membrane, ear canal and external ear normal. There is no impacted cerumen.      Left Ear: Tympanic membrane, ear canal and external ear normal. There is no impacted cerumen.      Nose: Nose normal.      Mouth/Throat:      Mouth: Mucous membranes are moist.      Pharynx: Oropharynx is clear.   Eyes:      Extraocular Movements: Extraocular movements intact.      Conjunctiva/sclera: Conjunctivae normal.      Pupils: Pupils are equal, round, and reactive to light.   Cardiovascular:      Rate and Rhythm: Normal rate and regular rhythm.      Pulses: Normal pulses.      Heart sounds: Normal heart sounds.   Pulmonary:      Effort: Pulmonary effort is normal.      Breath sounds: Normal breath sounds.   Abdominal:      General: Bowel sounds are normal. There is no distension.      Palpations: Abdomen is soft.      Tenderness: There is no abdominal tenderness.   Musculoskeletal:         General: Normal range of motion.      Cervical back: Normal range of motion.   Skin:     General: Skin is warm.      Capillary Refill: Capillary refill takes less than 2 seconds.   Neurological:      General: No " focal deficit present.      Mental Status: She is alert and oriented to person, place, and time. Mental status is at baseline.   Psychiatric:         Mood and Affect: Mood normal.         Behavior: Behavior normal.         Thought Content: Thought content normal.         Judgment: Judgment normal.       SHEEBA Poole

## 2024-02-28 NOTE — ASSESSMENT & PLAN NOTE
Previously evaluated by ENT and Allergy   01/2024 CT sinus  Mild paranasal sinus mucosal thickening. There is a small rounded area of mucosal thickening or small retention cyst at the level of the ostium of the ostiomeatal complex on the left partially obstructing the complex.    Current medication: atrovent/azelastine nasal sprays.

## 2024-03-01 ENCOUNTER — TELEPHONE (OUTPATIENT)
Age: 59
End: 2024-03-01

## 2024-03-01 ENCOUNTER — HOSPITAL ENCOUNTER (EMERGENCY)
Facility: HOSPITAL | Age: 59
Discharge: HOME | End: 2024-03-01
Attending: EMERGENCY MEDICINE
Payer: COMMERCIAL

## 2024-03-01 VITALS
TEMPERATURE: 97.9 F | SYSTOLIC BLOOD PRESSURE: 144 MMHG | HEART RATE: 71 BPM | OXYGEN SATURATION: 96 % | RESPIRATION RATE: 20 BRPM | DIASTOLIC BLOOD PRESSURE: 67 MMHG

## 2024-03-01 DIAGNOSIS — I10 HIGH BLOOD PRESSURE: Primary | ICD-10-CM

## 2024-03-01 DIAGNOSIS — R79.89 ELEVATED TSH: ICD-10-CM

## 2024-03-01 LAB
ALBUMIN SERPL BCP-MCNC: 4.1 G/DL (ref 3.5–5)
ALP SERPL-CCNC: 84 U/L (ref 34–104)
ALT SERPL W P-5'-P-CCNC: 25 U/L (ref 7–52)
ANION GAP SERPL CALCULATED.3IONS-SCNC: 6 MMOL/L
AST SERPL W P-5'-P-CCNC: 17 U/L (ref 13–39)
ATRIAL RATE: 74 BPM
BASOPHILS # BLD AUTO: 0.04 THOUSANDS/ÂΜL (ref 0–0.1)
BASOPHILS NFR BLD AUTO: 1 % (ref 0–1)
BILIRUB SERPL-MCNC: 0.55 MG/DL (ref 0.2–1)
BUN SERPL-MCNC: 20 MG/DL (ref 5–25)
CALCIUM SERPL-MCNC: 9 MG/DL (ref 8.4–10.2)
CARDIAC TROPONIN I PNL SERPL HS: 3 NG/L
CHLORIDE SERPL-SCNC: 106 MMOL/L (ref 96–108)
CO2 SERPL-SCNC: 27 MMOL/L (ref 21–32)
CREAT SERPL-MCNC: 0.67 MG/DL (ref 0.6–1.3)
EOSINOPHIL # BLD AUTO: 0.59 THOUSAND/ÂΜL (ref 0–0.61)
EOSINOPHIL NFR BLD AUTO: 8 % (ref 0–6)
ERYTHROCYTE [DISTWIDTH] IN BLOOD BY AUTOMATED COUNT: 13.5 % (ref 11.6–15.1)
GFR SERPL CREATININE-BSD FRML MDRD: 95 ML/MIN/1.73SQ M
GLUCOSE SERPL-MCNC: 99 MG/DL (ref 65–140)
HCT VFR BLD AUTO: 40 % (ref 34.8–46.1)
HGB BLD-MCNC: 13.5 G/DL (ref 11.5–15.4)
IMM GRANULOCYTES # BLD AUTO: 0.02 THOUSAND/UL (ref 0–0.2)
IMM GRANULOCYTES NFR BLD AUTO: 0 % (ref 0–2)
LIPASE SERPL-CCNC: 27 U/L (ref 11–82)
LYMPHOCYTES # BLD AUTO: 2.83 THOUSANDS/ÂΜL (ref 0.6–4.47)
LYMPHOCYTES NFR BLD AUTO: 40 % (ref 14–44)
MCH RBC QN AUTO: 29.5 PG (ref 26.8–34.3)
MCHC RBC AUTO-ENTMCNC: 33.8 G/DL (ref 31.4–37.4)
MCV RBC AUTO: 87 FL (ref 82–98)
MONOCYTES # BLD AUTO: 0.42 THOUSAND/ÂΜL (ref 0.17–1.22)
MONOCYTES NFR BLD AUTO: 6 % (ref 4–12)
NEUTROPHILS # BLD AUTO: 3.24 THOUSANDS/ÂΜL (ref 1.85–7.62)
NEUTS SEG NFR BLD AUTO: 45 % (ref 43–75)
NRBC BLD AUTO-RTO: 0 /100 WBCS
P AXIS: 43 DEGREES
PLATELET # BLD AUTO: 269 THOUSANDS/UL (ref 149–390)
PMV BLD AUTO: 10.4 FL (ref 8.9–12.7)
POTASSIUM SERPL-SCNC: 3.9 MMOL/L (ref 3.5–5.3)
PR INTERVAL: 180 MS
PROT SERPL-MCNC: 7.1 G/DL (ref 6.4–8.4)
QRS AXIS: -23 DEGREES
QRSD INTERVAL: 96 MS
QT INTERVAL: 410 MS
QTC INTERVAL: 455 MS
RBC # BLD AUTO: 4.58 MILLION/UL (ref 3.81–5.12)
SODIUM SERPL-SCNC: 139 MMOL/L (ref 135–147)
T WAVE AXIS: 33 DEGREES
T4 FREE SERPL-MCNC: 0.74 NG/DL (ref 0.61–1.12)
TSH SERPL DL<=0.05 MIU/L-ACNC: 4.94 UIU/ML (ref 0.45–4.5)
VENTRICULAR RATE: 74 BPM
WBC # BLD AUTO: 7.14 THOUSAND/UL (ref 4.31–10.16)

## 2024-03-01 PROCEDURE — 99285 EMERGENCY DEPT VISIT HI MDM: CPT

## 2024-03-01 PROCEDURE — 93005 ELECTROCARDIOGRAM TRACING: CPT

## 2024-03-01 PROCEDURE — 85025 COMPLETE CBC W/AUTO DIFF WBC: CPT

## 2024-03-01 PROCEDURE — 84439 ASSAY OF FREE THYROXINE: CPT

## 2024-03-01 PROCEDURE — 99283 EMERGENCY DEPT VISIT LOW MDM: CPT

## 2024-03-01 PROCEDURE — 80053 COMPREHEN METABOLIC PANEL: CPT

## 2024-03-01 PROCEDURE — 83690 ASSAY OF LIPASE: CPT

## 2024-03-01 PROCEDURE — 93010 ELECTROCARDIOGRAM REPORT: CPT

## 2024-03-01 PROCEDURE — 36415 COLL VENOUS BLD VENIPUNCTURE: CPT

## 2024-03-01 PROCEDURE — 84484 ASSAY OF TROPONIN QUANT: CPT

## 2024-03-01 PROCEDURE — 84443 ASSAY THYROID STIM HORMONE: CPT

## 2024-03-01 NOTE — ED PROVIDER NOTES
"History  Chief Complaint   Patient presents with    Hypertension     Pt reports HTN in the 180s at home, no hx, pt only complains of \"feeling warm\"     The patient is a 58-year-old female with history of GERD, elevated blood pressure without diagnosis of hypertension, sinusitis who presents to the ED for evaluation of headache, epigastric/chest discomfort, and elevated blood pressure.  She reports she woke from sleep at 1 AM with these symptoms and felt cold, prompting her to check her blood pressure.  It is reportedly been between 180-190 systolic, with a diastolic .  She reports associated nausea.  She otherwise denies fever, dyspnea, leg swelling, vision changes, numbness, paresthesia, vomiting, dizziness, focal weakness, back pain.        Prior to Admission Medications   Prescriptions Last Dose Informant Patient Reported? Taking?   Azelastine HCl 137 MCG/SPRAY SOLN Past Week  No Yes   Si-2 SPRAYS INTO EACH NOSTRIL 2 (TWO) TIMES A DAY AS NEEDED FOR RHINITIS USE IN EACH NOSTRIL AS DIRECTED   amoxicillin (AMOXIL) 500 mg capsule   No No   Sig: Take 1 capsule (500 mg total) by mouth every 12 (twelve) hours for 10 days   fexofenadine (ALLEGRA) 180 MG tablet Not Taking Self Yes No   Sig: Take 180 mg by mouth daily   Patient not taking: Reported on 2024   fluticasone (FLONASE) 50 mcg/act nasal spray Past Week  No Yes   Si spray into each nostril 2 (two) times a day   ipratropium (ATROVENT) 0.06 % nasal spray Not Taking  No No   Si sprays into each nostril 4 (four) times a day   Patient not taking: Reported on 2024   levothyroxine 25 mcg tablet 3/1/2024  No Yes   Sig: TAKE 1 TABLET BY MOUTH EVERY DAY   omeprazole (PriLOSEC) 40 MG capsule   No No   Sig: TAKE 1 CAPSULE BY MOUTH EVERY DAY      Facility-Administered Medications: None       Past Medical History:   Diagnosis Date    Allergic rhinitis 2014    COVID-19 2020    COVID-19 virus infection 2020    Sx began on 12/10/20: " cough, congestion, CP and chills. Covid positive at [facility] on 12/14/20. CXR showed bibasilar infiltrates.  with similar Sx, seen and tested at [facility] on 12/18/20 and positive. Telemedicine on 12/23/2020-she has a whole lot better and return to work as of 12/28/2020.    Disease of thyroid gland     Elevated blood-pressure reading without diagnosis of hypertension 4/29/2016    Gastroesophageal reflux disease without esophagitis 9/18/2019    H. pylori infection     Microscopic hematuria 5/9/2022    Minimal X1 ; U/S ok  Subsequent U/A ok     Seasonal allergies     Viral infection, unspecified 1/4/2022       Past Surgical History:   Procedure Laterality Date    COLONOSCOPY N/A 2/22/2016    Procedure: COLONOSCOPY;  Surgeon: Pepe Simon MD;  Location: [facility];  Service:        Family History   Problem Relation Age of Onset    Diabetes Mother     Alzheimer's disease Mother 72    Throat cancer Father 78    No Known Problems Sister     No Known Problems Sister     No Known Problems Daughter     No Known Problems Daughter     No Known Problems Maternal Grandmother     No Known Problems Maternal Grandfather     No Known Problems Paternal Grandmother     No Known Problems Paternal Grandfather     Diabetes Brother     No Known Problems Maternal Aunt     No Known Problems Maternal Aunt     No Known Problems Maternal Aunt     No Known Problems Maternal Aunt     No Known Problems Paternal Aunt     No Known Problems Paternal Aunt     No Known Problems Paternal Aunt      I have reviewed and agree with the history as documented.    E-Cigarette/Vaping    E-Cigarette Use Never User      E-Cigarette/Vaping Substances    Nicotine No     THC No     CBD No     Flavoring No     Other No     Unknown No      Social History     Tobacco Use    Smoking status: Never    Smokeless tobacco: Never    Tobacco comments:     no exposure to passive smoke   Vaping Use    Vaping status: Never Used   Substance Use Topics    Alcohol use:  No    Drug use: No       Review of Systems   Constitutional:  Negative for chills and fever.   HENT:  Negative for congestion and rhinorrhea.    Respiratory:  Negative for cough and shortness of breath.    Cardiovascular:  Positive for chest pain. Negative for leg swelling.   Gastrointestinal:  Positive for abdominal pain (epigastric) and nausea. Negative for constipation, diarrhea and vomiting.   Genitourinary:  Negative for dysuria and flank pain.   Musculoskeletal:  Negative for arthralgias and myalgias.   Skin:  Negative for rash and wound.   Neurological:  Negative for dizziness, weakness, numbness and headaches.   Psychiatric/Behavioral:  Negative for behavioral problems.        Physical Exam  Physical Exam  Vitals and nursing note reviewed.   Constitutional:       General: She is not in acute distress.     Appearance: She is well-developed.   HENT:      Head: Normocephalic and atraumatic.   Eyes:      Conjunctiva/sclera: Conjunctivae normal.   Cardiovascular:      Rate and Rhythm: Normal rate and regular rhythm.      Heart sounds: No murmur heard.  Pulmonary:      Effort: Pulmonary effort is normal. No respiratory distress.      Breath sounds: Normal breath sounds.   Abdominal:      Palpations: Abdomen is soft.      Tenderness: There is no abdominal tenderness. There is no guarding or rebound.   Musculoskeletal:         General: No swelling.      Cervical back: Neck supple.      Right lower leg: No edema.      Left lower leg: No edema.   Skin:     General: Skin is warm and dry.      Capillary Refill: Capillary refill takes less than 2 seconds.   Neurological:      Mental Status: She is alert.      GCS: GCS eye subscore is 4. GCS verbal subscore is 5. GCS motor subscore is 6.      Cranial Nerves: Cranial nerves 2-12 are intact. No facial asymmetry.      Sensory: Sensation is intact.      Motor: Motor function is intact. No pronator drift.   Psychiatric:         Mood and Affect: Mood normal.         Vital  Signs  ED Triage Vitals   Temperature Pulse Respirations Blood Pressure SpO2   03/01/24 0326 03/01/24 0324 03/01/24 0324 03/01/24 0326 03/01/24 0324   97.9 °F (36.6 °C) 80 17 (!) 184/108 100 %      Temp src Heart Rate Source Patient Position - Orthostatic VS BP Location FiO2 (%)   -- 03/01/24 0324 03/01/24 0352 03/01/24 0352 --    Monitor Lying Right arm       Pain Score       03/01/24 0355       6           Vitals:    03/01/24 0326 03/01/24 0352 03/01/24 0400 03/01/24 0447   BP: (!) 184/108 131/98 131/98 144/67   Pulse:   71    Patient Position - Orthostatic VS:  Lying Lying Lying         Visual Acuity      ED Medications  Medications - No data to display    Diagnostic Studies  Results Reviewed       Procedure Component Value Units Date/Time    TSH, 3rd generation with Free T4 reflex [[id number]]  (Abnormal) Collected: 03/01/24 0347    Lab Status: Final result Specimen: Blood from Arm, Right Updated: 03/01/24 0429     TSH 3RD GENERATON 4.937 uIU/mL     T4, free [[id number]] Collected: 03/01/24 0347    Lab Status: In process Specimen: Blood from Arm, Right Updated: 03/01/24 0429    HS Troponin 0hr (reflex protocol) [[id number]]  (Normal) Collected: 03/01/24 0347    Lab Status: Final result Specimen: Blood from Arm, Right Updated: 03/01/24 0420     hs TnI 0hr 3 ng/L     Comprehensive metabolic panel [[id number]] Collected: 03/01/24 0347    Lab Status: Final result Specimen: Blood from Arm, Right Updated: 03/01/24 0413     Sodium 139 mmol/L      Potassium 3.9 mmol/L      Chloride 106 mmol/L      CO2 27 mmol/L      ANION GAP 6 mmol/L      BUN 20 mg/dL      Creatinine 0.67 mg/dL      Glucose 99 mg/dL      Calcium 9.0 mg/dL      AST 17 U/L      ALT 25 U/L      Alkaline Phosphatase 84 U/L      Total Protein 7.1 g/dL      Albumin 4.1 g/dL      Total Bilirubin 0.55 mg/dL      eGFR 95 ml/min/1.73sq m     Narrative:      National Kidney Disease Foundation guidelines for Chronic Kidney Disease (CKD):     Stage 1 with normal or  high GFR (GFR > 90 mL/min/1.73 square meters)    Stage 2 Mild CKD (GFR = 60-89 mL/min/1.73 square meters)    Stage 3A Moderate CKD (GFR = 45-59 mL/min/1.73 square meters)    Stage 3B Moderate CKD (GFR = 30-44 mL/min/1.73 square meters)    Stage 4 Severe CKD (GFR = 15-29 mL/min/1.73 square meters)    Stage 5 End Stage CKD (GFR <15 mL/min/1.73 square meters)  Note: GFR calculation is accurate only with a steady state creatinine    Lipase [[id number]]  (Normal) Collected: 03/01/24 0347    Lab Status: Final result Specimen: Blood from Arm, Right Updated: 03/01/24 0413     Lipase 27 u/L     CBC and differential [[id number]]  (Abnormal) Collected: 03/01/24 0347    Lab Status: Final result Specimen: Blood from Arm, Right Updated: 03/01/24 0358     WBC 7.14 Thousand/uL      RBC 4.58 Million/uL      Hemoglobin 13.5 g/dL      Hematocrit 40.0 %      MCV 87 fL      MCH 29.5 pg      MCHC 33.8 g/dL      RDW 13.5 %      MPV 10.4 fL      Platelets 269 Thousands/uL      nRBC 0 /100 WBCs      Neutrophils Relative 45 %      Immat GRANS % 0 %      Lymphocytes Relative 40 %      Monocytes Relative 6 %      Eosinophils Relative 8 %      Basophils Relative 1 %      Neutrophils Absolute 3.24 Thousands/µL      Immature Grans Absolute 0.02 Thousand/uL      Lymphocytes Absolute 2.83 Thousands/µL      Monocytes Absolute 0.42 Thousand/µL      Eosinophils Absolute 0.59 Thousand/µL      Basophils Absolute 0.04 Thousands/µL                    No orders to display              Procedures  Procedures         ED Course  ED Course as of 03/01/24 0533   Fri Mar 01, 2024   0347 EKG normal sinus rhythm at 74 bpm LAD, , QTc 455, no ST elevation or depression as interpreted by me   0438 Blood Pressure: 131/98         SBIRT 22yo+      Flowsheet Row Most Recent Value   Initial Alcohol Screen: US AUDIT-C     1. How often do you have a drink containing alcohol? 0 Filed at: 03/01/2024 0334   2. How many drinks containing alcohol do you have on a typical day  you are drinking?  0 Filed at: 03/01/2024 0334   3a. Male UNDER 65: How often do you have five or more drinks on one occasion? 0 Filed at: 03/01/2024 0334   3b. FEMALE Any Age, or MALE 65+: How often do you have 4 or more drinks on one occassion? 0 Filed at: 03/01/2024 0334   Audit-C Score 0 Filed at: 03/01/2024 0334   [place]: How many times in the past year have you...    Used an illegal drug or used a prescription medication for non-medical reasons? Never Filed at: 03/01/2024 0334            Medical Decision Making  Differential diagnosis including but not limited to: hypertension, hypertensive urgency, hypertensive crisis, end organ damage, metabolic abnormality, ACS, MI; doubt intracranial process    Will obtain EKG, troponin to evaluate for ACS.  Will obtain CBC to evaluate for leukocytosis, anemia.  Will obtain CMP to evaluate kidney function, for electrolyte disturbance. Will obtain lipase to evaluate for pancreatitis.      HEART score 2.  Troponin within normal limits.  Labs without evidence of endorgan damage.  Discussed with patient elevated TSH, advise close PCP follow-up.  Patient verbalized understanding and agreement.  EKG without evidence of ischemia.  Patient's hypertension has significantly improved in ED.  Patient reports resolution of symptoms.  Remains without neurologic deficit on exam.  Will discharge    At the time of discharge, the patient is in no acute distress. I discussed with the patient the diagnosis, treatment plan, follow-up, return precautions, and discharge instructions; they were given the opportunity to ask questions and verbalized understanding.    Problems Addressed:  Elevated TSH: acute illness or injury  High blood pressure: acute illness or injury    Amount and/or Complexity of Data Reviewed  External Data Reviewed: labs, radiology, ECG and notes.  Labs: ordered. Decision-making details documented in ED Course.  ECG/medicine tests: ordered and independent interpretation  performed. Decision-making details documented in ED Course.             Disposition  Final diagnoses:   High blood pressure   Elevated TSH     Time reflects when diagnosis was documented in both MDM as applicable and the Disposition within this note       Time User Action Codes Description Comment    3/1/2024  4:45 AM Sol Do [I10] High blood pressure     3/1/2024  4:48 AM Sol Do [R79.89] Elevated TSH           ED Disposition       ED Disposition   Discharge    Condition   Stable    Date/Time   Fri Mar 1, 2024 0443    Comment   Marie Lloyd discharge to home/self care.                   Follow-up Information       Follow up With Specialties Details Why Contact Info    Abdulaziz Yadav MD Family Medicine   [address] [place] [address]  [phone]              Discharge Medication List as of 3/1/2024  4:47 AM        CONTINUE these medications which have NOT CHANGED    Details   Azelastine HCl 137 MCG/SPRAY SOLN 1-2 SPRAYS INTO EACH NOSTRIL 2 (TWO) TIMES A DAY AS NEEDED FOR RHINITIS USE IN EACH NOSTRIL AS DIRECTED, Normal      fluticasone (FLONASE) 50 mcg/act nasal spray 1 spray into each nostril 2 (two) times a day, Starting Wed 11/15/2023, Normal      levothyroxine 25 mcg tablet TAKE 1 TABLET BY MOUTH EVERY DAY, Normal      amoxicillin (AMOXIL) 500 mg capsule Take 1 capsule (500 mg total) by mouth every 12 (twelve) hours for 10 days, Starting Wed 2/28/2024, Until Sat 3/9/2024, Normal      fexofenadine (ALLEGRA) 180 MG tablet Take 180 mg by mouth daily, Historical Med      ipratropium (ATROVENT) 0.06 % nasal spray 2 sprays into each nostril 4 (four) times a day, Starting Tue 1/16/2024, Normal      omeprazole (PriLOSEC) 40 MG capsule TAKE 1 CAPSULE BY MOUTH EVERY DAY, Normal             No discharge procedures on file.    PDMP Review       None            ED Provider  Electronically Signed by             Sol Do PA-C  03/01/24 0506

## 2024-03-01 NOTE — DISCHARGE INSTRUCTIONS
Follow up with your PCP - call today to schedule follow up appointment    Return to the ER for chest pain, trouble breathing, leg swelling, coughing up blood, numbness, vision changes, or any other new/concerning symptoms     Check your blood pressure once every day and record. Check at the same time every day. Make sure to rest 15 minutes prior to checking, in a seated position.

## 2024-03-04 ENCOUNTER — RA CDI HCC (OUTPATIENT)
Dept: OTHER | Facility: HOSPITAL | Age: 59
End: 2024-03-04

## 2024-03-06 ENCOUNTER — OFFICE VISIT (OUTPATIENT)
Dept: FAMILY MEDICINE CLINIC | Facility: CLINIC | Age: 59
End: 2024-03-06
Payer: COMMERCIAL

## 2024-03-06 VITALS
SYSTOLIC BLOOD PRESSURE: 110 MMHG | TEMPERATURE: 97.9 F | HEART RATE: 89 BPM | OXYGEN SATURATION: 98 % | DIASTOLIC BLOOD PRESSURE: 72 MMHG | BODY MASS INDEX: 28.89 KG/M2 | HEIGHT: 62 IN | WEIGHT: 157 LBS

## 2024-03-06 DIAGNOSIS — R03.0 ELEVATED BP WITHOUT DIAGNOSIS OF HYPERTENSION: Primary | ICD-10-CM

## 2024-03-06 DIAGNOSIS — R09.81 NASAL CONGESTION: ICD-10-CM

## 2024-03-06 DIAGNOSIS — J31.0 CHRONIC RHINITIS: ICD-10-CM

## 2024-03-06 DIAGNOSIS — E03.9 ACQUIRED HYPOTHYROIDISM: ICD-10-CM

## 2024-03-06 DIAGNOSIS — R51.9 NONINTRACTABLE HEADACHE, UNSPECIFIED CHRONICITY PATTERN, UNSPECIFIED HEADACHE TYPE: ICD-10-CM

## 2024-03-06 PROCEDURE — 99214 OFFICE O/P EST MOD 30 MIN: CPT

## 2024-03-06 RX ORDER — LEVOTHYROXINE SODIUM 0.05 MG/1
50 TABLET ORAL DAILY
Qty: 90 TABLET | Refills: 0 | Status: SHIPPED | OUTPATIENT
Start: 2024-03-06

## 2024-03-06 RX ORDER — FLUTICASONE PROPIONATE 50 MCG
1 SPRAY, SUSPENSION (ML) NASAL 2 TIMES DAILY
Qty: 16 G | Refills: 3 | Status: SHIPPED | OUTPATIENT
Start: 2024-03-06

## 2024-03-06 RX ORDER — IPRATROPIUM BROMIDE 42 UG/1
2 SPRAY, METERED NASAL 4 TIMES DAILY
Qty: 15 ML | Refills: 5 | Status: SHIPPED | OUTPATIENT
Start: 2024-03-06

## 2024-03-06 NOTE — ASSESSMENT & PLAN NOTE
Recent illness  7 days of head congestion and sinus pressure- Amoxicillin   Patient was having elevated blood pressures during antibiotic therapy   Presented to the ED on 03/01 for elevated /108  Workup negative   Patient kept a blood pressure log for 10 days to monitor   Stable numbers except on 03/01 which prompted ER visit   Will not start medication at this time.   Patient will continue to take blood pressure daily.   If blood pressure is consistently above 150/90 we will start medication

## 2024-03-06 NOTE — PROGRESS NOTES
Name: Marie Lloyd      : 1965      MRN: [id number]  Encounter Provider: SHEEBA Poole  Encounter Date: 3/6/2024   Encounter department: [facility]    Assessment & Plan     1. Elevated BP without diagnosis of hypertension  Assessment & Plan:  Recent illness  7 days of head congestion and sinus pressure- Amoxicillin   Patient was having elevated blood pressures during antibiotic therapy   Presented to the ED on  for elevated /108  Workup negative   Patient kept a blood pressure log for 10 days to monitor   Stable numbers except on  which prompted ER visit   Will not start medication at this time.   Patient will continue to take blood pressure daily.   If blood pressure is consistently above 150/90 we will start medication         2. Acquired hypothyroidism  Assessment & Plan:   TSH 3.78   Repeat TSH ordered     TSH 4.937 T4 0.74  Current medications: Levothyroxine 25mcg   Patient is having mild symptoms related to hypothyroidism  New medications: Levothyroxine 50mcg   Repeat TSH ordered in 3 months and will discuss at follow up appointment     Orders:  -     TSH, 3rd generation with Free T4 reflex; Future; Expected date: 2024    3. Nonintractable headache, unspecified chronicity pattern, unspecified headache type  -     levothyroxine 50 mcg tablet; Take 1 tablet (50 mcg total) by mouth daily  -     Ambulatory Referral to Sleep Medicine; Future    4. Nasal congestion  -     Ambulatory Referral to Sleep Medicine; Future  -     fluticasone (FLONASE) 50 mcg/act nasal spray; 1 spray into each nostril 2 (two) times a day    5. Chronic rhinitis  Assessment & Plan:  Previously evaluated by ENT and Allergy   2024 CT sinus  Mild paranasal sinus mucosal thickening. There is a small rounded area of mucosal thickening or small retention cyst at the level of the ostium of the ostiomeatal complex on the left partially obstructing the  complex.    Current medication: atrovent/azelastine nasal sprays.       Orders:  -     fluticasone (FLONASE) 50 mcg/act nasal spray; 1 spray into each nostril 2 (two) times a day  -     ipratropium (ATROVENT) 0.06 % nasal spray; 2 sprays into each nostril 4 (four) times a day           Subjective      Hypertension  This is a recurrent problem. The current episode started 1 to 4 weeks ago. The problem has been gradually improving (Presented to the ED on 03/01 with elevated BP but resolved with no medication intervention) since onset. The problem is controlled. Associated symptoms include malaise/fatigue and sweats. Pertinent negatives include no blurred vision, chest pain, headaches, neck pain, palpitations or shortness of breath. Agents associated with hypertension include decongestants. Past treatments include nothing. The current treatment provides moderate improvement.     Review of Systems   Constitutional:  Positive for malaise/fatigue. Negative for activity change, chills, fatigue and fever.   HENT:  Negative for congestion, ear pain, rhinorrhea, sore throat and trouble swallowing.    Eyes:  Negative for blurred vision, pain and visual disturbance.   Respiratory:  Negative for cough, chest tightness and shortness of breath.    Cardiovascular:  Negative for chest pain, palpitations and leg swelling.   Gastrointestinal:  Negative for abdominal pain, constipation, diarrhea, nausea and vomiting.   Genitourinary:  Negative for difficulty urinating, dysuria, hematuria and urgency.   Musculoskeletal:  Negative for arthralgias, back pain and neck pain.   Skin:  Negative for color change and rash.   Neurological:  Negative for dizziness, seizures, syncope and headaches.   Psychiatric/Behavioral:  Negative for dysphoric mood. The patient is not nervous/anxious.    All other systems reviewed and are negative.      Current Outpatient Medications on File Prior to Visit   Medication Sig    amoxicillin (AMOXIL) 500 mg  "capsule Take 1 capsule (500 mg total) by mouth every 12 (twelve) hours for 10 days    Azelastine HCl 137 MCG/SPRAY SOLN 1-2 SPRAYS INTO EACH NOSTRIL 2 (TWO) TIMES A DAY AS NEEDED FOR RHINITIS USE IN EACH NOSTRIL AS DIRECTED    omeprazole (PriLOSEC) 40 MG capsule TAKE 1 CAPSULE BY MOUTH EVERY DAY    [DISCONTINUED] fluticasone (FLONASE) 50 mcg/act nasal spray 1 spray into each nostril 2 (two) times a day    [DISCONTINUED] levothyroxine 25 mcg tablet TAKE 1 TABLET BY MOUTH EVERY DAY    fexofenadine (ALLEGRA) 180 MG tablet Take 180 mg by mouth daily (Patient not taking: Reported on 2/28/2024)    [DISCONTINUED] ipratropium (ATROVENT) 0.06 % nasal spray 2 sprays into each nostril 4 (four) times a day (Patient not taking: Reported on 2/28/2024)       Objective     /72 (BP Location: Left arm, Patient Position: Sitting, Cuff Size: Adult)   Pulse 89   Temp 97.9 °F (36.6 °C) (Temporal)   Ht 5' 2\" (1.575 m)   Wt 71.2 kg (157 lb)   SpO2 98%   BMI 28.72 kg/m²     Physical Exam  Vitals and nursing note reviewed.   Constitutional:       Appearance: Normal appearance. She is normal weight.   HENT:      Head: Normocephalic.      Right Ear: Tympanic membrane, ear canal and external ear normal. There is no impacted cerumen.      Left Ear: Tympanic membrane, ear canal and external ear normal. There is no impacted cerumen.      Nose: Nose normal.      Mouth/Throat:      Mouth: Mucous membranes are moist.      Pharynx: Oropharynx is clear.   Eyes:      Extraocular Movements: Extraocular movements intact.      Conjunctiva/sclera: Conjunctivae normal.      Pupils: Pupils are equal, round, and reactive to light.   Cardiovascular:      Rate and Rhythm: Normal rate and regular rhythm.      Pulses: Normal pulses.      Heart sounds: Normal heart sounds.   Pulmonary:      Effort: Pulmonary effort is normal.      Breath sounds: Normal breath sounds.   Abdominal:      General: Bowel sounds are normal. There is no distension.      " Palpations: Abdomen is soft.      Tenderness: There is no abdominal tenderness.   Musculoskeletal:         General: Normal range of motion.      Cervical back: Normal range of motion.   Skin:     General: Skin is warm.      Capillary Refill: Capillary refill takes less than 2 seconds.   Neurological:      General: No focal deficit present.      Mental Status: She is alert and oriented to person, place, and time. Mental status is at baseline.   Psychiatric:         Mood and Affect: Mood normal.         Behavior: Behavior normal.         Thought Content: Thought content normal.         Judgment: Judgment normal.       SHEEBA Poole

## 2024-03-06 NOTE — ASSESSMENT & PLAN NOTE
11/22 TSH 3.78   Repeat TSH ordered    03/24 TSH 4.937 T4 0.74  Current medications: Levothyroxine 25mcg   Patient is having mild symptoms related to hypothyroidism  New medications: Levothyroxine 50mcg   Repeat TSH ordered in 3 months and will discuss at follow up appointment

## 2024-03-07 ENCOUNTER — OFFICE VISIT (OUTPATIENT)
Dept: URGENT CARE | Age: 59
End: 2024-03-07
Payer: COMMERCIAL

## 2024-03-07 ENCOUNTER — APPOINTMENT (OUTPATIENT)
Dept: RADIOLOGY | Age: 59
End: 2024-03-07
Payer: COMMERCIAL

## 2024-03-07 VITALS
DIASTOLIC BLOOD PRESSURE: 94 MMHG | RESPIRATION RATE: 18 BRPM | OXYGEN SATURATION: 98 % | TEMPERATURE: 96.4 F | SYSTOLIC BLOOD PRESSURE: 156 MMHG | HEART RATE: 94 BPM

## 2024-03-07 DIAGNOSIS — M79.602 LEFT ARM PAIN: ICD-10-CM

## 2024-03-07 DIAGNOSIS — R09.81 NASAL CONGESTION: ICD-10-CM

## 2024-03-07 DIAGNOSIS — M25.512 ACUTE PAIN OF LEFT SHOULDER: ICD-10-CM

## 2024-03-07 DIAGNOSIS — S42.255A CLOSED NONDISPLACED FRACTURE OF GREATER TUBEROSITY OF LEFT HUMERUS, INITIAL ENCOUNTER: Primary | ICD-10-CM

## 2024-03-07 DIAGNOSIS — R51.9 NONINTRACTABLE HEADACHE, UNSPECIFIED CHRONICITY PATTERN, UNSPECIFIED HEADACHE TYPE: ICD-10-CM

## 2024-03-07 DIAGNOSIS — R06.83 SNORING: Primary | ICD-10-CM

## 2024-03-07 PROCEDURE — 73030 X-RAY EXAM OF SHOULDER: CPT

## 2024-03-07 PROCEDURE — 99213 OFFICE O/P EST LOW 20 MIN: CPT

## 2024-03-07 PROCEDURE — 73060 X-RAY EXAM OF HUMERUS: CPT

## 2024-03-07 NOTE — PROGRESS NOTES
[facility]        NAME: Marie Lloyd is a 58 y.o. female  : 1965    MRN: [id number]  DATE: 2024  TIME: 11:08 AM    Assessment and Plan   Closed nondisplaced fracture of greater tuberosity of left humerus, initial encounter [S42.255A]  1. Closed nondisplaced fracture of greater tuberosity of left humerus, initial encounter  Ambulatory Referral to Orthopedic Surgery      2. Acute pain of left shoulder  XR shoulder 2+ vw left      3. Left arm pain  XR humerus left        Pt presents for eval of left shoulder/upper arm pain- was on the treadmill and about to fall . Grabbed handle and heard/felt pop and immediate pain- TTP left upper humerus- mild swelling- decreased ROM. Sensation intact. Xray noted abnormality- stat read obtained to confirm humeral fracture. Patient and  aware, ortho referral place- has own sling- christy size.     Patient Instructions       Follow up with orthopedics  Proceed to  ER if symptoms worsen.    If tests have been performed at [facility], our office will contact you with results if changes need to be made to the care plan discussed with you at the visit.  [facility] on [facility]    Chief Complaint     Chief Complaint   Patient presents with    Shoulder Injury     Pt c/o left shoulder pain, pt fell on treadmill yesterday. Pt tried to grab onto bar and felt a pull/pop in left shoulder.          History of Present Illness       Pt presents for eval of left shoulder/upper arm pain- was on the treadmill and about to fall . Grabbed handle and heard/felt pop and immediate pain- TTP left upper humerus- mild swelling- decreased ROM. Sensation intact. Xray noted abnormality- stat read obtained to confirm humeral fracture. Patient and  aware, ortho referral place- has own sling- christy size.         Review of Systems   Review of Systems   Constitutional:  Negative for chills, fatigue and fever.   HENT:  Negative for postnasal drip and  sore throat.    Respiratory:  Negative for cough and shortness of breath.    Cardiovascular:  Negative for chest pain and palpitations.   Gastrointestinal:  Negative for abdominal pain, nausea and vomiting.   Genitourinary:  Negative for dysuria.   Musculoskeletal:  Positive for arthralgias and joint swelling. Negative for gait problem.   Skin:  Negative for color change and rash.   Neurological:  Negative for dizziness, syncope, light-headedness, numbness and headaches.   Psychiatric/Behavioral:  Negative for agitation and confusion.    All other systems reviewed and are negative.        Current Medications       Current Outpatient Medications:     amoxicillin (AMOXIL) 500 mg capsule, Take 1 capsule (500 mg total) by mouth every 12 (twelve) hours for 10 days, Disp: 20 capsule, Rfl: 0    levothyroxine 50 mcg tablet, Take 1 tablet (50 mcg total) by mouth daily, Disp: 90 tablet, Rfl: 0    omeprazole (PriLOSEC) 40 MG capsule, TAKE 1 CAPSULE BY MOUTH EVERY DAY, Disp: 90 capsule, Rfl: 2    Azelastine HCl 137 MCG/SPRAY SOLN, 1-2 SPRAYS INTO EACH NOSTRIL 2 (TWO) TIMES A DAY AS NEEDED FOR RHINITIS USE IN EACH NOSTRIL AS DIRECTED (Patient not taking: Reported on 3/7/2024), Disp: 90 mL, Rfl: 2    fexofenadine (ALLEGRA) 180 MG tablet, Take 180 mg by mouth daily (Patient not taking: Reported on 2/28/2024), Disp: , Rfl:     fluticasone (FLONASE) 50 mcg/act nasal spray, 1 spray into each nostril 2 (two) times a day (Patient not taking: Reported on 3/7/2024), Disp: 16 g, Rfl: 3    ipratropium (ATROVENT) 0.06 % nasal spray, 2 sprays into each nostril 4 (four) times a day (Patient not taking: Reported on 3/7/2024), Disp: 15 mL, Rfl: 5    Current Allergies     Allergies as of 03/07/2024 - Reviewed 03/07/2024   Allergen Reaction Noted    Escitalopram  05/14/2018            The following portions of the patient's history were reviewed and updated as appropriate: allergies, current medications, past family history, past medical history,  past social history, past surgical history and problem list.     Past Medical History:   Diagnosis Date    Allergic rhinitis 12/5/2014    COVID-19 12/11/2020    COVID-19 virus infection 12/14/2020    Sx began on 12/10/20: cough, congestion, CP and chills. Covid positive at [facility] on 12/14/20. CXR showed bibasilar infiltrates.  with similar Sx, seen and tested at [facility] on 12/18/20 and positive. Telemedicine on 12/23/2020-she has a whole lot better and return to work as of 12/28/2020.    Disease of thyroid gland     Elevated blood-pressure reading without diagnosis of hypertension 4/29/2016    Gastroesophageal reflux disease without esophagitis 9/18/2019    H. pylori infection     Microscopic hematuria 5/9/2022    Minimal X1 ; U/S ok  Subsequent U/A ok     Seasonal allergies     Viral infection, unspecified 1/4/2022       Past Surgical History:   Procedure Laterality Date    COLONOSCOPY N/A 2/22/2016    Procedure: COLONOSCOPY;  Surgeon: Pepe Simon MD;  Location: [facility];  Service:        Family History   Problem Relation Age of Onset    Diabetes Mother     Alzheimer's disease Mother 72    Throat cancer Father 78    No Known Problems Sister     No Known Problems Sister     No Known Problems Daughter     No Known Problems Daughter     No Known Problems Maternal Grandmother     No Known Problems Maternal Grandfather     No Known Problems Paternal Grandmother     No Known Problems Paternal Grandfather     Diabetes Brother     No Known Problems Maternal Aunt     No Known Problems Maternal Aunt     No Known Problems Maternal Aunt     No Known Problems Maternal Aunt     No Known Problems Paternal Aunt     No Known Problems Paternal Aunt     No Known Problems Paternal Aunt          Medications have been verified.        Objective   /94   Pulse 94   Temp (!) 96.4 °F (35.8 °C) (Tympanic)   Resp 18   SpO2 98%   No LMP recorded. Patient is postmenopausal.       Physical Exam     Physical  Exam  Vitals reviewed.   Musculoskeletal:         General: Swelling, tenderness and signs of injury present. No deformity.   Skin:     General: Skin is warm and dry.

## 2024-03-11 ENCOUNTER — APPOINTMENT (OUTPATIENT)
Dept: RADIOLOGY | Age: 59
End: 2024-03-11
Payer: COMMERCIAL

## 2024-03-11 ENCOUNTER — OFFICE VISIT (OUTPATIENT)
Dept: OBGYN CLINIC | Facility: CLINIC | Age: 59
End: 2024-03-11
Payer: COMMERCIAL

## 2024-03-11 VITALS
WEIGHT: 157 LBS | HEART RATE: 103 BPM | BODY MASS INDEX: 28.89 KG/M2 | SYSTOLIC BLOOD PRESSURE: 143 MMHG | DIASTOLIC BLOOD PRESSURE: 95 MMHG | HEIGHT: 62 IN

## 2024-03-11 DIAGNOSIS — S42.255A CLOSED NONDISPLACED FRACTURE OF GREATER TUBEROSITY OF LEFT HUMERUS, INITIAL ENCOUNTER: Primary | ICD-10-CM

## 2024-03-11 DIAGNOSIS — S42.255A CLOSED NONDISPLACED FRACTURE OF GREATER TUBEROSITY OF LEFT HUMERUS, INITIAL ENCOUNTER: ICD-10-CM

## 2024-03-11 PROCEDURE — 99204 OFFICE O/P NEW MOD 45 MIN: CPT | Performed by: ORTHOPAEDIC SURGERY

## 2024-03-11 PROCEDURE — 73030 X-RAY EXAM OF SHOULDER: CPT

## 2024-03-11 NOTE — PROGRESS NOTES
CHIEF COMPLAIN/REASON FOR VISIT  Chief Complaint   Patient presents with    Left Shoulder - Pain       HISTORY OF PRESENT ILLNESS  Marie Lloyd is a RHD 58 y.o. female who presents for evaluation of their left shoulder.  Patient saw urgent care on March 7 for a fall she sustained while on the treadmill.  She had an x-ray done which showed acute nondisplaced greater tuberosity fracture.  She was given sling and advised to follow-up with orthopedics.  Today, patient comes in for evaluation. Patient said she continues to experience pain in the shoulder.     REVIEW OF SYSTEMS  Review of systems was performed and, woutside that mentioned in the HPI, it was negative for symptomology related to the integumentary, hematologic, immunologic, allergic, neurologic, cardiovascular, respiratory, GI or  systems.     MEDICAL HISTORY  Patient Active Problem List   Diagnosis    Chronic rhinitis    Elevated BP without diagnosis of hypertension    Gastroesophageal reflux disease without esophagitis    Acquired hypothyroidism    Other hyperlipidemia    Hyperglycemia       SURGICAL HISTORY  Past Surgical History:   Procedure Laterality Date    COLONOSCOPY N/A 2/22/2016    Procedure: COLONOSCOPY;  Surgeon: Pepe Simon MD;  Location: [facility];  Service:        CURRENT MEDICATIONS    Current Outpatient Medications:     levothyroxine 50 mcg tablet, Take 1 tablet (50 mcg total) by mouth daily, Disp: 90 tablet, Rfl: 0    omeprazole (PriLOSEC) 40 MG capsule, TAKE 1 CAPSULE BY MOUTH EVERY DAY, Disp: 90 capsule, Rfl: 2    Azelastine HCl 137 MCG/SPRAY SOLN, 1-2 SPRAYS INTO EACH NOSTRIL 2 (TWO) TIMES A DAY AS NEEDED FOR RHINITIS USE IN EACH NOSTRIL AS DIRECTED (Patient not taking: Reported on 3/7/2024), Disp: 90 mL, Rfl: 2    fexofenadine (ALLEGRA) 180 MG tablet, Take 180 mg by mouth daily (Patient not taking: Reported on 2/28/2024), Disp: , Rfl:     fluticasone (FLONASE) 50 mcg/act nasal spray, 1 spray into each nostril 2 (two) times a day  "(Patient not taking: Reported on 3/7/2024), Disp: 16 g, Rfl: 3    ipratropium (ATROVENT) 0.06 % nasal spray, 2 sprays into each nostril 4 (four) times a day (Patient not taking: Reported on 3/7/2024), Disp: 15 mL, Rfl: 5    SOCIAL HISTORY  Social History     Socioeconomic History    Marital status: /Civil Union     Spouse name: Not on file    Number of children: Not on file    Years of education: Not on file    Highest education level: Not on file   Occupational History    Not on file   Tobacco Use    Smoking status: Never    Smokeless tobacco: Never    Tobacco comments:     no exposure to passive smoke   Vaping Use    Vaping status: Never Used   Substance and Sexual Activity    Alcohol use: No    Drug use: No    Sexual activity: Not on file   Other Topics Concern    Not on file   Social History Narrative    Do you have pets? none Is pet allowed in bedroom?NA    Are you a smoker? Never    Does anyone smoke in your home? No       Do you live with smokers? No    [place] frequently? No   How many times a year? N/A      Social Determinants of Health     Financial Resource Strain: Not on file   Food Insecurity: Not on file   Transportation Needs: Not on file   Physical Activity: Not on file   Stress: Not on file   Social Connections: Not on file   Intimate Partner Violence: Not on file   Housing Stability: Not on file       Objective     VITAL SIGNS  /95   Pulse 103   Ht 5' 2\" (1.575 m)   Wt 71.2 kg (157 lb)   BMI 28.72 kg/m²      PHYSICAL EXAM  General:   Well-appearing  No acute distress  Appears stated age    Left Shoulder  Sitting comfortably in sling in no acute distress  Range of motion of the shoulder not tested due to recent injury  Range of motion of the wrist, hand, and fingers intact  Fingers warm perfused  Neurovascular intact distally    RADIOGRAPHIC EXAMINATION/DIAGNOSTICS:  Procedure: XR shoulder 2+ vw left    Result Date: 3/7/2024  Narrative: XR SHOULDER 2+ VW LEFT, XR HUMERUS LEFT " INDICATION: M25.512: Pain in left shoulder. COMPARISON: None FINDINGS: Acute nondisplaced fracture of the left humeral greater tuberosity. No dislocation. No significant degenerative changes. No lytic or blastic osseous lesion. Unremarkable soft tissues.     Impression: Acute nondisplaced greater tuberosity fracture. Workstation performed: [id number]     Procedure: XR humerus left    Result Date: 3/7/2024  Narrative: XR SHOULDER 2+ VW LEFT, XR HUMERUS LEFT INDICATION: M25.512: Pain in left shoulder. COMPARISON: None FINDINGS: Acute nondisplaced fracture of the left humeral greater tuberosity. No dislocation. No significant degenerative changes. No lytic or blastic osseous lesion. Unremarkable soft tissues.     Impression: Acute nondisplaced greater tuberosity fracture. Workstation performed: [id number]      ASSESSMENT/PLAN:  Left shoulder pain; nondisplaced greater tuberosity fracture    Given patient history, exam findings, and diagnostic imaging, she appears to a stated nondisplaced greater tuberosity fracture.  Reassured patient that x-rays obtained today do not show any new displacement of the greater tuberosity.  Recommended conservative management with sling for the next 2 to 4 weeks.  Recommended physical therapy as well to help with range of motion of the elbow, wrist, and hands to avoid stiffness.  PT order placed.  Recommended RICE and anti-inflammatories as needed.  Will plan to see the patient back in 1 month for repeat x-rays of the left shoulder.  Patient is agreeable and understanding to the above plan.  She will call with any other questions or concerns.    Scribe Attestation      I,:  Dewayne Iverson PA-C am acting as a scribe while in the presence of the attending physician.:       I,:  Naif Montesinos MD personally performed the services described in this documentation    as scribed in my presence.:

## 2024-03-30 DIAGNOSIS — R09.81 NASAL CONGESTION: ICD-10-CM

## 2024-03-30 DIAGNOSIS — J31.0 CHRONIC RHINITIS: ICD-10-CM

## 2024-03-30 RX ORDER — FLUTICASONE PROPIONATE 50 MCG
SPRAY, SUSPENSION (ML) NASAL
Qty: 48 ML | Refills: 2 | Status: SHIPPED | OUTPATIENT
Start: 2024-03-30

## 2024-04-15 ENCOUNTER — OFFICE VISIT (OUTPATIENT)
Dept: OBGYN CLINIC | Facility: CLINIC | Age: 59
End: 2024-04-15
Payer: COMMERCIAL

## 2024-04-15 ENCOUNTER — APPOINTMENT (OUTPATIENT)
Dept: RADIOLOGY | Age: 59
End: 2024-04-15
Payer: COMMERCIAL

## 2024-04-15 VITALS
SYSTOLIC BLOOD PRESSURE: 129 MMHG | DIASTOLIC BLOOD PRESSURE: 87 MMHG | WEIGHT: 158.2 LBS | HEART RATE: 94 BPM | BODY MASS INDEX: 29.11 KG/M2 | HEIGHT: 62 IN

## 2024-04-15 DIAGNOSIS — S42.255A CLOSED NONDISPLACED FRACTURE OF GREATER TUBEROSITY OF LEFT HUMERUS, INITIAL ENCOUNTER: ICD-10-CM

## 2024-04-15 DIAGNOSIS — S42.255A CLOSED NONDISPLACED FRACTURE OF GREATER TUBEROSITY OF LEFT HUMERUS, INITIAL ENCOUNTER: Primary | ICD-10-CM

## 2024-04-15 PROCEDURE — 73030 X-RAY EXAM OF SHOULDER: CPT

## 2024-04-15 PROCEDURE — 99213 OFFICE O/P EST LOW 20 MIN: CPT | Performed by: ORTHOPAEDIC SURGERY

## 2024-04-15 NOTE — PROGRESS NOTES
CHIEF COMPLAIN/REASON FOR VISIT  Chief Complaint   Patient presents with    Left Shoulder - Follow-up       HISTORY OF PRESENT ILLNESS  Marie Lloyd is a RHD 58 y.o. female who presents for follow up of their left shoulder with history of greater tuberosity fracture sustained 3/7/2024 with a fall.  She is doing well.  Today she denies left shoulder pain.  She admits to starting to wean out of sling.  She has not initiated physical therapy at this time yet has a visit scheduled.      REVIEW OF SYSTEMS  Review of systems was performed and, woutside that mentioned in the HPI, it was negative for symptomology related to the integumentary, hematologic, immunologic, allergic, neurologic, cardiovascular, respiratory, GI or  systems.     MEDICAL HISTORY  Patient Active Problem List   Diagnosis    Chronic rhinitis    Elevated BP without diagnosis of hypertension    Gastroesophageal reflux disease without esophagitis    Acquired hypothyroidism    Other hyperlipidemia    Hyperglycemia       SURGICAL HISTORY  Past Surgical History:   Procedure Laterality Date    COLONOSCOPY N/A 2/22/2016    Procedure: COLONOSCOPY;  Surgeon: Pepe Simon MD;  Location: [facility];  Service:        CURRENT MEDICATIONS    Current Outpatient Medications:     fluticasone (FLONASE) 50 mcg/act nasal spray, SPRAY 1 SPRAY INTO EACH NOSTRIL TWICE A DAY, Disp: 48 mL, Rfl: 2    levothyroxine 50 mcg tablet, Take 1 tablet (50 mcg total) by mouth daily, Disp: 90 tablet, Rfl: 0    omeprazole (PriLOSEC) 40 MG capsule, TAKE 1 CAPSULE BY MOUTH EVERY DAY, Disp: 90 capsule, Rfl: 2    Azelastine HCl 137 MCG/SPRAY SOLN, 1-2 SPRAYS INTO EACH NOSTRIL 2 (TWO) TIMES A DAY AS NEEDED FOR RHINITIS USE IN EACH NOSTRIL AS DIRECTED (Patient not taking: Reported on 3/7/2024), Disp: 90 mL, Rfl: 2    fexofenadine (ALLEGRA) 180 MG tablet, Take 180 mg by mouth daily (Patient not taking: Reported on 2/28/2024), Disp: , Rfl:     ipratropium (ATROVENT) 0.06 % nasal spray, 2 sprays  "into each nostril 4 (four) times a day (Patient not taking: Reported on 3/7/2024), Disp: 15 mL, Rfl: 5    SOCIAL HISTORY  Social History     Socioeconomic History    Marital status: /Civil Union     Spouse name: Not on file    Number of children: Not on file    Years of education: Not on file    Highest education level: Not on file   Occupational History    Not on file   Tobacco Use    Smoking status: Never    Smokeless tobacco: Never    Tobacco comments:     no exposure to passive smoke   Vaping Use    Vaping status: Never Used   Substance and Sexual Activity    Alcohol use: No    Drug use: No    Sexual activity: Not on file   Other Topics Concern    Not on file   Social History Narrative    Do you have pets? none Is pet allowed in bedroom?NA    Are you a smoker? Never    Does anyone smoke in your home? No       Do you live with smokers? No    [place] frequently? No   How many times a year? N/A      Social Determinants of Health     Financial Resource Strain: Not on file   Food Insecurity: Not on file   Transportation Needs: Not on file   Physical Activity: Not on file   Stress: Not on file   Social Connections: Not on file   Intimate Partner Violence: Not on file   Housing Stability: Not on file       Objective     VITAL SIGNS  /87 (BP Location: Right arm, Patient Position: Sitting, Cuff Size: Standard)   Pulse 94   Ht 5' 2\" (1.575 m)   Wt 71.8 kg (158 lb 3.2 oz)   BMI 28.94 kg/m²      PHYSICAL EXAM  General:   Well-appearing  No acute distress  Appears stated age    Left Shoulder  Sitting comfortably in sling in no acute distress  Passive abduction 160  IR T10  Negative drop arm test   Range of motion of the wrist, hand, and fingers intact  Fingers warm perfused  Neurovascular intact distally    RADIOGRAPHIC EXAMINATION/DIAGNOSTICS:     Left shoulder x-rays 2+ views of 4/15/2024:  demonstrate interval healing of stable fracture site of greater tuberosity.  Increased callus formation " witnessed.      ASSESSMENT/PLAN:  Left shoulder pain; nondisplaced greater tuberosity fracture    Left greater tuberosity fracture sustained 3/7/2024.  She is currently doing well.  Radiograph displays increased callus formation over fracture site.  She has mild restriction and weakness of left shoulder.  She should initiate physical therapy. She can stp use of sling.  She can follow up as needed yet knows to contact office or return with any new or old issues.      Scribe Attestation      I,:  Ezekiel Fischer am acting as a scribe while in the presence of the attending physician.:       I,:  Naif Montesinos MD personally performed the services described in this documentation    as scribed in my presence.:

## 2024-04-18 ENCOUNTER — EVALUATION (OUTPATIENT)
Dept: PHYSICAL THERAPY | Facility: CLINIC | Age: 59
End: 2024-04-18
Payer: COMMERCIAL

## 2024-04-18 DIAGNOSIS — S42.255A CLOSED NONDISPLACED FRACTURE OF GREATER TUBEROSITY OF LEFT HUMERUS, INITIAL ENCOUNTER: ICD-10-CM

## 2024-04-18 PROCEDURE — 97161 PT EVAL LOW COMPLEX 20 MIN: CPT | Performed by: PHYSICAL THERAPIST

## 2024-04-18 PROCEDURE — 97140 MANUAL THERAPY 1/> REGIONS: CPT | Performed by: PHYSICAL THERAPIST

## 2024-04-18 PROCEDURE — 97110 THERAPEUTIC EXERCISES: CPT | Performed by: PHYSICAL THERAPIST

## 2024-04-18 NOTE — PROGRESS NOTES
PT Evaluation     Today's date: 2024  Patient name: Marie Lloyd  : 1965  MRN: [id number]  Referring provider: Dewayne Iverson PA*  Dx:   Encounter Diagnosis     ICD-10-CM    1. Closed nondisplaced fracture of greater tuberosity of left humerus, initial encounter  S42.255A Ambulatory Referral to Physical Therapy                     Assessment  Assessment details: Pt is a 58 y.o. year old female presenting to physical therapy for Closed nondisplaced fracture of greater tuberosity of left humerus after fall on 3/7/24.  She presents with the following impairments: limited L shoulder AROM deficits, L UE weakness, and TTP in RTC tendons affecting her function with lifting, carrying, reaching, sleeping, cleaning, cooking, and completing household chores.  Pt will benefit from skilled physical therapy to address functional limitations noted in evaluation and meet patient goals.  Impairments: abnormal or restricted ROM, abnormal movement, activity intolerance, impaired physical strength, lacks appropriate home exercise program and poor body mechanics    Symptom irritability: moderate  Goals  ST. Pt will reduce her pain to 2/10 at worst.  2. Pt will improve L shoulder flexion to 135 degrees.    LT. Pt will improve L shoulder flexion ROM to 145 degrees for improved reaching.  2. Pt will improve L shoulder ABD strength to 4+/5 for improved lifting.  3. Pt will meet projected FOTO score.    Plan  Patient would benefit from: PT eval and skilled physical therapy  Planned modality interventions: cryotherapy, TENS, thermotherapy: hydrocollator packs, unattended electrical stimulation and electrical stimulation/Russian stimulation  Planned therapy interventions: joint mobilization, abdominal trunk stabilization, manual therapy, neuromuscular re-education, body mechanics training, patient education, strengthening, stretching, therapeutic activities, therapeutic exercise, flexibility, functional ROM  exercises and home exercise program  Frequency: 2x week  Duration in weeks: 6  Treatment plan discussed with: family and patient        Subjective Evaluation    History of Present Illness  Mechanism of injury: trauma  Mechanism of injury: Pt had a fall about one month ago (3/7/24) landing with her arm outstretched while she was on the treadmill.  She broke her L humerus and was in a sling for the past 5 weeks.  Her bone is healing well per most recent x-rays and MD follow up.  She denies any numbness or tingling and is not taking any medications.  She has pain and limited movement in certain directions.  She would like to get back to her normal routine cooking, cleaning, and completing activities around her house without pain or limitations  Pain  Current pain ratin  At worst pain ratin          Objective     Tenderness     Left Shoulder   Tenderness in the infraspinatus tendon and supraspinatus tendon. No tenderness in the acromion, biceps tendon (proximal), bicipital groove and subscapularis tendon.     Active Range of Motion   Left Shoulder   Flexion: 125 degrees with pain  External rotation BTH: T2   Internal rotation BTB: T10 with pain    Right Shoulder   Flexion: 165 degrees   External rotation BTH: T2   Internal rotation BTB: T7     Passive Range of Motion   Left Shoulder   Flexion: 135 degrees with pain  External rotation 90°: 35 degrees with pain  Internal rotation 90°: 65 degrees with pain    Right Shoulder   External rotation 90°: 90 degrees   Internal rotation 90°: 80 degrees     Strength/Myotome Testing     Left Shoulder     Planes of Motion   Flexion: 4-   Abduction: 4-   External rotation at 0°: 4-   Internal rotation at 0°: 4+     Right Shoulder     Planes of Motion   Flexion: 5   Abduction: 5   External rotation at 0°: 5   Internal rotation at 0°: 5              Precautions: L humerus fx on 3/7/24    Date             Visit # IE            FOTO IE             Re-eval IE           "    Manuals 4/18            L shoulder PROM SF                                                   Neuro Re-Ed             No money 10x BTB            Hor ABD             PNF                                                                 Ther Ex             Pulleys             Table slides 10x            Flexion AAROM 5x10\"            ER AAROM 5x10\"            Rows/ext 10x GTB            IR str             Pec str             ER/IR                                       Ther Activity                                       Gait Training                                       Modalities                                            "

## 2024-04-18 NOTE — LETTER
2024    Dewayne Iverson PA-C  [id number] [facility] [id number]    Patient: Marie Lloyd   YOB: 1965   Date of Visit: 2024     Encounter Diagnosis     ICD-10-CM    1. Closed nondisplaced fracture of greater tuberosity of left humerus, initial encounter  S42.255A Ambulatory Referral to Physical Therapy          Dear Dr. Iverson:    Thank you for your recent referral of Marie Lloyd. Please review the attached evaluation summary from Marie's recent visit.     Please verify that you agree with the plan of care by signing the attached order.     If you have any questions or concerns, please do not hesitate to call.     I sincerely appreciate the opportunity to share in the care of one of your patients and hope to have another opportunity to work with you in the near future.       Sincerely,    Emiliano Rajan, PT      Referring Provider:      I certify that I have read the below Plan of Care and certify the need for these services furnished under this plan of treatment while under my care.                    Dewayne Iverson PA-C  [address] [facility] [id number]  Via In Basket          PT Evaluation     Today's date: 2024  Patient name: Marie Lloyd  : 1965  MRN: [id number]  Referring provider: Dewayne Iverson PA*  Dx:   Encounter Diagnosis     ICD-10-CM    1. Closed nondisplaced fracture of greater tuberosity of left humerus, initial encounter  S42.255A Ambulatory Referral to Physical Therapy                     Assessment  Assessment details: Pt is a 58 y.o. year old female presenting to physical therapy for Closed nondisplaced fracture of greater tuberosity of left humerus after fall on 3/7/24.  She presents with the following impairments: limited L shoulder AROM deficits, L UE weakness, and TTP in RTC tendons affecting her function with lifting, carrying, reaching, sleeping, cleaning, cooking, and completing household chores.  Pt will  benefit from skilled physical therapy to address functional limitations noted in evaluation and meet patient goals.  Impairments: abnormal or restricted ROM, abnormal movement, activity intolerance, impaired physical strength, lacks appropriate home exercise program and poor body mechanics    Symptom irritability: moderate  Goals  ST. Pt will reduce her pain to 2/10 at worst.  2. Pt will improve L shoulder flexion to 135 degrees.    LT. Pt will improve L shoulder flexion ROM to 145 degrees for improved reaching.  2. Pt will improve L shoulder ABD strength to 4+/5 for improved lifting.  3. Pt will meet projected FOTO score.    Plan  Patient would benefit from: PT eval and skilled physical therapy  Planned modality interventions: cryotherapy, TENS, thermotherapy: hydrocollator packs, unattended electrical stimulation and electrical stimulation/Russian stimulation  Planned therapy interventions: joint mobilization, abdominal trunk stabilization, manual therapy, neuromuscular re-education, body mechanics training, patient education, strengthening, stretching, therapeutic activities, therapeutic exercise, flexibility, functional ROM exercises and home exercise program  Frequency: 2x week  Duration in weeks: 6  Treatment plan discussed with: family and patient        Subjective Evaluation    History of Present Illness  Mechanism of injury: trauma  Mechanism of injury: Pt had a fall about one month ago (3/7/24) landing with her arm outstretched while she was on the treadmill.  She broke her L humerus and was in a sling for the past 5 weeks.  Her bone is healing well per most recent x-rays and MD follow up.  She denies any numbness or tingling and is not taking any medications.  She has pain and limited movement in certain directions.  She would like to get back to her normal routine cooking, cleaning, and completing activities around her house without pain or limitations  Pain  Current pain ratin  At worst  "pain ratin          Objective     Tenderness     Left Shoulder   Tenderness in the infraspinatus tendon and supraspinatus tendon. No tenderness in the acromion, biceps tendon (proximal), bicipital groove and subscapularis tendon.     Active Range of Motion   Left Shoulder   Flexion: 125 degrees with pain  External rotation BTH: T2   Internal rotation BTB: T10 with pain    Right Shoulder   Flexion: 165 degrees   External rotation BTH: T2   Internal rotation BTB: T7     Passive Range of Motion   Left Shoulder   Flexion: 135 degrees with pain  External rotation 90°: 35 degrees with pain  Internal rotation 90°: 65 degrees with pain    Right Shoulder   External rotation 90°: 90 degrees   Internal rotation 90°: 80 degrees     Strength/Myotome Testing     Left Shoulder     Planes of Motion   Flexion: 4-   Abduction: 4-   External rotation at 0°: 4-   Internal rotation at 0°: 4+     Right Shoulder     Planes of Motion   Flexion: 5   Abduction: 5   External rotation at 0°: 5   Internal rotation at 0°: 5              Precautions: L humerus fx on 3/7/24    Date             Visit # IE            FOTO IE             Re-eval IE              Manuals             L shoulder PROM SF                                                   Neuro Re-Ed             No money 10x BTB            Hor ABD             PNF                                                                 Ther Ex             Pulleys             Table slides 10x            Flexion AAROM 5x10\"            ER AAROM 5x10\"            Rows/ext 10x GTB            IR str             Pec str             ER/IR                                       Ther Activity                                       Gait Training                                       Modalities                                                            "

## 2024-04-24 ENCOUNTER — APPOINTMENT (OUTPATIENT)
Dept: PHYSICAL THERAPY | Facility: CLINIC | Age: 59
End: 2024-04-24
Payer: COMMERCIAL

## 2024-05-30 LAB
ALBUMIN SERPL-MCNC: 4.2 G/DL (ref 3.6–5.1)
ALBUMIN/GLOB SERPL: 1.3 (CALC) (ref 1–2.5)
ALP SERPL-CCNC: 100 U/L (ref 37–153)
ALT SERPL-CCNC: 31 U/L (ref 6–29)
AST SERPL-CCNC: 22 U/L (ref 10–35)
BASOPHILS # BLD AUTO: 39 CELLS/UL (ref 0–200)
BASOPHILS NFR BLD AUTO: 0.6 %
BILIRUB SERPL-MCNC: 0.6 MG/DL (ref 0.2–1.2)
BUN SERPL-MCNC: 15 MG/DL (ref 7–25)
BUN/CREAT SERPL: ABNORMAL (CALC) (ref 6–22)
CALCIUM SERPL-MCNC: 9.6 MG/DL (ref 8.6–10.4)
CHLORIDE SERPL-SCNC: 100 MMOL/L (ref 98–110)
CHOLEST SERPL-MCNC: 265 MG/DL
CHOLEST/HDLC SERPL: 6.3 (CALC)
CO2 SERPL-SCNC: 29 MMOL/L (ref 20–32)
CREAT SERPL-MCNC: 0.71 MG/DL (ref 0.5–1.05)
EOSINOPHIL # BLD AUTO: 559 CELLS/UL (ref 15–500)
EOSINOPHIL NFR BLD AUTO: 8.6 %
ERYTHROCYTE [DISTWIDTH] IN BLOOD BY AUTOMATED COUNT: 13.1 % (ref 11–15)
GFR/BSA.PRED SERPLBLD CYS-BASED-ARV: 97 ML/MIN/1.73M2
GLOBULIN SER CALC-MCNC: 3.2 G/DL (CALC) (ref 1.9–3.7)
GLUCOSE SERPL-MCNC: 97 MG/DL (ref 65–99)
HBA1C MFR BLD: 5.7 % OF TOTAL HGB
HCT VFR BLD AUTO: 39.1 % (ref 35–45)
HDLC SERPL-MCNC: 42 MG/DL
HGB BLD-MCNC: 13.2 G/DL (ref 11.7–15.5)
LDLC SERPL CALC-MCNC: 176 MG/DL (CALC)
LYMPHOCYTES # BLD AUTO: 2594 CELLS/UL (ref 850–3900)
LYMPHOCYTES NFR BLD AUTO: 39.9 %
MCH RBC QN AUTO: 28.8 PG (ref 27–33)
MCHC RBC AUTO-ENTMCNC: 33.8 G/DL (ref 32–36)
MCV RBC AUTO: 85.4 FL (ref 80–100)
MONOCYTES # BLD AUTO: 351 CELLS/UL (ref 200–950)
MONOCYTES NFR BLD AUTO: 5.4 %
NEUTROPHILS # BLD AUTO: 2958 CELLS/UL (ref 1500–7800)
NEUTROPHILS NFR BLD AUTO: 45.5 %
NONHDLC SERPL-MCNC: 223 MG/DL (CALC)
PLATELET # BLD AUTO: 196 THOUSAND/UL (ref 140–400)
PMV BLD REES-ECKER: 11.9 FL (ref 7.5–12.5)
POTASSIUM SERPL-SCNC: 4.5 MMOL/L (ref 3.5–5.3)
PROT SERPL-MCNC: 7.4 G/DL (ref 6.1–8.1)
RBC # BLD AUTO: 4.58 MILLION/UL (ref 3.8–5.1)
SODIUM SERPL-SCNC: 137 MMOL/L (ref 135–146)
TRIGL SERPL-MCNC: 265 MG/DL
TSH SERPL-ACNC: 3 MIU/L (ref 0.4–4.5)
WBC # BLD AUTO: 6.5 THOUSAND/UL (ref 3.8–10.8)

## 2024-06-03 DIAGNOSIS — R51.9 NONINTRACTABLE HEADACHE, UNSPECIFIED CHRONICITY PATTERN, UNSPECIFIED HEADACHE TYPE: ICD-10-CM

## 2024-06-03 RX ORDER — LEVOTHYROXINE SODIUM 0.05 MG/1
50 TABLET ORAL DAILY
Qty: 90 TABLET | Refills: 1 | Status: SHIPPED | OUTPATIENT
Start: 2024-06-03

## 2024-06-10 ENCOUNTER — OFFICE VISIT (OUTPATIENT)
Dept: FAMILY MEDICINE CLINIC | Facility: CLINIC | Age: 59
End: 2024-06-10
Payer: COMMERCIAL

## 2024-06-10 VITALS
RESPIRATION RATE: 18 BRPM | TEMPERATURE: 98 F | WEIGHT: 156.2 LBS | BODY MASS INDEX: 28.74 KG/M2 | HEART RATE: 83 BPM | OXYGEN SATURATION: 97 % | DIASTOLIC BLOOD PRESSURE: 80 MMHG | HEIGHT: 62 IN | SYSTOLIC BLOOD PRESSURE: 118 MMHG

## 2024-06-10 DIAGNOSIS — J31.0 CHRONIC RHINITIS: ICD-10-CM

## 2024-06-10 DIAGNOSIS — Z09 FOLLOW-UP EXAM: Primary | ICD-10-CM

## 2024-06-10 DIAGNOSIS — E03.9 ACQUIRED HYPOTHYROIDISM: ICD-10-CM

## 2024-06-10 DIAGNOSIS — R73.9 HYPERGLYCEMIA: Chronic | ICD-10-CM

## 2024-06-10 DIAGNOSIS — S42.255D CLOSED NONDISPLACED FRACTURE OF GREATER TUBEROSITY OF LEFT HUMERUS WITH ROUTINE HEALING, SUBSEQUENT ENCOUNTER: ICD-10-CM

## 2024-06-10 DIAGNOSIS — K21.9 GASTROESOPHAGEAL REFLUX DISEASE WITHOUT ESOPHAGITIS: Chronic | ICD-10-CM

## 2024-06-10 DIAGNOSIS — E78.49 OTHER HYPERLIPIDEMIA: Chronic | ICD-10-CM

## 2024-06-10 DIAGNOSIS — R03.0 ELEVATED BP WITHOUT DIAGNOSIS OF HYPERTENSION: ICD-10-CM

## 2024-06-10 PROCEDURE — 99214 OFFICE O/P EST MOD 30 MIN: CPT

## 2024-06-10 RX ORDER — OMEGA-3-ACID ETHYL ESTERS 1 G/1
2 CAPSULE, LIQUID FILLED ORAL 2 TIMES DAILY
Qty: 180 CAPSULE | Refills: 5 | Status: SHIPPED | OUTPATIENT
Start: 2024-06-10

## 2024-06-10 NOTE — ASSESSMENT & PLAN NOTE
12/22 Lipid panel   Cholesterol 264, Triglycerides 198, LDL 42,   05/24 Lipid panel   Cholesterol 265, Triglycerides 265, LDL 42,   Current medications: None   New medications: Omega-3   Patient is hesitant to start medication, we will start omega-3 and repeat lipid panel in 3 months   Lifestyle modification: Incorporate regular exercise, avoid sugars and simple carbohydrates, weight loss and choosing healthier fats.

## 2024-06-10 NOTE — PATIENT INSTRUCTIONS
Exercises for Internal and External Shoulder Rotation   WHAT YOU NEED TO KNOW:   Exercises for internal shoulder rotation work the muscles in your chest and front of your shoulder. Exercises for external shoulder rotation work the muscles in the back of your shoOmega-3-Acid Ethyl Esters (By mouth)   Omega-3-Acid Ethyl Esters (oh-MAY-ga-3-AS-id [facility]-il ES-ters)  Lowers high triglyceride levels.   Brand Name(s): Gabino Mcdonald   There may be other brand names for this medicine.  When This Medicine Should Not Be Used:   This medicine is not right for everyone. Do not use it if you had an allergic reaction to omega-3-acid ethyl esters.  How to Use This Medicine:   Liquid Filled Capsule  Your doctor will tell you how much medicine to use. Do not use more than directed. Take this medicine at the same time each day.  It is best to take this medicine with food or milk.  Swallow the capsule whole. Do not break, crush, chew, dissolve, or open the capsule. Do not let this medicine come into contact with foam plastic cups (Styrofoam).  Read and follow the patient instructions that come with this medicine. Talk to your doctor or pharmacist if you have any questions.  Missed dose: Take a dose as soon as you remember. If it is almost time for your next dose, wait until then and take a regular dose. Do not take extra medicine to make up for a missed dose.  Store the medicine in a closed container at room temperature, away from heat, moisture, and direct light.  Drugs and Foods to Avoid:   Ask your doctor or pharmacist before using any other medicine, including over-the-counter medicines, vitamins, and herbal products.  Some medicines can affect how omega-3-acid ethyl esters work. Tell your doctor if you are using any of the following:   Clopidogrel  An NSAID pain or arthritis medicine (such as aspirin, diclofenac, ibuprofen, naproxen)  A blood thinner (such as warfarin)  Warnings While Using This Medicine:   Tell your doctor if you  are pregnant or breastfeeding, if you have liver disease, an underactive thyroid, a heart rhythm problem, or an allergy to fish or shellfish.  Your doctor will do lab tests at regular visits to check on the effects of this medicine. Keep all appointments.  Keep all medicine out of the reach of children. Never share your medicine with anyone.  Possible Side Effects While Using This Medicine:   Call your doctor right away if you notice any of these side effects:  Allergic reaction: Itching or hives, swelling in your face or hands, swelling or tingling in your mouth or throat, chest tightness, trouble breathing  Fast or uneven heartbeat, dizziness, fainting  Unusual bleeding or bruising  If you notice these less serious side effects, talk with your doctor:   Burping, stomach upset  Change in taste  If you notice other side effects that you think are caused by this medicine, tell your doctor.   Call your doctor for medical advice about side effects. You may report side effects to FDA at 0-391-FDA-3371  © Copyright [employer] 2023 Information is for End User's use only and may not be sold, redistributed or otherwise used for commercial purposes.  The above information is an  only. It is not intended as medical advice for individual conditions or treatments. Talk to your doctor, nurse or pharmacist before following any medical regimen to see if it is safe and effective for you.  ulder and upper back.  DISCHARGE INSTRUCTIONS:   Call your doctor or physical therapist if:   You have sharp or worsening pain during exercise or at rest.    You have questions or concerns about your shoulder exercises.    Before you exercise:  Warm up and stretch before you exercise. Walk or ride a stationary bike for 5 to 10 minutes to help you warm up. Stretching helps increase range of motion. It may also decrease muscle soreness and help prevent another injury. Your healthcare provider will tell you which of the following  stretches to do:  Crossover arm stretch:  Relax your shoulders. Hold your upper arm with the opposite hand. Pull your arm across your chest until you feel a stretch. Hold the stretch for 30 seconds. Return to the starting position.         Shoulder flexion stretch:  Stand facing a wall. Slowly walk your fingers up the wall until you feel a stretch. Hold the stretch for 30 seconds. Return to the starting position.         Sleeper stretch:  Lie on your injured side on a firm, flat surface. Bend the elbow of your injured arm 90° with your hand facing up. Use your arm that is not injured to slowly push your injured arm down. Stop when you feel a stretch at the back of your injured shoulder. Hold the stretch for 30 seconds. Slowly return to the starting position.       How to exercise with a weight:  Your healthcare provider or physical therapist will tell you how much weight to use.  Shoulder internal rotation:  Sit in a chair. Place a rolled up towel between your elbow and your side. Bend your elbow to 90°. Gently squeeze the towel with your elbow to prevent it from falling out. Hold the weight with your thumb pointing up. Slowly move the weight across your chest. Stop when your hand reaches your opposite arm. Hold this position for as many seconds as directed. Slowly return to the starting position.    Shoulder external rotation:  Lie on your side with your injured shoulder facing up. Bend your elbow 90°. Place a rolled up towel between your elbow and your side. Hold a weight in your hand. Gently squeeze the towel with your elbow to prevent it from falling out. Slowly rotate your arm outward, but keep your elbow bent. Stop when you feel a stretch. Hold this position for 30 seconds or as directed. Slowly return to the starting position.    How to exercise with an exercise band:  Your healthcare provider or physical therapist will tell you how much resistance to use.  Shoulder internal rotation:  Tie one end of the  exercise band to a heavy, secure object. Sit in a chair. Place a rolled up towel between your elbow and your side. Bend your elbow to 90°. Gently squeeze the towel with your elbow to prevent it from falling out. Slowly pull the band across your chest. Stop when your hand reaches your opposite arm. Hold this position for as many seconds as directed. Slowly return to the starting position.    Shoulder external rotation:  Hold one end of the exercise band on the side that is not injured. Place a rolled up towel between your elbow and your side. Bend your elbow 90°. Squeeze the towel with your elbow to prevent it from falling out. Grab the end of the band and slowly turn your arm outward, but keep your elbow bent. Stop when you feel a stretch. Hold this position for 30 seconds or as directed. Slowly return to the starting position.    Follow up with your physical therapist as directed:  Write down your questions so you remember to ask them at your visits.  © Copyright [employer] 2023 Information is for End User's use only and may not be sold, redistributed or otherwise used for commercial purposes.  The above information is an  only. It is not intended as medical advice for individual conditions or treatments. Talk to your doctor, nurse or pharmacist before following any medical regimen to see if it is safe and effective for you.

## 2024-06-10 NOTE — ASSESSMENT & PLAN NOTE
03/24 Last TSH 4.937   T4 0.74  05/24 Current TSH 3.00    Current medications: Levothyroxine 50mcg   Continue current medication regimen

## 2024-06-10 NOTE — PROGRESS NOTES
Ambulatory Visit  Name: Marie Lloyd      : 1965      MRN: [id number]  Encounter Provider: SHEEBA Poole  Encounter Date: 6/10/2024   Encounter department: [facility]    Assessment & Plan   1. Follow-up exam  2. Acquired hypothyroidism  Assessment & Plan:   Last TSH 4.937   T4 0.74   Current TSH 3.00    Current medications: Levothyroxine 50mcg   Continue current medication regimen   3. Gastroesophageal reflux disease without esophagitis  Assessment & Plan:  Current medications: Omeprazole 40mg   Continue current medication regimen   4. Elevated BP without diagnosis of hypertension  Assessment & Plan:  BP today 118/80  5. Other hyperlipidemia  Assessment & Plan:   Lipid panel   Cholesterol 264, Triglycerides 198, LDL 42,    Lipid panel   Cholesterol 265, Triglycerides 265, LDL 42,   Current medications: None   New medications: Omega-3   Patient is hesitant to start medication, we will start omega-3 and repeat lipid panel in 3 months   Lifestyle modification: Incorporate regular exercise, avoid sugars and simple carbohydrates, weight loss and choosing healthier fats.   Orders:  -     omega-3-acid ethyl esters (LOVAZA) 1 g capsule; Take 2 capsules (2 g total) by mouth 2 (two) times a day  -     Lipid panel; Future  6. Hyperglycemia  Assessment & Plan:   A1C 5.3    A1C 5.7   7. Chronic rhinitis  Assessment & Plan:  Previously evaluated by ENT and Allergy   2024 CT sinus  Mild paranasal sinus mucosal thickening. There is a small rounded area of mucosal thickening or small retention cyst at the level of the ostium of the ostiomeatal complex on the left partially obstructing the complex.    Current medication: atrovent/azelastine nasal sprays.     8. Closed nondisplaced fracture of greater tuberosity of left humerus with routine healing, subsequent encounter  -     DXA bone density spine hip and pelvis; Future; Expected date:  06/10/2024         History of Present Illness     Follow up on chronic conditions.       Review of Systems   Constitutional:  Negative for activity change, fatigue and fever.   HENT:  Negative for rhinorrhea and trouble swallowing.    Eyes:  Negative for pain and visual disturbance.   Respiratory:  Negative for chest tightness.    Cardiovascular:  Negative for chest pain, palpitations and leg swelling.   Gastrointestinal:  Negative for abdominal pain, constipation, diarrhea, nausea and vomiting.   Genitourinary:  Negative for difficulty urinating, dysuria, hematuria and urgency.   Musculoskeletal:  Negative for arthralgias and back pain.   Skin:  Negative for color change and rash.   Neurological:  Negative for dizziness, seizures and syncope.   Psychiatric/Behavioral:  Negative for dysphoric mood. The patient is not nervous/anxious.    All other systems reviewed and are negative.    Past Medical History:   Diagnosis Date    Allergic rhinitis 12/5/2014    COVID-19 12/11/2020    COVID-19 virus infection 12/14/2020    Sx began on 12/10/20: cough, congestion, CP and chills. Covid positive at [facility] on 12/14/20. CXR showed bibasilar infiltrates.  with similar Sx, seen and tested at [facility] on 12/18/20 and positive. Telemedicine on 12/23/2020-she has a whole lot better and return to work as of 12/28/2020.    Disease of thyroid gland     Elevated blood-pressure reading without diagnosis of hypertension 4/29/2016    Gastroesophageal reflux disease without esophagitis 9/18/2019    H. pylori infection     Microscopic hematuria 5/9/2022    Minimal X1 ; U/S ok  Subsequent U/A ok     Seasonal allergies     Viral infection, unspecified 1/4/2022     Past Surgical History:   Procedure Laterality Date    COLONOSCOPY N/A 2/22/2016    Procedure: COLONOSCOPY;  Surgeon: Pepe Simon MD;  Location: [facility];  Service:      Family History   Problem Relation Age of Onset    Diabetes Mother     Alzheimer's disease Mother  72    Throat cancer Father 78    No Known Problems Sister     No Known Problems Sister     No Known Problems Daughter     No Known Problems Daughter     No Known Problems Maternal Grandmother     No Known Problems Maternal Grandfather     No Known Problems Paternal Grandmother     No Known Problems Paternal Grandfather     Diabetes Brother     No Known Problems Maternal Aunt     No Known Problems Maternal Aunt     No Known Problems Maternal Aunt     No Known Problems Maternal Aunt     No Known Problems Paternal Aunt     No Known Problems Paternal Aunt     No Known Problems Paternal Aunt      Social History     Tobacco Use    Smoking status: Never    Smokeless tobacco: Never    Tobacco comments:     no exposure to passive smoke   Vaping Use    Vaping status: Never Used   Substance and Sexual Activity    Alcohol use: No    Drug use: No    Sexual activity: Not on file     Current Outpatient Medications on File Prior to Visit   Medication Sig    fluticasone (FLONASE) 50 mcg/act nasal spray SPRAY 1 SPRAY INTO EACH NOSTRIL TWICE A DAY    levothyroxine 50 mcg tablet TAKE 1 TABLET BY MOUTH EVERY DAY    omeprazole (PriLOSEC) 40 MG capsule TAKE 1 CAPSULE BY MOUTH EVERY DAY    fexofenadine (ALLEGRA) 180 MG tablet Take 180 mg by mouth daily (Patient not taking: Reported on 2/28/2024)    ipratropium (ATROVENT) 0.06 % nasal spray 2 sprays into each nostril 4 (four) times a day (Patient not taking: Reported on 3/7/2024)    [DISCONTINUED] Azelastine HCl 137 MCG/SPRAY SOLN 1-2 SPRAYS INTO EACH NOSTRIL 2 (TWO) TIMES A DAY AS NEEDED FOR RHINITIS USE IN EACH NOSTRIL AS DIRECTED (Patient not taking: Reported on 3/7/2024)     Allergies   Allergen Reactions    Escitalopram      Other reaction(s): Palpitations  Other reaction(s): Palpitations     Immunization History   Administered Date(s) Administered    COVID-19 MODERNA VACC 0.5 ML IM 03/27/2021, 04/24/2021    INFLUENZA 11/12/2015, 09/30/2016, 09/30/2016, 10/02/2017, 10/02/2017,  "11/20/2018    Influenza, injectable, quadrivalent, preservative free 0.5 mL 11/20/2018    Influenza, recombinant, quadrivalent,injectable, preservative free 09/18/2019, 09/21/2020, 11/09/2021, 11/17/2022    Influenza, seasonal, injectable 11/12/2015     Objective     /80 (BP Location: Left arm, Patient Position: Sitting, Cuff Size: Large)   Pulse 83   Temp 98 °F (36.7 °C) (Temporal)   Resp 18   Ht 5' 2\" (1.575 m)   Wt 70.9 kg (156 lb 3.2 oz)   SpO2 97%   BMI 28.57 kg/m²     Physical Exam  Vitals and nursing note reviewed.   Constitutional:       General: She is not in acute distress.     Appearance: Normal appearance. She is well-developed and normal weight.   HENT:      Head: Normocephalic and atraumatic.      Right Ear: Tympanic membrane, ear canal and external ear normal.      Left Ear: Tympanic membrane, ear canal and external ear normal.      Nose: Nose normal.      Mouth/Throat:      Mouth: Mucous membranes are moist.      Pharynx: Oropharynx is clear. No oropharyngeal exudate.   Eyes:      Extraocular Movements: Extraocular movements intact.      Conjunctiva/sclera: Conjunctivae normal.      Pupils: Pupils are equal, round, and reactive to light.   Cardiovascular:      Rate and Rhythm: Normal rate and regular rhythm.      Pulses: Normal pulses.      Heart sounds: Normal heart sounds. No murmur heard.  Pulmonary:      Effort: Pulmonary effort is normal. No respiratory distress.      Breath sounds: Normal breath sounds.   Abdominal:      General: Bowel sounds are normal.      Palpations: Abdomen is soft.      Tenderness: There is no abdominal tenderness.   Musculoskeletal:         General: No swelling. Normal range of motion.      Cervical back: Normal range of motion and neck supple.   Skin:     General: Skin is warm and dry.      Capillary Refill: Capillary refill takes less than 2 seconds.   Neurological:      General: No focal deficit present.      Mental Status: She is alert and oriented to " person, place, and time. Mental status is at baseline.   Psychiatric:         Mood and Affect: Mood normal.         Behavior: Behavior normal.         Thought Content: Thought content normal.         Judgment: Judgment normal.       Administrative Statements     Patient Instructions   Exercises for Internal and External Shoulder Rotation   WHAT YOU NEED TO KNOW:   Exercises for internal shoulder rotation work the muscles in your chest and front of your shoulder. Exercises for external shoulder rotation work the muscles in the back of your shoOmega-3-Acid Ethyl Esters (By mouth)   Omega-3-Acid Ethyl Esters (oh-MAY-ga-3-AS-id ETH-il ES-ters)  Lowers high triglyceride levels.   Brand Name(s): Gabino Mcdonald   There may be other brand names for this medicine.  When This Medicine Should Not Be Used:   This medicine is not right for everyone. Do not use it if you had an allergic reaction to omega-3-acid ethyl esters.  How to Use This Medicine:   Liquid Filled Capsule  Your doctor will tell you how much medicine to use. Do not use more than directed. Take this medicine at the same time each day.  It is best to take this medicine with food or milk.  Swallow the capsule whole. Do not break, crush, chew, dissolve, or open the capsule. Do not let this medicine come into contact with foam plastic cups (Styrofoam).  Read and follow the patient instructions that come with this medicine. Talk to your doctor or pharmacist if you have any questions.  Missed dose: Take a dose as soon as you remember. If it is almost time for your next dose, wait until then and take a regular dose. Do not take extra medicine to make up for a missed dose.  Store the medicine in a closed container at room temperature, away from heat, moisture, and direct light.  Drugs and Foods to Avoid:   Ask your doctor or pharmacist before using any other medicine, including over-the-counter medicines, vitamins, and herbal products.  Some medicines can affect how  omega-3-acid ethyl esters work. Tell your doctor if you are using any of the following:   Clopidogrel  An NSAID pain or arthritis medicine (such as aspirin, diclofenac, ibuprofen, naproxen)  A blood thinner (such as warfarin)  Warnings While Using This Medicine:   Tell your doctor if you are pregnant or breastfeeding, if you have liver disease, an underactive thyroid, a heart rhythm problem, or an allergy to fish or shellfish.  Your doctor will do lab tests at regular visits to check on the effects of this medicine. Keep all appointments.  Keep all medicine out of the reach of children. Never share your medicine with anyone.  Possible Side Effects While Using This Medicine:   Call your doctor right away if you notice any of these side effects:  Allergic reaction: Itching or hives, swelling in your face or hands, swelling or tingling in your mouth or throat, chest tightness, trouble breathing  Fast or uneven heartbeat, dizziness, fainting  Unusual bleeding or bruising  If you notice these less serious side effects, talk with your doctor:   Burping, stomach upset  Change in taste  If you notice other side effects that you think are caused by this medicine, tell your doctor.   Call your doctor for medical advice about side effects. You may report side effects to FDA at 5-916-FDA-0847  © Copyright [employer] 2023 Information is for End User's use only and may not be sold, redistributed or otherwise used for commercial purposes.  The above information is an  only. It is not intended as medical advice for individual conditions or treatments. Talk to your doctor, nurse or pharmacist before following any medical regimen to see if it is safe and effective for you.  ulder and upper back.  DISCHARGE INSTRUCTIONS:   Call your doctor or physical therapist if:   You have sharp or worsening pain during exercise or at rest.    You have questions or concerns about your shoulder exercises.    Before you exercise:  Warm  up and stretch before you exercise. Walk or ride a stationary bike for 5 to 10 minutes to help you warm up. Stretching helps increase range of motion. It may also decrease muscle soreness and help prevent another injury. Your healthcare provider will tell you which of the following stretches to do:  Crossover arm stretch:  Relax your shoulders. Hold your upper arm with the opposite hand. Pull your arm across your chest until you feel a stretch. Hold the stretch for 30 seconds. Return to the starting position.         Shoulder flexion stretch:  Stand facing a wall. Slowly walk your fingers up the wall until you feel a stretch. Hold the stretch for 30 seconds. Return to the starting position.         Sleeper stretch:  Lie on your injured side on a firm, flat surface. Bend the elbow of your injured arm 90° with your hand facing up. Use your arm that is not injured to slowly push your injured arm down. Stop when you feel a stretch at the back of your injured shoulder. Hold the stretch for 30 seconds. Slowly return to the starting position.       How to exercise with a weight:  Your healthcare provider or physical therapist will tell you how much weight to use.  Shoulder internal rotation:  Sit in a chair. Place a rolled up towel between your elbow and your side. Bend your elbow to 90°. Gently squeeze the towel with your elbow to prevent it from falling out. Hold the weight with your thumb pointing up. Slowly move the weight across your chest. Stop when your hand reaches your opposite arm. Hold this position for as many seconds as directed. Slowly return to the starting position.    Shoulder external rotation:  Lie on your side with your injured shoulder facing up. Bend your elbow 90°. Place a rolled up towel between your elbow and your side. Hold a weight in your hand. Gently squeeze the towel with your elbow to prevent it from falling out. Slowly rotate your arm outward, but keep your elbow bent. Stop when you feel a  stretch. Hold this position for 30 seconds or as directed. Slowly return to the starting position.    How to exercise with an exercise band:  Your healthcare provider or physical therapist will tell you how much resistance to use.  Shoulder internal rotation:  Tie one end of the exercise band to a heavy, secure object. Sit in a chair. Place a rolled up towel between your elbow and your side. Bend your elbow to 90°. Gently squeeze the towel with your elbow to prevent it from falling out. Slowly pull the band across your chest. Stop when your hand reaches your opposite arm. Hold this position for as many seconds as directed. Slowly return to the starting position.    Shoulder external rotation:  Hold one end of the exercise band on the side that is not injured. Place a rolled up towel between your elbow and your side. Bend your elbow 90°. Squeeze the towel with your elbow to prevent it from falling out. Grab the end of the band and slowly turn your arm outward, but keep your elbow bent. Stop when you feel a stretch. Hold this position for 30 seconds or as directed. Slowly return to the starting position.    Follow up with your physical therapist as directed:  Write down your questions so you remember to ask them at your visits.  © Copyright [employer] 2023 Information is for End User's use only and may not be sold, redistributed or otherwise used for commercial purposes.  The above information is an  only. It is not intended as medical advice for individual conditions or treatments. Talk to your doctor, nurse or pharmacist before following any medical regimen to see if it is safe and effective for you.

## 2024-07-03 DIAGNOSIS — E78.49 OTHER HYPERLIPIDEMIA: Chronic | ICD-10-CM

## 2024-07-03 RX ORDER — OMEGA-3-ACID ETHYL ESTERS 1 G/1
2 CAPSULE, LIQUID FILLED ORAL 2 TIMES DAILY
Qty: 360 CAPSULE | Refills: 1 | Status: SHIPPED | OUTPATIENT
Start: 2024-07-03

## 2024-07-16 ENCOUNTER — OFFICE VISIT (OUTPATIENT)
Dept: FAMILY MEDICINE CLINIC | Facility: CLINIC | Age: 59
End: 2024-07-16
Payer: COMMERCIAL

## 2024-07-16 VITALS
HEART RATE: 95 BPM | TEMPERATURE: 97.8 F | DIASTOLIC BLOOD PRESSURE: 76 MMHG | BODY MASS INDEX: 28.71 KG/M2 | HEIGHT: 62 IN | OXYGEN SATURATION: 96 % | WEIGHT: 156 LBS | SYSTOLIC BLOOD PRESSURE: 136 MMHG

## 2024-07-16 DIAGNOSIS — M25.561 ACUTE PAIN OF RIGHT KNEE: Primary | ICD-10-CM

## 2024-07-16 PROCEDURE — 99213 OFFICE O/P EST LOW 20 MIN: CPT

## 2024-07-16 RX ORDER — NAPROXEN 500 MG/1
500 TABLET ORAL 2 TIMES DAILY WITH MEALS
Qty: 60 TABLET | Refills: 0 | Status: SHIPPED | OUTPATIENT
Start: 2024-07-16

## 2024-07-16 RX ORDER — METHYLPREDNISOLONE 4 MG/1
TABLET ORAL
Qty: 21 EACH | Refills: 0 | Status: SHIPPED | OUTPATIENT
Start: 2024-07-16

## 2024-07-16 NOTE — PROGRESS NOTES
Ambulatory Visit  Name: Marie Lloyd      : 1965      MRN: [id number]  Encounter Provider: SHEEBA Poole  Encounter Date: 2024   Encounter department: [facility]    Assessment & Plan   1. Acute pain of right knee  Assessment & Plan:  Incident occurred on 24  Patient was at [facility] and was  wearing high heels  Denies injury or fall  When she arrived home she took off her shoes and felt the pain on the medial side of her right knee  Has been doing therapies at home including rest and ice  Tenderness with palpation along the medial joint line  No pain with active range of motion  New medications: Methylprednisolone and naproxen   Home exercises provided if there is no improvement consider physical therapy  Orders:  -     methylPREDNISolone 4 MG tablet therapy pack; Use as directed on package  -     naproxen (Naprosyn) 500 mg tablet; Take 1 tablet (500 mg total) by mouth 2 (two) times a day with meals         History of Present Illness     Knee Pain   The incident occurred more than 1 week ago (2024). Incident location: at [facility]. There was no injury mechanism (Was wearing high heels, denies fall, but when took shoes off had pain). The pain is present in the right knee. The quality of the pain is described as aching. The pain is mild. The pain has been Intermittent since onset. Associated symptoms include muscle weakness. Pertinent negatives include no inability to bear weight, loss of motion, loss of sensation, numbness or tingling. She reports no foreign bodies present. The symptoms are aggravated by movement and palpation. She has tried ice, rest, heat, elevation and NSAIDs for the symptoms. The treatment provided no relief.     Review of Systems   Constitutional:  Positive for activity change. Negative for chills, fatigue and fever.   HENT:  Negative for congestion, ear pain, rhinorrhea, sore throat and trouble swallowing.    Eyes:  Negative for  pain and visual disturbance.   Respiratory:  Negative for cough, chest tightness and shortness of breath.    Cardiovascular:  Negative for chest pain, palpitations and leg swelling.   Gastrointestinal:  Negative for abdominal pain, constipation, diarrhea, nausea and vomiting.   Genitourinary:  Negative for difficulty urinating, dysuria, hematuria and urgency.   Musculoskeletal:  Positive for arthralgias and gait problem. Negative for back pain and joint swelling.   Skin:  Negative for color change and rash.   Neurological:  Negative for dizziness, tingling, seizures, syncope, numbness and headaches.   Psychiatric/Behavioral:  Negative for dysphoric mood. The patient is not nervous/anxious.    All other systems reviewed and are negative.    Past Medical History:   Diagnosis Date    Allergic rhinitis 12/5/2014    COVID-19 12/11/2020    COVID-19 virus infection 12/14/2020    Sx began on 12/10/20: cough, congestion, CP and chills. Covid positive at [facility] on 12/14/20. CXR showed bibasilar infiltrates.  with similar Sx, seen and tested at [facility] on 12/18/20 and positive. Telemedicine on 12/23/2020-she has a whole lot better and return to work as of 12/28/2020.    Disease of thyroid gland     Elevated blood-pressure reading without diagnosis of hypertension 4/29/2016    Gastroesophageal reflux disease without esophagitis 9/18/2019    H. pylori infection     Microscopic hematuria 5/9/2022    Minimal X1 ; U/S ok  Subsequent U/A ok     Seasonal allergies     Viral infection, unspecified 1/4/2022     Past Surgical History:   Procedure Laterality Date    COLONOSCOPY N/A 2/22/2016    Procedure: COLONOSCOPY;  Surgeon: Pepe Simon MD;  Location: [facility];  Service:      Family History   Problem Relation Age of Onset    Diabetes Mother     Alzheimer's disease Mother 72    Throat cancer Father 78    No Known Problems Sister     No Known Problems Sister     No Known Problems Daughter     No Known Problems  Daughter     No Known Problems Maternal Grandmother     No Known Problems Maternal Grandfather     No Known Problems Paternal Grandmother     No Known Problems Paternal Grandfather     Diabetes Brother     No Known Problems Maternal Aunt     No Known Problems Maternal Aunt     No Known Problems Maternal Aunt     No Known Problems Maternal Aunt     No Known Problems Paternal Aunt     No Known Problems Paternal Aunt     No Known Problems Paternal Aunt      Social History     Tobacco Use    Smoking status: Never     Passive exposure: Never    Smokeless tobacco: Never    Tobacco comments:     no exposure to passive smoke   Vaping Use    Vaping status: Never Used   Substance and Sexual Activity    Alcohol use: No    Drug use: No    Sexual activity: Yes     Partners: Male     Current Outpatient Medications on File Prior to Visit   Medication Sig    fluticasone (FLONASE) 50 mcg/act nasal spray SPRAY 1 SPRAY INTO EACH NOSTRIL TWICE A DAY    levothyroxine 50 mcg tablet TAKE 1 TABLET BY MOUTH EVERY DAY    omega-3-acid ethyl esters (LOVAZA) 1 g capsule TAKE 2 CAPSULES BY MOUTH 2 TIMES A DAY.    omeprazole (PriLOSEC) 40 MG capsule TAKE 1 CAPSULE BY MOUTH EVERY DAY    fexofenadine (ALLEGRA) 180 MG tablet Take 180 mg by mouth daily (Patient not taking: Reported on 2/28/2024)    ipratropium (ATROVENT) 0.06 % nasal spray 2 sprays into each nostril 4 (four) times a day (Patient not taking: Reported on 3/7/2024)     Allergies   Allergen Reactions    Escitalopram      Other reaction(s): Palpitations  Other reaction(s): Palpitations     Immunization History   Administered Date(s) Administered    COVID-19 MODERNA VACC 0.5 ML IM 03/27/2021, 04/24/2021    INFLUENZA 11/12/2015, 09/30/2016, 09/30/2016, 10/02/2017, 10/02/2017, 11/20/2018    Influenza, injectable, quadrivalent, preservative free 0.5 mL 11/20/2018    Influenza, recombinant, quadrivalent,injectable, preservative free 09/18/2019, 09/21/2020, 11/09/2021, 11/17/2022    Influenza,  "seasonal, injectable 11/12/2015     Objective     /76 (BP Location: Left arm, Patient Position: Sitting, Cuff Size: Adult)   Pulse 95   Temp 97.8 °F (36.6 °C)   Ht 5' 2\" (1.575 m)   Wt 70.8 kg (156 lb)   SpO2 96%   BMI 28.53 kg/m²     Physical Exam  Vitals and nursing note reviewed.   Constitutional:       General: She is not in acute distress.     Appearance: Normal appearance. She is well-developed and normal weight.   HENT:      Head: Normocephalic and atraumatic.      Right Ear: Tympanic membrane, ear canal and external ear normal. There is no impacted cerumen.      Left Ear: Tympanic membrane, ear canal and external ear normal. There is no impacted cerumen.      Nose: Nose normal.      Mouth/Throat:      Mouth: Mucous membranes are moist.      Pharynx: Oropharynx is clear.   Eyes:      Extraocular Movements: Extraocular movements intact.      Conjunctiva/sclera: Conjunctivae normal.      Pupils: Pupils are equal, round, and reactive to light.   Cardiovascular:      Rate and Rhythm: Normal rate and regular rhythm.      Pulses: Normal pulses.      Heart sounds: Normal heart sounds. No murmur heard.  Pulmonary:      Effort: Pulmonary effort is normal. No respiratory distress.      Breath sounds: Normal breath sounds.   Abdominal:      General: Bowel sounds are normal.      Palpations: Abdomen is soft.      Tenderness: There is no abdominal tenderness.   Musculoskeletal:         General: Normal range of motion.      Cervical back: Normal range of motion and neck supple.      Right knee: No swelling, bony tenderness or crepitus. Normal range of motion. Tenderness present over the medial joint line. No LCL laxity, MCL laxity, ACL laxity or PCL laxity.      Left knee: Normal.      Right lower leg: No edema.      Left lower leg: No edema.   Skin:     General: Skin is warm and dry.      Capillary Refill: Capillary refill takes less than 2 seconds.   Neurological:      General: No focal deficit present.      " Mental Status: She is alert and oriented to person, place, and time. Mental status is at baseline.   Psychiatric:         Mood and Affect: Mood normal.         Behavior: Behavior normal.         Thought Content: Thought content normal.         Judgment: Judgment normal.       Administrative Statements   I have spent a total time of 20 minutes in caring for this patient on the day of the visit/encounter including Diagnostic results, Prognosis, Risks and benefits of tx options, Instructions for management, Patient and family education, Importance of tx compliance, Risk factor reductions, Impressions, Counseling / Coordination of care, Documenting in the medical record, Reviewing / ordering tests, medicine, procedures  , and Obtaining or reviewing history  .

## 2024-07-16 NOTE — PATIENT INSTRUCTIONS
Patient Education     Anterior Cruciate Ligament Exercises Standing   About this topic   A ligament is a tough band of tissue that connects one bone to another. The anterior cruciate ligament, also called the ACL, connects the thigh bone to the shin bone. It works with another ligament to keep the knee from moving too far forward or backward. When an ACL is injured or torn, the knee might feel unstable. This is often described as feeling like your knee “gives out”. Some people will need surgery to fix this. Other people can make the problem better with exercises and avoiding certain activities. Either way, exercises are important for the treatment of an ACL injury.  General   Before starting with a program, ask your doctor if you are healthy enough to do these exercises. Your doctor may have you work with a  or physical therapist to make a safe exercise program to meet your needs.  Strengthening Exercises   Strengthening exercises keep your muscles firm and strong. Stand while doing these exercises. Be sure to use good posture. Start by repeating each exercise 2 to 3 times. Work up to doing each exercise 10 times. Try to do the exercises 2 to 3 times each day. Do all exercises slowly  Mini-squats ? Stand up straight in front of a counter and hold on with your hands. Have your feet spread about a foot apart. Bend your knees while keeping your back straight. Return to straight standing position. At first, start by just having a slight bend at the knee. To make it harder, bend your knees deeper or hold the position longer. Do not go any lower than 90 degrees. Later, you can also do this exercise standing only on your operated leg.  Wall slides ? Stand with your back against a wall with your feet about a foot away from the wall and shoulder width apart. Slide down the wall bending the knees slightly, about half the way to a sitting position. Then, straighten the knees until you are back in the starting position.    Backward knee bends ? Hold onto a counter. Bend the knee back by moving only the lower leg. Stand up tall and do not bend at the waist. Keep your thighs lined up and do not let your thigh come forward. Repeat on the other leg.  Heel raises at counter ? Hold on to a counter. Lift your heels up and rise up onto your toes. Lower yourself back down.  Mini-lunges ? Gently, take a small step forward with your sore leg. Lean forward bending your knee slightly. Step backwards and return to the starting position. Later, you can take bigger steps and bend your knee more.  Knee exercises with band ? Make a loop in the exercise band by tying a knot. Place the knot in the door at the height of your knee and shut the door. Step in the loop with your sore leg and bring it up to the back of your knee. Start with your knee slightly bent and the tubing pulled straight. Gently, straighten your knee. Bring it back to the starting position.               What will the results be?   Less pain and swelling  Better flexibility  Stronger muscles  Less chance for more injury  Faster return to activities  Easier to walk and do other activities  Helpful tips   Stay active and work out to keep your muscles strong and flexible.  Keep a healthy weight so there is not extra stress on your joints. Eat a healthy diet to keep your muscles healthy.  Be sure you do not hold your breath when exercising. This can raise your blood pressure. If you tend to hold your breath, try counting out loud when exercising. If any exercise bothers you, stop right away.  Try walking or cycling at an easy pace for a few minutes to warm up your muscles. Do this again after exercising.  After exercising, it is a good idea to use ice. Place an ice pack or a bag of frozen peas wrapped in a towel over the painful part. Never put ice right on the skin. Do not leave the ice on more than 10 to 15 minutes at a time. Ice after activity may help decrease pain and swelling. Never  ice before stretching.  Doing exercises before a meal may be a good way to get into a routine.  Exercise may be slightly uncomfortable, but you should not have sharp pains. If you do get sharp pains, stop what you are doing. If the sharp pains continue, call your doctor.  Last Reviewed Date   2021-02-09  Consumer Information Use and Disclaimer   This generalized information is a limited summary of diagnosis, treatment, and/or medication information. It is not meant to be comprehensive and should be used as a tool to help the user understand and/or assess potential diagnostic and treatment options. It does NOT include all information about conditions, treatments, medications, side effects, or risks that may apply to a specific patient. It is not intended to be medical advice or a substitute for the medical advice, diagnosis, or treatment of a health care provider based on the health care provider's examination and assessment of a patient’s specific and unique circumstances. Patients must speak with a health care provider for complete information about their health, medical questions, and treatment options, including any risks or benefits regarding use of medications. This information does not endorse any treatments or medications as safe, effective, or approved for treating a specific patient. [employer] and its affiliates disclaim any warranty or liability relating to this information or the use thereof. The use of this information is governed by the Terms of Use, available at https://www.[employer].com/en/know/clinical-effectiveness-terms   Copyright   Copyright © 2024 [employer], [employer] and its affiliates and/or licensors. All rights reserved.

## 2024-07-16 NOTE — ASSESSMENT & PLAN NOTE
Incident occurred on 06/30/24  Patient was at [employer] and was  wearing high heels  Denies injury or fall  When she arrived home she took off her shoes and felt the pain on the medial side of her right knee  Has been doing therapies at home including rest and ice  Tenderness with palpation along the medial joint line  No pain with active range of motion  New medications: Methylprednisolone and naproxen   Home exercises provided if there is no improvement consider physical therapy

## 2024-07-17 ENCOUNTER — TELEPHONE (OUTPATIENT)
Dept: FAMILY MEDICINE CLINIC | Facility: CLINIC | Age: 59
End: 2024-07-17

## 2024-07-17 ENCOUNTER — TELEPHONE (OUTPATIENT)
Age: 59
End: 2024-07-17

## 2024-07-17 NOTE — TELEPHONE ENCOUNTER
----- Message from Jessica Jackson This is a normal with the steroid, please continue medication

## 2024-07-17 NOTE — TELEPHONE ENCOUNTER
Patient called stating she had been seen yesterday for knee pain and given two scripts, Naproxen and Methylprednisolone . Patient states she took both yesterday but now today she's experiencing elevated blood pressure and is concerned about continuing medication . Please advise and return patient call .

## 2024-07-18 NOTE — TELEPHONE ENCOUNTER
Patient calling back. Reports she is unsure about continuing steroid. BP reading yesterday after taking steroid 180/103 and c/o palpitations. Warm transferred pt to [facility] at [facility]CP [place] Nurse triage for further assistance with questions.

## 2024-07-18 NOTE — TELEPHONE ENCOUNTER
Patient called back about her elevated BP on steroid.  Yesterday morning at 3 am she woke up sweaty and her   . She does not remember what the diastolic number was.  She was also having palpitations.  She did not take the Naproxen or the methylprednisolone yesterday.  /88 today.  Patient states that she does not want continue taking the steroid due to this.  She will continue to take the Naproxen.    Please follow up with patient to advise.

## 2024-07-25 ENCOUNTER — OFFICE VISIT (OUTPATIENT)
Dept: FAMILY MEDICINE CLINIC | Facility: CLINIC | Age: 59
End: 2024-07-25
Payer: COMMERCIAL

## 2024-07-25 VITALS
WEIGHT: 157 LBS | SYSTOLIC BLOOD PRESSURE: 132 MMHG | HEIGHT: 62 IN | OXYGEN SATURATION: 96 % | HEART RATE: 105 BPM | TEMPERATURE: 97.9 F | DIASTOLIC BLOOD PRESSURE: 80 MMHG | BODY MASS INDEX: 28.89 KG/M2

## 2024-07-25 DIAGNOSIS — M25.561 ACUTE PAIN OF RIGHT KNEE: Primary | ICD-10-CM

## 2024-07-25 PROCEDURE — 99213 OFFICE O/P EST LOW 20 MIN: CPT

## 2024-07-25 NOTE — ASSESSMENT & PLAN NOTE
Incident occurred on 06/30/24  Patient was at [employer] and was  wearing high heels  Denies injury or fall  When she arrived home she took off her shoes and felt the pain on the medial side of her right knee  Has been doing therapies at home including rest and ice  Tenderness with palpation along the medial joint line  No pain with active range of motion  At last visit patient was started on methylprednisolone and naproxen   Elevated BP readings with methylprednisolone and did not complete course  Has been using naproxen as needed with mild relief  X-ray ordered  Referral placed to orthopedics

## 2024-07-26 NOTE — PROGRESS NOTES
Ambulatory Visit  Name: Marie Lloyd      : 1965      MRN: [id number]  Encounter Provider: SHEEBA Poole  Encounter Date: 2024   Encounter department: [facility]    Assessment & Plan   1. Acute pain of right knee  Assessment & Plan:  Incident occurred on 24  Patient was at [facility] and was  wearing high heels  Denies injury or fall  When she arrived home she took off her shoes and felt the pain on the medial side of her right knee  Has been doing therapies at home including rest and ice  Tenderness with palpation along the medial joint line  No pain with active range of motion  At last visit patient was started on methylprednisolone and naproxen   Elevated BP readings with methylprednisolone and did not complete course  Has been using naproxen as needed with mild relief  X-ray ordered  Referral placed to orthopedics  Orders:  -     Ambulatory Referral to Orthopedic Surgery; Future  -     XR knee 4+ vw right injury; Future; Expected date: 2024         History of Present Illness     Knee Pain   The incident occurred more than 1 week ago. Incident location: [facility]. The injury mechanism was a twisting injury. The pain is present in the right knee. The quality of the pain is described as aching. The pain is mild. The pain has been Fluctuating since onset. Associated symptoms include an inability to bear weight and muscle weakness. She reports no foreign bodies present. The symptoms are aggravated by movement, palpation and weight bearing. She has tried NSAIDs for the symptoms. The treatment provided no relief.     Review of Systems   Constitutional:  Negative for activity change, chills, fatigue and fever.   HENT:  Negative for congestion, ear pain, rhinorrhea, sore throat and trouble swallowing.    Eyes:  Negative for pain and visual disturbance.   Respiratory:  Negative for cough, chest tightness and shortness of breath.    Cardiovascular:  Negative for  chest pain, palpitations and leg swelling.   Gastrointestinal:  Negative for abdominal pain, constipation, diarrhea, nausea and vomiting.   Genitourinary:  Negative for difficulty urinating, dysuria, hematuria and urgency.   Musculoskeletal:  Negative for arthralgias and back pain.   Skin:  Negative for color change and rash.   Neurological:  Negative for dizziness, seizures, syncope and headaches.   Psychiatric/Behavioral:  Negative for dysphoric mood. The patient is not nervous/anxious.    All other systems reviewed and are negative.    Past Medical History:   Diagnosis Date    Allergic rhinitis 12/5/2014    COVID-19 12/11/2020    COVID-19 virus infection 12/14/2020    Sx began on 12/10/20: cough, congestion, CP and chills. Covid positive at [facility] on 12/14/20. CXR showed bibasilar infiltrates.  with similar Sx, seen and tested at [facility] on 12/18/20 and positive. Telemedicine on 12/23/2020-she has a whole lot better and return to work as of 12/28/2020.    Disease of thyroid gland     Elevated blood-pressure reading without diagnosis of hypertension 4/29/2016    Gastroesophageal reflux disease without esophagitis 9/18/2019    H. pylori infection     Microscopic hematuria 5/9/2022    Minimal X1 ; U/S ok  Subsequent U/A ok     Seasonal allergies     Viral infection, unspecified 1/4/2022     Past Surgical History:   Procedure Laterality Date    COLONOSCOPY N/A 2/22/2016    Procedure: COLONOSCOPY;  Surgeon: Pepe Simon MD;  Location: [facility];  Service:      Family History   Problem Relation Age of Onset    Diabetes Mother     Alzheimer's disease Mother 72    Throat cancer Father 78    No Known Problems Sister     No Known Problems Sister     No Known Problems Daughter     No Known Problems Daughter     No Known Problems Maternal Grandmother     No Known Problems Maternal Grandfather     No Known Problems Paternal Grandmother     No Known Problems Paternal Grandfather     Diabetes Brother     No  Known Problems Maternal Aunt     No Known Problems Maternal Aunt     No Known Problems Maternal Aunt     No Known Problems Maternal Aunt     No Known Problems Paternal Aunt     No Known Problems Paternal Aunt     No Known Problems Paternal Aunt      Social History     Tobacco Use    Smoking status: Never     Passive exposure: Never    Smokeless tobacco: Never    Tobacco comments:     no exposure to passive smoke   Vaping Use    Vaping status: Never Used   Substance and Sexual Activity    Alcohol use: No    Drug use: No    Sexual activity: Yes     Partners: Male     Current Outpatient Medications on File Prior to Visit   Medication Sig    fluticasone (FLONASE) 50 mcg/act nasal spray SPRAY 1 SPRAY INTO EACH NOSTRIL TWICE A DAY    levothyroxine 50 mcg tablet TAKE 1 TABLET BY MOUTH EVERY DAY    methylPREDNISolone 4 MG tablet therapy pack Use as directed on package    naproxen (Naprosyn) 500 mg tablet Take 1 tablet (500 mg total) by mouth 2 (two) times a day with meals    omega-3-acid ethyl esters (LOVAZA) 1 g capsule TAKE 2 CAPSULES BY MOUTH 2 TIMES A DAY.    omeprazole (PriLOSEC) 40 MG capsule TAKE 1 CAPSULE BY MOUTH EVERY DAY    fexofenadine (ALLEGRA) 180 MG tablet Take 180 mg by mouth daily (Patient not taking: Reported on 2/28/2024)    ipratropium (ATROVENT) 0.06 % nasal spray 2 sprays into each nostril 4 (four) times a day (Patient not taking: Reported on 3/7/2024)     Allergies   Allergen Reactions    Escitalopram      Other reaction(s): Palpitations  Other reaction(s): Palpitations     Immunization History   Administered Date(s) Administered    COVID-19 MODERNA VACC 0.5 ML IM 03/27/2021, 04/24/2021    INFLUENZA 11/12/2015, 09/30/2016, 09/30/2016, 10/02/2017, 10/02/2017, 11/20/2018    Influenza, injectable, quadrivalent, preservative free 0.5 mL 11/20/2018    Influenza, recombinant, quadrivalent,injectable, preservative free 09/18/2019, 09/21/2020, 11/09/2021, 11/17/2022    Influenza, seasonal, injectable  "11/12/2015     Objective     /80 (BP Location: Left arm, Patient Position: Sitting, Cuff Size: Adult)   Pulse 105   Temp 97.9 °F (36.6 °C)   Ht 5' 2\" (1.575 m)   Wt 71.2 kg (157 lb)   SpO2 96%   BMI 28.72 kg/m²     Physical Exam  Vitals and nursing note reviewed.   Constitutional:       General: She is not in acute distress.     Appearance: Normal appearance. She is well-developed and normal weight.   HENT:      Head: Normocephalic and atraumatic.      Right Ear: Tympanic membrane, ear canal and external ear normal. There is no impacted cerumen.      Left Ear: Tympanic membrane, ear canal and external ear normal. There is no impacted cerumen.      Nose: Nose normal.      Mouth/Throat:      Mouth: Mucous membranes are moist.      Pharynx: Oropharynx is clear.   Eyes:      Extraocular Movements: Extraocular movements intact.      Conjunctiva/sclera: Conjunctivae normal.      Pupils: Pupils are equal, round, and reactive to light.   Cardiovascular:      Rate and Rhythm: Normal rate and regular rhythm.      Pulses: Normal pulses.      Heart sounds: Normal heart sounds. No murmur heard.  Pulmonary:      Effort: Pulmonary effort is normal. No respiratory distress.      Breath sounds: Normal breath sounds.   Abdominal:      General: Bowel sounds are normal.      Palpations: Abdomen is soft.      Tenderness: There is no abdominal tenderness.   Musculoskeletal:         General: Normal range of motion.      Cervical back: Normal range of motion and neck supple.      Right lower leg: No edema.      Left lower leg: No edema.   Skin:     General: Skin is warm and dry.      Capillary Refill: Capillary refill takes less than 2 seconds.   Neurological:      General: No focal deficit present.      Mental Status: She is alert and oriented to person, place, and time. Mental status is at baseline.   Psychiatric:         Mood and Affect: Mood normal.         Behavior: Behavior normal.         Thought Content: Thought content " normal.         Judgment: Judgment normal.       Administrative Statements   I have spent a total time of 20 minutes in caring for this patient on the day of the visit/encounter including Diagnostic results, Prognosis, Risks and benefits of tx options, Instructions for management, Patient and family education, Importance of tx compliance, Risk factor reductions, Impressions, Counseling / Coordination of care, Documenting in the medical record, Reviewing / ordering tests, medicine, procedures  , and Obtaining or reviewing history  .

## 2024-08-03 ENCOUNTER — OFFICE VISIT (OUTPATIENT)
Dept: OBGYN CLINIC | Facility: MEDICAL CENTER | Age: 59
End: 2024-08-03
Payer: COMMERCIAL

## 2024-08-03 ENCOUNTER — APPOINTMENT (OUTPATIENT)
Dept: RADIOLOGY | Facility: MEDICAL CENTER | Age: 59
End: 2024-08-03
Payer: COMMERCIAL

## 2024-08-03 VITALS
DIASTOLIC BLOOD PRESSURE: 78 MMHG | WEIGHT: 155 LBS | HEIGHT: 62 IN | HEART RATE: 96 BPM | BODY MASS INDEX: 28.52 KG/M2 | SYSTOLIC BLOOD PRESSURE: 115 MMHG

## 2024-08-03 DIAGNOSIS — M25.561 ACUTE PAIN OF RIGHT KNEE: ICD-10-CM

## 2024-08-03 PROCEDURE — 99203 OFFICE O/P NEW LOW 30 MIN: CPT | Performed by: EMERGENCY MEDICINE

## 2024-08-03 PROCEDURE — 73564 X-RAY EXAM KNEE 4 OR MORE: CPT

## 2024-08-03 RX ORDER — MELOXICAM 7.5 MG/1
TABLET ORAL
Qty: 30 TABLET | Refills: 1 | Status: SHIPPED | OUTPATIENT
Start: 2024-08-03

## 2024-08-03 NOTE — PATIENT INSTRUCTIONS
While taking Mobic (meloxicam) do not take any other NSAIDs such as Advil, Motrin, ibuprofen, Celebrex, diclofenac, naproxen or Aleve.  However you may take Tylenol (acetaminophen).  You may also take Tylenol 500mg every 4-6 hours as needed OR max 1,000mg per dose up to 3 times per day for a total of 3,000mg per day    OR/THEN    While taking the oral steroid Medrol Dosepak, do not take any NSAIDs such as Advil, Motrin, ibuprofen, Motrin, Aleve, naproxen, Celebrex or Meloxicam.  You can restart the NSAIDs after you finish the steroids.    However you may take Tylenol 500mg every 4-6 hours as needed OR max 1,000mg per dose up to 3 times per day for a total of 3,000mg per day  While taking oral steroids, you may experience mild side effects such as feeling jittery or flushing.  Please call if your side effects are significant or you have any questions.

## 2024-08-03 NOTE — PROGRESS NOTES
Assessment/Plan:    Diagnoses and all orders for this visit:    Acute pain of right knee  -     Ambulatory Referral to Orthopedic Surgery  -     XR knee 4+ vw right injury; Future  -     meloxicam (MOBIC) 7.5 mg tablet; 1-2 tabs PO daily PRN Pain    Reviewed x-rays of the right knee which are within normal limits however I suspect patient's pain is arising from degenerative changes.  We did discuss taking the Medrol Dosepak however this time not taking it all at once in the afternoon but spacing it throughout the day as instructed.  I have also provided her a prescription for meloxicam which she could try instead of the Medrol Dosepak instructions have been provided.  To consider CSI if no improvement    Return in about 4 weeks (around 8/31/2024).      Subjective:   Patient ID: Marie Lloyd is a 58 y.o. female.    NP presents for Right medial knee pain x 3-4 weeks, no injury but started after wearing heels to [employer], has been icing and using knee sleeve.  No mechanical symptoms  She was unable to tolerate oral steroids, no benefit from Naproxen   She did have a fall 2 months ago         Review of Systems    The following portions of the patient's chart were reviewed and updated as appropriate:   Allergy:    Allergies   Allergen Reactions    Escitalopram      Other reaction(s): Palpitations  Other reaction(s): Palpitations       Medications:    Current Outpatient Medications:     fexofenadine (ALLEGRA) 180 MG tablet, Take 180 mg by mouth daily prn, Disp: , Rfl:     fluticasone (FLONASE) 50 mcg/act nasal spray, SPRAY 1 SPRAY INTO EACH NOSTRIL TWICE A DAY, Disp: 48 mL, Rfl: 2    levothyroxine 50 mcg tablet, TAKE 1 TABLET BY MOUTH EVERY DAY, Disp: 90 tablet, Rfl: 1    meloxicam (MOBIC) 7.5 mg tablet, 1-2 tabs PO daily PRN Pain, Disp: 30 tablet, Rfl: 1    omega-3-acid ethyl esters (LOVAZA) 1 g capsule, TAKE 2 CAPSULES BY MOUTH 2 TIMES A DAY., Disp: 360 capsule, Rfl: 1    omeprazole (PriLOSEC) 40 MG capsule, TAKE 1  "CAPSULE BY MOUTH EVERY DAY, Disp: 90 capsule, Rfl: 2    ipratropium (ATROVENT) 0.06 % nasal spray, 2 sprays into each nostril 4 (four) times a day (Patient not taking: Reported on 3/7/2024), Disp: 15 mL, Rfl: 5    methylPREDNISolone 4 MG tablet therapy pack, Use as directed on package (Patient not taking: Reported on 8/3/2024), Disp: 21 each, Rfl: 0    Patient Active Problem List   Diagnosis    Chronic rhinitis    Elevated BP without diagnosis of hypertension    Gastroesophageal reflux disease without esophagitis    Acquired hypothyroidism    Other hyperlipidemia    Hyperglycemia    Acute pain of right knee       Objective:  /78   Pulse 96   Ht 5' 2\" (1.575 m)   Wt 70.3 kg (155 lb)   BMI 28.35 kg/m²     Right Knee Exam     Tenderness   The patient is experiencing tenderness in the medial joint line.    Range of Motion   The patient has normal right knee ROM.    Tests   Keon:  Medial - negative    Valgus: negative    Other   Erythema: absent  Sensation: normal  Swelling: mild  Effusion: no effusion present          Observations     Right Knee   Negative for effusion.       Physical Exam  Musculoskeletal:      Right knee: No effusion.      Instability Tests: Medial Keon test negative.           Neurologic Exam    Procedures    I have personally reviewed pertinent films in PACS and my interpretation is Xray Right knee: No acute fracture or dislocation no significant degenerative changes osseous lesions or effusion            Past Medical History:   Diagnosis Date    Allergic rhinitis 12/5/2014    COVID-19 12/11/2020    COVID-19 virus infection 12/14/2020    Sx began on 12/10/20: cough, congestion, CP and chills. Covid positive at [facility] on 12/14/20. CXR showed bibasilar infiltrates.  with similar Sx, seen and tested at [facility] on 12/18/20 and positive. Telemedicine on 12/23/2020-she has a whole lot better and return to work as of 12/28/2020.    Disease of thyroid gland     Elevated " blood-pressure reading without diagnosis of hypertension 4/29/2016    Gastroesophageal reflux disease without esophagitis 9/18/2019    H. pylori infection     Microscopic hematuria 5/9/2022    Minimal X1 ; U/S ok  Subsequent U/A ok     Seasonal allergies     Viral infection, unspecified 1/4/2022       Past Surgical History:   Procedure Laterality Date    COLONOSCOPY N/A 2/22/2016    Procedure: COLONOSCOPY;  Surgeon: Pepe Simon MD;  Location: [facility];  Service:        Social History     Socioeconomic History    Marital status: /Civil Union     Spouse name: Not on file    Number of children: Not on file    Years of education: Not on file    Highest education level: Not on file   Occupational History    Not on file   Tobacco Use    Smoking status: Never     Passive exposure: Never    Smokeless tobacco: Never    Tobacco comments:     no exposure to passive smoke   Vaping Use    Vaping status: Never Used   Substance and Sexual Activity    Alcohol use: No    Drug use: No    Sexual activity: Yes     Partners: Male   Other Topics Concern    Not on file   Social History Narrative    Do you have pets? none Is pet allowed in bedroom?NA    Are you a smoker? Never    Does anyone smoke in your home? No       Do you live with smokers? No    [place] frequently? No   How many times a year? N/A      Social Determinants of Health     Financial Resource Strain: Not on file   Food Insecurity: Not on file   Transportation Needs: Not on file   Physical Activity: Not on file   Stress: Not on file   Social Connections: Not on file   Intimate Partner Violence: Not on file   Housing Stability: Not on file       Family History   Problem Relation Age of Onset    Diabetes Mother     Alzheimer's disease Mother 72    Throat cancer Father 78    No Known Problems Sister     No Known Problems Sister     No Known Problems Daughter     No Known Problems Daughter     No Known Problems Maternal Grandmother     No Known Problems Maternal  Grandfather     No Known Problems Paternal Grandmother     No Known Problems Paternal Grandfather     Diabetes Brother     No Known Problems Maternal Aunt     No Known Problems Maternal Aunt     No Known Problems Maternal Aunt     No Known Problems Maternal Aunt     No Known Problems Paternal Aunt     No Known Problems Paternal Aunt     No Known Problems Paternal Aunt

## 2024-08-09 ENCOUNTER — TELEPHONE (OUTPATIENT)
Age: 59
End: 2024-08-09

## 2024-08-09 ENCOUNTER — OFFICE VISIT (OUTPATIENT)
Dept: URGENT CARE | Facility: MEDICAL CENTER | Age: 59
End: 2024-08-09
Payer: COMMERCIAL

## 2024-08-09 VITALS
BODY MASS INDEX: 29.6 KG/M2 | DIASTOLIC BLOOD PRESSURE: 85 MMHG | OXYGEN SATURATION: 97 % | SYSTOLIC BLOOD PRESSURE: 124 MMHG | WEIGHT: 156.8 LBS | TEMPERATURE: 97.8 F | HEART RATE: 102 BPM | RESPIRATION RATE: 20 BRPM | HEIGHT: 61 IN

## 2024-08-09 DIAGNOSIS — R05.1 ACUTE COUGH: Primary | ICD-10-CM

## 2024-08-09 PROCEDURE — G0382 LEV 3 HOSP TYPE B ED VISIT: HCPCS | Performed by: NURSE PRACTITIONER

## 2024-08-09 PROCEDURE — S9083 URGENT CARE CENTER GLOBAL: HCPCS | Performed by: NURSE PRACTITIONER

## 2024-08-09 RX ORDER — BENZONATATE 200 MG/1
200 CAPSULE ORAL 3 TIMES DAILY PRN
Qty: 20 CAPSULE | Refills: 0 | Status: SHIPPED | OUTPATIENT
Start: 2024-08-09

## 2024-08-09 RX ORDER — ALBUTEROL SULFATE 90 UG/1
2 AEROSOL, METERED RESPIRATORY (INHALATION) EVERY 6 HOURS PRN
Qty: 8.5 G | Refills: 0 | Status: SHIPPED | OUTPATIENT
Start: 2024-08-09

## 2024-08-09 NOTE — PROGRESS NOTES
[facility]        NAME: Marie Lloyd is a 58 y.o. female  : 1965    MRN: [id number]  DATE: 2024  TIME: 2:20 PM    Assessment and Plan   Acute cough [R05.1]  1. Acute cough  benzonatate (TESSALON) 200 MG capsule    albuterol (ProAir HFA) 90 mcg/act inhaler        Patient in NAD and VSS upon exam. Discussed results of exam in office, will treat cough with tessalon and albuterol inhaler. Patient declined prednisone. CXR not indicated at this time. Discussed supportive care and return precautions. Note for work/school given as needed.      Patient Instructions       Follow up with PCP in 3-5 days.  Proceed to  ER if symptoms worsen.    If tests have been performed at [facility], our office will contact you with results if changes need to be made to the care plan discussed with you at the visit.  [facility]    Chief Complaint     Chief Complaint   Patient presents with    Cough     Started 8 days ago, getting worse last 3-4 days          History of Present Illness       Started: 8 days, worsening over past 3-4 days  Positive: cough productive at times but more dry, congestion, coughing fits  Negative: ST, fever  Denies CP, SOB, trouble breathing  Treatment: took cough medicine today, honey        Review of Systems   Review of Systems   HENT:  Positive for congestion.    Respiratory:  Positive for cough.    All other systems reviewed and are negative.        Current Medications       Current Outpatient Medications:     albuterol (ProAir HFA) 90 mcg/act inhaler, Inhale 2 puffs every 6 (six) hours as needed (coughing fits), Disp: 8.5 g, Rfl: 0    benzonatate (TESSALON) 200 MG capsule, Take 1 capsule (200 mg total) by mouth 3 (three) times a day as needed for cough, Disp: 20 capsule, Rfl: 0    fexofenadine (ALLEGRA) 180 MG tablet, Take 180 mg by mouth daily prn, Disp: , Rfl:     fluticasone (FLONASE) 50 mcg/act nasal spray, SPRAY 1 SPRAY INTO EACH  NOSTRIL TWICE A DAY, Disp: 48 mL, Rfl: 2    levothyroxine 50 mcg tablet, TAKE 1 TABLET BY MOUTH EVERY DAY, Disp: 90 tablet, Rfl: 1    meloxicam (MOBIC) 7.5 mg tablet, 1-2 tabs PO daily PRN Pain, Disp: 30 tablet, Rfl: 1    omega-3-acid ethyl esters (LOVAZA) 1 g capsule, TAKE 2 CAPSULES BY MOUTH 2 TIMES A DAY., Disp: 360 capsule, Rfl: 1    omeprazole (PriLOSEC) 40 MG capsule, TAKE 1 CAPSULE BY MOUTH EVERY DAY, Disp: 90 capsule, Rfl: 2    Current Allergies     Allergies as of 08/09/2024 - Reviewed 08/09/2024   Allergen Reaction Noted    Escitalopram  05/14/2018            The following portions of the patient's history were reviewed and updated as appropriate: allergies, current medications, past family history, past medical history, past social history, past surgical history and problem list.     Past Medical History:   Diagnosis Date    Allergic rhinitis 12/5/2014    COVID-19 12/11/2020    COVID-19 virus infection 12/14/2020    Sx began on 12/10/20: cough, congestion, CP and chills. Covid positive at [facility] on 12/14/20. CXR showed bibasilar infiltrates.  with similar Sx, seen and tested at [facility] on 12/18/20 and positive. Telemedicine on 12/23/2020-she has a whole lot better and return to work as of 12/28/2020.    Disease of thyroid gland     Elevated blood-pressure reading without diagnosis of hypertension 4/29/2016    Gastroesophageal reflux disease without esophagitis 9/18/2019    H. pylori infection     Microscopic hematuria 5/9/2022    Minimal X1 ; U/S ok  Subsequent U/A ok     Seasonal allergies     Viral infection, unspecified 1/4/2022       Past Surgical History:   Procedure Laterality Date    COLONOSCOPY N/A 2/22/2016    Procedure: COLONOSCOPY;  Surgeon: Pepe Simon MD;  Location: [facility];  Service:        Family History   Problem Relation Age of Onset    Diabetes Mother     Alzheimer's disease Mother 72    Throat cancer Father 78    No Known Problems Sister     No Known Problems Sister  "    No Known Problems Daughter     No Known Problems Daughter     No Known Problems Maternal Grandmother     No Known Problems Maternal Grandfather     No Known Problems Paternal Grandmother     No Known Problems Paternal Grandfather     Diabetes Brother     No Known Problems Maternal Aunt     No Known Problems Maternal Aunt     No Known Problems Maternal Aunt     No Known Problems Maternal Aunt     No Known Problems Paternal Aunt     No Known Problems Paternal Aunt     No Known Problems Paternal Aunt          Medications have been verified.        Objective   /85   Pulse 102   Temp 97.8 °F (36.6 °C)   Resp 20   Ht 5' 1\" (1.549 m)   Wt 71.1 kg (156 lb 12.8 oz)   SpO2 97%   BMI 29.63 kg/m²   No LMP recorded. Patient is postmenopausal.       Physical Exam     Physical Exam  Constitutional:       General: She is not in acute distress.     Appearance: Normal appearance. She is not ill-appearing.   HENT:      Head: Normocephalic and atraumatic.      Right Ear: Hearing, tympanic membrane, ear canal and external ear normal.      Left Ear: Hearing, tympanic membrane, ear canal and external ear normal.      Nose: No congestion.      Right Sinus: No maxillary sinus tenderness or frontal sinus tenderness.      Left Sinus: No maxillary sinus tenderness or frontal sinus tenderness.   Cardiovascular:      Rate and Rhythm: Normal rate and regular rhythm.   Pulmonary:      Effort: Pulmonary effort is normal.      Breath sounds: Normal breath sounds.      Comments: Frequent dry cough on exam  Musculoskeletal:         General: Normal range of motion.   Lymphadenopathy:      Cervical: No cervical adenopathy.   Skin:     General: Skin is warm and dry.   Neurological:      Mental Status: She is alert and oriented to person, place, and time.                   "

## 2024-08-09 NOTE — PATIENT INSTRUCTIONS
Albuterol inhaler every 4-6 hours as needed for chest tightness/shortness of breath/wheezing  Tessalon 1-3 times a day as needed for cough  OTC cough medicine Chestal as needed for cough  Increase hydration and rest as needed  Follow up with PCP if symptoms are not improving  If worsening symptoms ie chest pain, difficulty breathing, please go to the Emergency Room

## 2024-08-09 NOTE — TELEPHONE ENCOUNTER
Pt c/o cough for the past 2 days. Denies any other symptoms and states cough is dry. No availability with any provider in the office. Advised pt to go to [facility] now. Provided her with info for nearest location to he at [facility].

## 2024-08-12 ENCOUNTER — TELEPHONE (OUTPATIENT)
Dept: FAMILY MEDICINE CLINIC | Facility: CLINIC | Age: 59
End: 2024-08-12

## 2024-08-12 DIAGNOSIS — A37.90 WHOOPING COUGH: Primary | ICD-10-CM

## 2024-08-12 RX ORDER — AZITHROMYCIN 250 MG/1
TABLET, FILM COATED ORAL
Qty: 6 TABLET | Refills: 0 | Status: SHIPPED | OUTPATIENT
Start: 2024-08-12 | End: 2024-08-17

## 2024-08-12 NOTE — TELEPHONE ENCOUNTER
Pt called the office after her urgent care visit stating that her close friend just got diagnose with whopping cough. Patient reports that she currently has a dry cough. Patient is unsure if another appointment is needed or if any more medication is needed. Please review and advise

## 2024-08-19 ENCOUNTER — TELEPHONE (OUTPATIENT)
Age: 59
End: 2024-08-19

## 2024-08-19 DIAGNOSIS — R05.2 SUBACUTE COUGH: Primary | ICD-10-CM

## 2024-08-19 RX ORDER — PROMETHAZINE HYDROCHLORIDE AND CODEINE PHOSPHATE 6.25; 1 MG/5ML; MG/5ML
5 SYRUP ORAL EVERY 4 HOURS PRN
Qty: 118 ML | Refills: 0 | Status: SHIPPED | OUTPATIENT
Start: 2024-08-19 | End: 2024-08-26 | Stop reason: ALTCHOICE

## 2024-08-19 NOTE — TELEPHONE ENCOUNTER
Patient called returning call to office. States that she had two missed calls no voicemail. I did relay that promethazine-codeine (PHENERGAN WITH CODEINE) 6.25-10 mg/5 mL syrup was sent into the pharmacy for her. If anything additional is needed please contact her back.

## 2024-08-20 ENCOUNTER — TELEPHONE (OUTPATIENT)
Age: 59
End: 2024-08-20

## 2024-08-20 NOTE — TELEPHONE ENCOUNTER
Patients said that her pharmacy does not carry phenergan with codeine anymore. Is there something else you could prescribe for her? Please follow up with patient.

## 2024-08-21 DIAGNOSIS — R05.1 ACUTE COUGH: Primary | ICD-10-CM

## 2024-08-21 RX ORDER — HYDROCODONE BITARTRATE AND HOMATROPINE METHYLBROMIDE ORAL SOLUTION 5; 1.5 MG/5ML; MG/5ML
2.5 LIQUID ORAL 4 TIMES DAILY PRN
Qty: 120 ML | Refills: 0 | Status: SHIPPED | OUTPATIENT
Start: 2024-08-21 | End: 2024-08-26 | Stop reason: ALTCHOICE

## 2024-08-21 NOTE — TELEPHONE ENCOUNTER
Pt is waiting for her cough syrup prescription to be called in her regular [facility] pharmacy. Kindly help with the request and call back the patient to update on the same once sent. Thanks

## 2024-08-26 ENCOUNTER — TELEPHONE (OUTPATIENT)
Age: 59
End: 2024-08-26

## 2024-08-26 DIAGNOSIS — R05.2 SUBACUTE COUGH: Primary | ICD-10-CM

## 2024-08-26 RX ORDER — BENZONATATE 100 MG/1
100 CAPSULE ORAL 3 TIMES DAILY PRN
Qty: 20 CAPSULE | Refills: 0 | Status: SHIPPED | OUTPATIENT
Start: 2024-08-26

## 2024-08-26 RX ORDER — FLUTICASONE PROPIONATE AND SALMETEROL 100; 50 UG/1; UG/1
1 POWDER RESPIRATORY (INHALATION) 2 TIMES DAILY
Qty: 60 BLISTER | Refills: 0 | Status: SHIPPED | OUTPATIENT
Start: 2024-08-26

## 2024-08-26 NOTE — TELEPHONE ENCOUNTER
New medication sent to pharmacy.  This does include an inhaler please advise patient to rinse mouth out thoroughly with water after use to prevent fungus

## 2024-08-26 NOTE — TELEPHONE ENCOUNTER
Try to call pt in regards of what was said by PCP and could not get in contact . Please verify phone number if patients calls back.           '' New medication sent to pharmacy. This does include an inhaler please advice patient to rinse mouth out thoroughly with water after use to prevent fungus.

## 2024-08-26 NOTE — TELEPHONE ENCOUNTER
Patient states she could not get the cough syrup that she was prescribed last week b/c the pharmacy does not carry it. Would like to know if she can get something else or her cough b/c she the cough is still constant. Please follow up with patient.

## 2024-09-05 ENCOUNTER — VBI (OUTPATIENT)
Dept: ADMINISTRATIVE | Facility: OTHER | Age: 59
End: 2024-09-05

## 2024-09-05 NOTE — TELEPHONE ENCOUNTER
09/05/24 11:30 AM     Chart reviewed for DEXA Scan was/were not submitted to the patient's insurance.     Rachna Cano MA   [facility]

## 2024-09-20 LAB
CHOLEST SERPL-MCNC: 290 MG/DL
CHOLEST/HDLC SERPL: 6.7 (CALC)
HDLC SERPL-MCNC: 43 MG/DL
LDLC SERPL CALC-MCNC: 215 MG/DL (CALC)
NONHDLC SERPL-MCNC: 247 MG/DL (CALC)
TRIGL SERPL-MCNC: 157 MG/DL

## 2024-09-22 DIAGNOSIS — R05.2 SUBACUTE COUGH: ICD-10-CM

## 2024-09-23 RX ORDER — FLUTICASONE PROPIONATE AND SALMETEROL 100; 50 UG/1; UG/1
POWDER RESPIRATORY (INHALATION)
Qty: 60 BLISTER | Refills: 0 | Status: SHIPPED | OUTPATIENT
Start: 2024-09-23

## 2024-09-24 ENCOUNTER — OFFICE VISIT (OUTPATIENT)
Dept: FAMILY MEDICINE CLINIC | Facility: CLINIC | Age: 59
End: 2024-09-24
Payer: COMMERCIAL

## 2024-09-24 VITALS
BODY MASS INDEX: 29.45 KG/M2 | SYSTOLIC BLOOD PRESSURE: 118 MMHG | WEIGHT: 156 LBS | TEMPERATURE: 98 F | RESPIRATION RATE: 18 BRPM | DIASTOLIC BLOOD PRESSURE: 84 MMHG | OXYGEN SATURATION: 97 % | HEIGHT: 61 IN | HEART RATE: 89 BPM

## 2024-09-24 DIAGNOSIS — R05.2 SUBACUTE COUGH: ICD-10-CM

## 2024-09-24 DIAGNOSIS — R73.9 HYPERGLYCEMIA: Chronic | ICD-10-CM

## 2024-09-24 DIAGNOSIS — R03.0 ELEVATED BP WITHOUT DIAGNOSIS OF HYPERTENSION: ICD-10-CM

## 2024-09-24 DIAGNOSIS — K21.9 GASTROESOPHAGEAL REFLUX DISEASE WITHOUT ESOPHAGITIS: Chronic | ICD-10-CM

## 2024-09-24 DIAGNOSIS — E78.49 OTHER HYPERLIPIDEMIA: Chronic | ICD-10-CM

## 2024-09-24 DIAGNOSIS — Z00.00 ANNUAL PHYSICAL EXAM: Primary | ICD-10-CM

## 2024-09-24 DIAGNOSIS — E03.9 ACQUIRED HYPOTHYROIDISM: ICD-10-CM

## 2024-09-24 PROCEDURE — 99396 PREV VISIT EST AGE 40-64: CPT

## 2024-09-24 RX ORDER — SIMVASTATIN 10 MG
10 TABLET ORAL
Qty: 90 TABLET | Refills: 0 | Status: SHIPPED | OUTPATIENT
Start: 2024-09-24

## 2024-09-24 RX ORDER — SIMVASTATIN 10 MG
10 TABLET ORAL DAILY
Qty: 90 TABLET | Refills: 0 | Status: SHIPPED | OUTPATIENT
Start: 2024-09-24 | End: 2024-09-24

## 2024-09-24 NOTE — ASSESSMENT & PLAN NOTE
05/24 Current TSH 3.00  Current medications: Levothyroxine 50mcg   Continue current medication regimen

## 2024-09-24 NOTE — PATIENT INSTRUCTIONS
Patient Education     Simvastatin ([facility] STAT in)   Brand Names: US FloLipid; Zocor   Brand Names: Annette AG-Simvastatin; APO-Simvastatin; Auro-Simvastatin; BIO-Simvastatin [DSC]; DOM-Simvastatin [DSC]; JAMP-Simvastatin; Mar-Simvastatin; MINT-Simvastatin; PHARMA-Simvastatin; PMS-Simvastatin [DSC]; PRIVA-Simvastatin [DSC]; SANDOZ Simvastatin [DSC]; Simvastatin-10; Simvastatin-20; Simvastatin-40; Simvastatin-80; TARO-Simvastatin; TEVA-Simvastatin; Zocor   What is this drug used for?   It is used to lower bad cholesterol, lower triglycerides, and raise good cholesterol (HDL).  It is used in some people to lower the chance of heart attack, stroke, and certain heart procedures.  It is used to lower the chance of death from heart disease in certain people.  It may be given to you for other reasons. Talk with the doctor.  What do I need to tell my doctor BEFORE I take this drug?   If you are allergic to this drug; any part of this drug; or any other drugs, foods, or substances. Tell your doctor about the allergy and what signs you had.  If you take any drugs (prescription or OTC, natural products, vitamins) that must not be taken with this drug, like certain drugs that are used for HIV, infections, or depression. There are many drugs that must not be taken with this drug. Your doctor or pharmacist can tell you if you are taking a drug that must not be taken with this drug.  If you have liver disease or raised liver enzymes.  If you have kidney disease.  If you are breast-feeding. Do not breast-feed while you take this drug.  This is not a list of all drugs or health problems that interact with this drug.  Tell your doctor and pharmacist about all of your drugs (prescription or OTC, natural products, vitamins) and health problems. You must check to make sure that it is safe for you to take this drug with all of your drugs and health problems. Do not start, stop, or change the dose of any drug without checking with your  doctor.  What are some things I need to know or do while I take this drug?   Tell all of your health care providers that you take this drug. This includes your doctors, nurses, pharmacists, and dentists.  Have blood work checked as you have been told by the doctor. Talk with the doctor.  If you have high blood sugar (diabetes), you will need to watch your blood sugar closely.  Follow the diet and workout plan that your doctor told you about.  Do not take more than what your doctor told you to take. Taking more than you are told may raise your chance of very bad side effects.  Avoid or limit drinking alcohol to 2 drinks a day. Drinking too much alcohol may raise your chance of liver disease.  Avoid grapefruit and grapefruit juice.  If you are of Chinese descent, talk with your doctor. You could have a greater risk of muscle problems.  If you are 65 or older, use this drug with care. You could have more side effects.  This drug may cause harm to an unborn baby. If you may become pregnant, you must use birth control while taking this drug. If you get pregnant, call your doctor right away.  What are some side effects that I need to call my doctor about right away?   WARNING/CAUTION: Even though it may be rare, some people may have very bad and sometimes deadly side effects when taking a drug. Tell your doctor or get medical help right away if you have any of the following signs or symptoms that may be related to a very bad side effect:  Signs of an allergic reaction, like rash; hives; itching; red, swollen, blistered, or peeling skin with or without fever; wheezing; tightness in the chest or throat; trouble breathing, swallowing, or talking; unusual hoarseness; or swelling of the mouth, face, lips, tongue, or throat.  A heartbeat that does not feel normal.  Not able to pass urine or change in how much urine is passed.  This drug may cause muscle pain, tenderness, or weakness. The risk may be raised if you have low  thyroid function or kidney problems. It may also be raised if you take this drug with certain other drugs, or if you are 65 or older. Sometimes, a severe muscle problem may lead to kidney problems. Rarely, deaths have happened. Call your doctor right away if you have abnormal muscle pain, tenderness, or weakness (with or without fever or feeling out of sorts). Call your doctor right away if muscle problems last after your doctor has told you to stop taking this drug.  Liver problems have happened with drugs like this one. Sometimes, this has been deadly. Call your doctor right away if you have signs of liver problems like dark urine, tiredness, decreased appetite, upset stomach or stomach pain, light-colored stools, throwing up, or yellow skin or eyes.  What are some other side effects of this drug?   All drugs may cause side effects. However, many people have no side effects or only have minor side effects. Call your doctor or get medical help if any of these side effects or any other side effects bother you or do not go away:  Headache.  Stomach pain.  Upset stomach.  Constipation.  Signs of a common cold.  These are not all of the side effects that may occur. If you have questions about side effects, call your doctor. Call your doctor for medical advice about side effects.  You may report side effects to your national health agency.  You may report side effects to the FDA at 1-177.150.8326. You may also report side effects at https://www.fda.gov/medwatch.  How is this drug best taken?   Use this drug as ordered by your doctor. Read all information given to you. Follow all instructions closely.  All products:   Take in the evening.  Keep taking this drug as you have been told by your doctor or other health care provider, even if you feel well.  Liquid (suspension):   Take on an empty stomach.  Shake well for at least 20 seconds before each use.  Measure liquid doses carefully. Use the measuring device that comes  with this drug. If there is none, ask the pharmacist for a device to measure this drug.  Do not use a household teaspoon or tablespoon to measure this drug. Doing so could lead to the dose being too high.  What do I do if I miss a dose?   Take a missed dose as soon as you think about it.  If it is close to the time for your next dose, skip the missed dose and go back to your normal time.  Do not take 2 doses at the same time or extra doses.  How do I store and/or throw out this drug?   Tablets:   Store at room temperature in a dry place. Do not store in a bathroom.  Liquid (suspension):   Store at room temperature. Do not refrigerate or freeze.  Protect from heat.  Store in a dry place. Do not store in a bathroom.  After opening, throw away any part not used after 30 days.  All products:   Keep all drugs in a safe place. Keep all drugs out of the reach of children and pets.  Throw away unused or  drugs. Do not flush down a toilet or pour down a drain unless you are told to do so. Check with your pharmacist if you have questions about the best way to throw out drugs. There may be drug take-back programs in your area.  General drug facts   If your symptoms or health problems do not get better or if they become worse, call your doctor.  Do not share your drugs with others and do not take anyone else's drugs.  Some drugs may have another patient information leaflet. If you have any questions about this drug, please talk with your doctor, nurse, pharmacist, or other health care provider.  Some drugs may have another patient information leaflet. Check with your pharmacist. If you have any questions about this drug, please talk with your doctor, nurse, pharmacist, or other health care provider.  If you think there has been an overdose, call your poison control center or get medical care right away. Be ready to tell or show what was taken, how much, and when it happened.  Consumer Information Use and Disclaimer  "  This generalized information is a limited summary of diagnosis, treatment, and/or medication information. It is not meant to be comprehensive and should be used as a tool to help the user understand and/or assess potential diagnostic and treatment options. It does NOT include all information about conditions, treatments, medications, side effects, or risks that may apply to a specific patient. It is not intended to be medical advice or a substitute for the medical advice, diagnosis, or treatment of a health care provider based on the health care provider's examination and assessment of a patient's specific and unique circumstances. Patients must speak with a health care provider for complete information about their health, medical questions, and treatment options, including any risks or benefits regarding use of medications. This information does not endorse any treatments or medications as safe, effective, or approved for treating a specific patient. UpToDate, Inc. and its affiliates disclaim any warranty or liability relating to this information or the use thereof. The use of this information is governed by the Terms of Use, available at https://www.[employer].com/en/know/clinical-effectiveness-terms.  Last Reviewed Date   2022-04-26  Copyright   © 2024 UpToDate, Inc. and its affiliates and/or licensors. All rights reserved.  Patient Education     Routine physical for adults   The Basics   Written by the doctors and editors at [employer]   What is a physical? -- A physical is a routine visit, or \"check-up,\" with your doctor. You might also hear it called a \"wellness visit\" or \"preventive visit.\"  During each visit, the doctor will:   Ask about your physical and mental health   Ask about your habits, behaviors, and lifestyle   Do an exam   Give you vaccines if needed   Talk to you about any medicines you take   Give advice about your health   Answer your questions  Getting regular check-ups is an important part " "of taking care of your health. It can help your doctor find and treat any problems you have. But it's also important for preventing health problems.  A routine physical is different from a \"sick visit.\" A sick visit is when you see a doctor because of a health concern or problem. Since physicals are scheduled ahead of time, you can think about what you want to ask the doctor.  How often should I get a physical? -- It depends on your age and health. In general, for people age 21 years and older:   If you are younger than 50 years, you might be able to get a physical every 3 years.   If you are 50 years or older, your doctor might recommend a physical every year.  If you have an ongoing health condition, like diabetes or high blood pressure, your doctor will probably want to see you more often.  What happens during a physical? -- In general, each visit will include:   Physical exam - The doctor or nurse will check your height, weight, heart rate, and blood pressure. They will also look at your eyes and ears. They will ask about how you are feeling and whether you have any symptoms that bother you.   Medicines - It's a good idea to bring a list of all the medicines you take to each doctor visit. Your doctor will talk to you about your medicines and answer any questions. Tell them if you are having any side effects that bother you. You should also tell them if you are having trouble paying for any of your medicines.   Habits and behaviors - This includes:   Your diet   Your exercise habits   Whether you smoke, drink alcohol, or use drugs   Whether you are sexually active   Whether you feel safe at home  Your doctor will talk to you about things you can do to improve your health and lower your risk of health problems. They will also offer help and support. For example, if you want to quit smoking, they can give you advice and might prescribe medicines. If you want to improve your diet or get more physical activity, they " "can help you with this, too.   Lab tests, if needed - The tests you get will depend on your age and situation. For example, your doctor might want to check your:   Cholesterol   Blood sugar   Iron level   Vaccines - The recommended vaccines will depend on your age, health, and what vaccines you already had. Vaccines are very important because they can prevent certain serious or deadly infections.   Discussion of screening - \"Screening\" means checking for diseases or other health problems before they cause symptoms. Your doctor can recommend screening based on your age, risk, and preferences. This might include tests to check for:   Cancer, such as breast, prostate, cervical, ovarian, colorectal, prostate, lung, or skin cancer   Sexually transmitted infections, such as chlamydia and gonorrhea   Mental health conditions like depression and anxiety  Your doctor will talk to you about the different types of screening tests. They can help you decide which screenings to have. They can also explain what the results might mean.   Answering questions - The physical is a good time to ask the doctor or nurse questions about your health. If needed, they can refer you to other doctors or specialists, too.  Adults older than 65 years often need other care, too. As you get older, your doctor will talk to you about:   How to prevent falling at home   Hearing or vision tests   Memory testing   How to take your medicines safely   Making sure that you have the help and support you need at home  All topics are updated as new evidence becomes available and our peer review process is complete.  This topic retrieved from [employer] on: May 02, 2024.  Topic 363341 Version 1.0  Release: 32.4.3 - C32.122  © 2024 UpToDate, Inc. and/or its affiliates. All rights reserved.  Consumer Information Use and Disclaimer   Disclaimer: This generalized information is a limited summary of diagnosis, treatment, and/or medication information. It is not meant " to be comprehensive and should be used as a tool to help the user understand and/or assess potential diagnostic and treatment options. It does NOT include all information about conditions, treatments, medications, side effects, or risks that may apply to a specific patient. It is not intended to be medical advice or a substitute for the medical advice, diagnosis, or treatment of a health care provider based on the health care provider's examination and assessment of a patient's specific and unique circumstances. Patients must speak with a health care provider for complete information about their health, medical questions, and treatment options, including any risks or benefits regarding use of medications. This information does not endorse any treatments or medications as safe, effective, or approved for treating a specific patient. UpToDate, Inc. and its affiliates disclaim any warranty or liability relating to this information or the use thereof.The use of this information is governed by the Terms of Use, available at https://www.[employer].com/en/know/clinical-effectiveness-terms. 2024© UpToDate, Inc. and its affiliates and/or licensors. All rights reserved.  Copyright   © 2024 UpToDate, Inc. and/or its affiliates. All rights reserved.

## 2024-09-24 NOTE — ASSESSMENT & PLAN NOTE
05/24 Lipid panel   Cholesterol 265, Triglycerides 265, , HDL 42  09/24 Lipid panel   Cholesterol 290, Triglycerides 157, , HDL 43    Current medications: Omega-3  Since lipid panel is worsened since last visit, patient is agreeable to start medications   New medication: Simvastatin 10mg and Omega-3    Medication reviewed   Lifestyle modification: Incorporate regular exercise, avoid sugars and simple carbohydrates, weight loss and choosing healthier fats.     Orders:    simvastatin (ZOCOR) 10 mg tablet; Take 1 tablet (10 mg total) by mouth daily at bedtime

## 2024-09-24 NOTE — PROGRESS NOTES
"Adult Annual Physical  Name: Marie Lloyd      : 1965      MRN: [id number]  Encounter Provider: SHEEBA Poole  Encounter Date: 2024   Encounter department: [facility]    Assessment & Plan  Annual physical exam         Other hyperlipidemia   Lipid panel   Cholesterol 265, Triglycerides 265, , HDL 42   Lipid panel   Cholesterol 290, Triglycerides 157, , HDL 43    Current medications: Omega-3  Since lipid panel is worsened since last visit, patient is agreeable to start medications   New medication: Simvastatin 10mg and Omega-3    Medication reviewed   Lifestyle modification: Incorporate regular exercise, avoid sugars and simple carbohydrates, weight loss and choosing healthier fats.     Orders:    simvastatin (ZOCOR) 10 mg tablet; Take 1 tablet (10 mg total) by mouth daily at bedtime    Hyperglycemia   A1C 5.7   A1C 5.3       Elevated BP without diagnosis of hypertension  BP today 118/84  Blood pressures have been stable, occasionally patient can \"feel her blood pressure go up\"   All readings in the office have been stable.   Will continue to monitor        Acquired hypothyroidism   Current TSH 3.00  Current medications: Levothyroxine 50mcg   Continue current medication regimen        Gastroesophageal reflux disease without esophagitis  Current medications: Omeprazole 40mg   Continue current medication regimen        Subacute cough  Recurrent cough from whooping cough   Symptoms are slowly improving but still there       BMI 29.0-29.9,adult  Wt Readings from Last 3 Encounters:   24 70.8 kg (156 lb)   24 71.1 kg (156 lb 12.8 oz)   24 70.3 kg (155 lb)          Immunizations and preventive care screenings were discussed with patient today. Appropriate education was printed on patient's after visit summary.    Counseling:  Alcohol/drug use: discussed moderation in alcohol intake, the recommendations for healthy " alcohol use, and avoidance of illicit drug use.  Dental Health: discussed importance of regular tooth brushing, flossing, and dental visits.  Injury prevention: discussed safety/seat belts, safety helmets, smoke detectors, carbon dioxide detectors, and smoking near bedding or upholstery.  Sexual health: discussed sexually transmitted diseases, partner selection, use of condoms, avoidance of unintended pregnancy, and contraceptive alternatives.  Exercise: the importance of regular exercise/physical activity was discussed. Recommend exercise 3-5 times per week for at least 30 minutes.       Depression Screening and Follow-up Plan: Patient was screened for depression during today's encounter. They screened negative with a PHQ-2 score of 0.        History of Present Illness     Adult Annual Physical:  Patient presents for annual physical.     Diet and Physical Activity:  - Diet/Nutrition: well balanced diet.  - Exercise: walking.    Depression Screening:  - PHQ-2 Score: 0    General Health:  - Sleep: sleeps well.  - Hearing: normal hearing bilateral ears.  - Vision: no vision problems.  - Dental: regular dental visits and brushes teeth twice daily.    /GYN Health:  - Follows with GYN: no.   - Menopause: postmenopausal.   - History of STDs: no    Advanced Care Planning:  - Has an advanced directive?: no    - Has a durable medical POA?: no    - ACP document given to patient?: no      Review of Systems   Constitutional:  Negative for activity change, chills, fatigue and fever.   HENT:  Negative for congestion, ear pain, rhinorrhea, sore throat and trouble swallowing.    Eyes:  Negative for pain and visual disturbance.   Respiratory:  Negative for cough, chest tightness and shortness of breath.    Cardiovascular:  Negative for chest pain, palpitations and leg swelling.   Gastrointestinal:  Negative for abdominal pain, constipation, diarrhea, nausea and vomiting.   Genitourinary:  Negative for difficulty urinating,  "dysuria, hematuria and urgency.   Musculoskeletal:  Negative for arthralgias and back pain.   Skin:  Negative for color change and rash.   Neurological:  Negative for dizziness, seizures, syncope and headaches.   Psychiatric/Behavioral:  Negative for dysphoric mood. The patient is not nervous/anxious.    All other systems reviewed and are negative.    Medical History Reviewed by provider this encounter:  Tobacco  Allergies  Meds  Problems  Med Hx  Surg Hx  Fam Hx         Objective     /84 (BP Location: Left arm, Patient Position: Sitting, Cuff Size: Standard)   Pulse 89   Temp 98 °F (36.7 °C) (Temporal)   Resp 18   Ht 5' 1\" (1.549 m)   Wt 70.8 kg (156 lb)   SpO2 97%   BMI 29.48 kg/m²     Physical Exam  Vitals and nursing note reviewed.   Constitutional:       General: She is not in acute distress.     Appearance: Normal appearance. She is well-developed and normal weight.   HENT:      Head: Normocephalic and atraumatic.      Right Ear: Tympanic membrane, ear canal and external ear normal. There is no impacted cerumen.      Left Ear: Tympanic membrane, ear canal and external ear normal. There is no impacted cerumen.      Nose: Nose normal.      Mouth/Throat:      Mouth: Mucous membranes are moist.      Pharynx: Oropharynx is clear.   Eyes:      Extraocular Movements: Extraocular movements intact.      Conjunctiva/sclera: Conjunctivae normal.      Pupils: Pupils are equal, round, and reactive to light.   Cardiovascular:      Rate and Rhythm: Normal rate and regular rhythm.      Pulses: Normal pulses.      Heart sounds: Normal heart sounds. No murmur heard.  Pulmonary:      Effort: Pulmonary effort is normal. No respiratory distress.      Breath sounds: Normal breath sounds.   Abdominal:      General: Bowel sounds are normal.      Palpations: Abdomen is soft.      Tenderness: There is no abdominal tenderness.   Musculoskeletal:         General: Normal range of motion.      Cervical back: Normal " range of motion and neck supple.      Right lower leg: No edema.      Left lower leg: No edema.   Skin:     General: Skin is warm and dry.      Capillary Refill: Capillary refill takes less than 2 seconds.   Neurological:      General: No focal deficit present.      Mental Status: She is alert and oriented to person, place, and time. Mental status is at baseline.   Psychiatric:         Mood and Affect: Mood normal.         Behavior: Behavior normal.         Thought Content: Thought content normal.         Judgment: Judgment normal.       Administrative Statements   I have spent a total time of 40 minutes in caring for this patient on the day of the visit/encounter including Diagnostic results, Prognosis, Risks and benefits of tx options, Instructions for management, Patient and family education, Importance of tx compliance, Risk factor reductions, Impressions, Counseling / Coordination of care, Documenting in the medical record, Reviewing / ordering tests, medicine, procedures  , and Obtaining or reviewing history  .    Patient Instructions   Patient Education     Simvastatin ([facility] STAT in)   Brand Names: US FloLipid; Zocor   Brand Names: An[employer]te [employer]-Simvastatin; APO-Simvastatin; Auro-Simvastatin; BIO-Simvastatin [DSC]; DOM-Simvastatin [DSC]; JAMP-Simvastatin; Mar-Simvastatin; MINT-Simvastatin; PHARMA-Simvastatin; PMS-Simvastatin [DSC]; PRIVA-Simvastatin [DSC]; SANDOZ Simvastatin [DSC]; Simvastatin-10; Simvastatin-20; Simvastatin-40; Simvastatin-80; TARO-Simvastatin; TEVA-Simvastatin; Zocor   What is this drug used for?   It is used to lower bad cholesterol, lower triglycerides, and raise good cholesterol (HDL).  It is used in some people to lower the chance of heart attack, stroke, and certain heart procedures.  It is used to lower the chance of death from heart disease in certain people.  It may be given to you for other reasons. Talk with the doctor.  What do I need to tell my doctor BEFORE I take this drug?   If  you are allergic to this drug; any part of this drug; or any other drugs, foods, or substances. Tell your doctor about the allergy and what signs you had.  If you take any drugs (prescription or OTC, natural products, vitamins) that must not be taken with this drug, like certain drugs that are used for HIV, infections, or depression. There are many drugs that must not be taken with this drug. Your doctor or pharmacist can tell you if you are taking a drug that must not be taken with this drug.  If you have liver disease or raised liver enzymes.  If you have kidney disease.  If you are breast-feeding. Do not breast-feed while you take this drug.  This is not a list of all drugs or health problems that interact with this drug.  Tell your doctor and pharmacist about all of your drugs (prescription or OTC, natural products, vitamins) and health problems. You must check to make sure that it is safe for you to take this drug with all of your drugs and health problems. Do not start, stop, or change the dose of any drug without checking with your doctor.  What are some things I need to know or do while I take this drug?   Tell all of your health care providers that you take this drug. This includes your doctors, nurses, pharmacists, and dentists.  Have blood work checked as you have been told by the doctor. Talk with the doctor.  If you have high blood sugar (diabetes), you will need to watch your blood sugar closely.  Follow the diet and workout plan that your doctor told you about.  Do not take more than what your doctor told you to take. Taking more than you are told may raise your chance of very bad side effects.  Avoid or limit drinking alcohol to 2 drinks a day. Drinking too much alcohol may raise your chance of liver disease.  Avoid grapefruit and grapefruit juice.  If you are of Chinese descent, talk with your doctor. You could have a greater risk of muscle problems.  If you are 65 or older, use this drug with care.  You could have more side effects.  This drug may cause harm to an unborn baby. If you may become pregnant, you must use birth control while taking this drug. If you get pregnant, call your doctor right away.  What are some side effects that I need to call my doctor about right away?   WARNING/CAUTION: Even though it may be rare, some people may have very bad and sometimes deadly side effects when taking a drug. Tell your doctor or get medical help right away if you have any of the following signs or symptoms that may be related to a very bad side effect:  Signs of an allergic reaction, like rash; hives; itching; red, swollen, blistered, or peeling skin with or without fever; wheezing; tightness in the chest or throat; trouble breathing, swallowing, or talking; unusual hoarseness; or swelling of the mouth, face, lips, tongue, or throat.  A heartbeat that does not feel normal.  Not able to pass urine or change in how much urine is passed.  This drug may cause muscle pain, tenderness, or weakness. The risk may be raised if you have low thyroid function or kidney problems. It may also be raised if you take this drug with certain other drugs, or if you are 65 or older. Sometimes, a severe muscle problem may lead to kidney problems. Rarely, deaths have happened. Call your doctor right away if you have abnormal muscle pain, tenderness, or weakness (with or without fever or feeling out of sorts). Call your doctor right away if muscle problems last after your doctor has told you to stop taking this drug.  Liver problems have happened with drugs like this one. Sometimes, this has been deadly. Call your doctor right away if you have signs of liver problems like dark urine, tiredness, decreased appetite, upset stomach or stomach pain, light-colored stools, throwing up, or yellow skin or eyes.  What are some other side effects of this drug?   All drugs may cause side effects. However, many people have no side effects or only  have minor side effects. Call your doctor or get medical help if any of these side effects or any other side effects bother you or do not go away:  Headache.  Stomach pain.  Upset stomach.  Constipation.  Signs of a common cold.  These are not all of the side effects that may occur. If you have questions about side effects, call your doctor. Call your doctor for medical advice about side effects.  You may report side effects to your national health agency.  You may report side effects to the FDA at 1-501.275.8075. You may also report side effects at https://www.fda.gov/medwatch.  How is this drug best taken?   Use this drug as ordered by your doctor. Read all information given to you. Follow all instructions closely.  All products:   Take in the evening.  Keep taking this drug as you have been told by your doctor or other health care provider, even if you feel well.  Liquid (suspension):   Take on an empty stomach.  Shake well for at least 20 seconds before each use.  Measure liquid doses carefully. Use the measuring device that comes with this drug. If there is none, ask the pharmacist for a device to measure this drug.  Do not use a household teaspoon or tablespoon to measure this drug. Doing so could lead to the dose being too high.  What do I do if I miss a dose?   Take a missed dose as soon as you think about it.  If it is close to the time for your next dose, skip the missed dose and go back to your normal time.  Do not take 2 doses at the same time or extra doses.  How do I store and/or throw out this drug?   Tablets:   Store at room temperature in a dry place. Do not store in a bathroom.  Liquid (suspension):   Store at room temperature. Do not refrigerate or freeze.  Protect from heat.  Store in a dry place. Do not store in a bathroom.  After opening, throw away any part not used after 30 days.  All products:   Keep all drugs in a safe place. Keep all drugs out of the reach of children and pets.  Throw away  unused or  drugs. Do not flush down a toilet or pour down a drain unless you are told to do so. Check with your pharmacist if you have questions about the best way to throw out drugs. There may be drug take-back programs in your area.  General drug facts   If your symptoms or health problems do not get better or if they become worse, call your doctor.  Do not share your drugs with others and do not take anyone else's drugs.  Some drugs may have another patient information leaflet. If you have any questions about this drug, please talk with your doctor, nurse, pharmacist, or other health care provider.  Some drugs may have another patient information leaflet. Check with your pharmacist. If you have any questions about this drug, please talk with your doctor, nurse, pharmacist, or other health care provider.  If you think there has been an overdose, call your poison control center or get medical care right away. Be ready to tell or show what was taken, how much, and when it happened.  Consumer Information Use and Disclaimer   This generalized information is a limited summary of diagnosis, treatment, and/or medication information. It is not meant to be comprehensive and should be used as a tool to help the user understand and/or assess potential diagnostic and treatment options. It does NOT include all information about conditions, treatments, medications, side effects, or risks that may apply to a specific patient. It is not intended to be medical advice or a substitute for the medical advice, diagnosis, or treatment of a health care provider based on the health care provider's examination and assessment of a patient's specific and unique circumstances. Patients must speak with a health care provider for complete information about their health, medical questions, and treatment options, including any risks or benefits regarding use of medications. This information does not endorse any treatments or medications  "as safe, effective, or approved for treating a specific patient. UpToDate, Inc. and its affiliates disclaim any warranty or liability relating to this information or the use thereof. The use of this information is governed by the Terms of Use, available at https://www.[employer] Technologiesuwer.com/en/know/clinical-effectiveness-terms.  Last Reviewed Date   2022-04-26  Copyright   © 2024 UpToDate, Inc. and its affiliates and/or licensors. All rights reserved.  Patient Education     Routine physical for adults   The Basics   Written by the doctors and editors at [employer]   What is a physical? -- A physical is a routine visit, or \"check-up,\" with your doctor. You might also hear it called a \"wellness visit\" or \"preventive visit.\"  During each visit, the doctor will:   Ask about your physical and mental health   Ask about your habits, behaviors, and lifestyle   Do an exam   Give you vaccines if needed   Talk to you about any medicines you take   Give advice about your health   Answer your questions  Getting regular check-ups is an important part of taking care of your health. It can help your doctor find and treat any problems you have. But it's also important for preventing health problems.  A routine physical is different from a \"sick visit.\" A sick visit is when you see a doctor because of a health concern or problem. Since physicals are scheduled ahead of time, you can think about what you want to ask the doctor.  How often should I get a physical? -- It depends on your age and health. In general, for people age 21 years and older:   If you are younger than 50 years, you might be able to get a physical every 3 years.   If you are 50 years or older, your doctor might recommend a physical every year.  If you have an ongoing health condition, like diabetes or high blood pressure, your doctor will probably want to see you more often.  What happens during a physical? -- In general, each visit will include:   Physical exam - The " "doctor or nurse will check your height, weight, heart rate, and blood pressure. They will also look at your eyes and ears. They will ask about how you are feeling and whether you have any symptoms that bother you.   Medicines - It's a good idea to bring a list of all the medicines you take to each doctor visit. Your doctor will talk to you about your medicines and answer any questions. Tell them if you are having any side effects that bother you. You should also tell them if you are having trouble paying for any of your medicines.   Habits and behaviors - This includes:   Your diet   Your exercise habits   Whether you smoke, drink alcohol, or use drugs   Whether you are sexually active   Whether you feel safe at home  Your doctor will talk to you about things you can do to improve your health and lower your risk of health problems. They will also offer help and support. For example, if you want to quit smoking, they can give you advice and might prescribe medicines. If you want to improve your diet or get more physical activity, they can help you with this, too.   Lab tests, if needed - The tests you get will depend on your age and situation. For example, your doctor might want to check your:   Cholesterol   Blood sugar   Iron level   Vaccines - The recommended vaccines will depend on your age, health, and what vaccines you already had. Vaccines are very important because they can prevent certain serious or deadly infections.   Discussion of screening - \"Screening\" means checking for diseases or other health problems before they cause symptoms. Your doctor can recommend screening based on your age, risk, and preferences. This might include tests to check for:   Cancer, such as breast, prostate, cervical, ovarian, colorectal, prostate, lung, or skin cancer   Sexually transmitted infections, such as chlamydia and gonorrhea   Mental health conditions like depression and anxiety  Your doctor will talk to you about the " different types of screening tests. They can help you decide which screenings to have. They can also explain what the results might mean.   Answering questions - The physical is a good time to ask the doctor or nurse questions about your health. If needed, they can refer you to other doctors or specialists, too.  Adults older than 65 years often need other care, too. As you get older, your doctor will talk to you about:   How to prevent falling at home   Hearing or vision tests   Memory testing   How to take your medicines safely   Making sure that you have the help and support you need at home  All topics are updated as new evidence becomes available and our peer review process is complete.  This topic retrieved from [employer] on: May 02, 2024.  Topic 614480 Version 1.0  Release: 32.4.3 - C32.122  © 2024 UpToDate, Inc. and/or its affiliates. All rights reserved.  Consumer Information Use and Disclaimer   Disclaimer: This generalized information is a limited summary of diagnosis, treatment, and/or medication information. It is not meant to be comprehensive and should be used as a tool to help the user understand and/or assess potential diagnostic and treatment options. It does NOT include all information about conditions, treatments, medications, side effects, or risks that may apply to a specific patient. It is not intended to be medical advice or a substitute for the medical advice, diagnosis, or treatment of a health care provider based on the health care provider's examination and assessment of a patient's specific and unique circumstances. Patients must speak with a health care provider for complete information about their health, medical questions, and treatment options, including any risks or benefits regarding use of medications. This information does not endorse any treatments or medications as safe, effective, or approved for treating a specific patient. UpToDate, Inc. and its affiliates disclaim any  warranty or liability relating to this information or the use thereof.The use of this information is governed by the Terms of Use, available at https://www.wolterskluwer.com/en/know/clinical-effectiveness-terms. 2024© [employer] and its affiliates and/or licensors. All rights reserved.  Copyright   © 2024 [employer] and/or its affiliates. All rights reserved.

## 2024-09-24 NOTE — ASSESSMENT & PLAN NOTE
"BP today 118/84  Blood pressures have been stable, occasionally patient can \"feel her blood pressure go up\"   All readings in the office have been stable.   Will continue to monitor        "

## 2024-10-09 ENCOUNTER — OFFICE VISIT (OUTPATIENT)
Dept: FAMILY MEDICINE CLINIC | Facility: CLINIC | Age: 59
End: 2024-10-09
Payer: COMMERCIAL

## 2024-10-09 VITALS
OXYGEN SATURATION: 97 % | BODY MASS INDEX: 29.15 KG/M2 | HEART RATE: 79 BPM | WEIGHT: 154.4 LBS | RESPIRATION RATE: 18 BRPM | SYSTOLIC BLOOD PRESSURE: 142 MMHG | HEIGHT: 61 IN | DIASTOLIC BLOOD PRESSURE: 90 MMHG | TEMPERATURE: 98.1 F

## 2024-10-09 DIAGNOSIS — E78.49 OTHER HYPERLIPIDEMIA: Chronic | ICD-10-CM

## 2024-10-09 DIAGNOSIS — G47.00 INSOMNIA, UNSPECIFIED TYPE: ICD-10-CM

## 2024-10-09 DIAGNOSIS — R03.0 ELEVATED BP WITHOUT DIAGNOSIS OF HYPERTENSION: Primary | ICD-10-CM

## 2024-10-09 PROCEDURE — 99214 OFFICE O/P EST MOD 30 MIN: CPT

## 2024-10-09 RX ORDER — LISINOPRIL 5 MG/1
5 TABLET ORAL DAILY
Qty: 30 TABLET | Refills: 0 | Status: SHIPPED | OUTPATIENT
Start: 2024-10-09

## 2024-10-09 NOTE — PATIENT INSTRUCTIONS
Patient Education     Lisinopril (lyse IN oh pril)   Brand Names: US Prinivil [DSC]; Qbrelis; Zestril   Brand Names: Annette APO-Lisinopril; Auro-Lisinopril; Prinivil [DSC]; PRO-Lisinopril-10; PRO-Lisinopril-20; PRO-Lisinopril-5; RAN-Lisinopril; SANDOZ Lisinopril [DSC]; TEVA-Lisinopril (Type P) [DSC]; TEVA-Lisinopril (Type Z); Zestril   Warning   Do not take if you are pregnant. Use during pregnancy may cause birth defects or loss of the unborn baby. If you get pregnant or plan on getting pregnant while taking this drug, call your doctor right away.  What is this drug used for?   It is used to treat high blood pressure.  It is used to treat heart failure (weak heart).  It is used to help heart function after a heart attack.  It may be given to you for other reasons. Talk with the doctor.  What do I need to tell my doctor BEFORE I take this drug?   For all patients taking this drug:   If you are allergic to this drug; any part of this drug; or any other drugs, foods, or substances. Tell your doctor about the allergy and what signs you had.  If you have ever had a very bad or life-threatening reaction called angioedema. Signs may be swelling of the hands, face, lips, eyes, tongue, or throat; trouble breathing; trouble swallowing; unusual hoarseness.  If you have kidney problems.  If you are taking a drug that has aliskiren in it and you also have diabetes or kidney problems.  If you have taken a drug that has sacubitril in it in the last 36 hours.  If you are breast-feeding. Do not breast-feed while you take this drug.  Children:   If your child is younger than 6 years of age. Do not give this drug to a child younger than 6 years of age.  This is not a list of all drugs or health problems that interact with this drug.  Tell your doctor and pharmacist about all of your drugs (prescription or OTC, natural products, vitamins) and health problems. You must check to make sure that it is safe for you to take this drug with  all of your drugs and health problems. Do not start, stop, or change the dose of any drug without checking with your doctor.  What are some things I need to know or do while I take this drug?   Tell all of your health care providers that you take this drug. This includes your doctors, nurses, pharmacists, and dentists.  Avoid driving and doing other tasks or actions that call for you to be alert until you see how this drug affects you.  To lower the chance of feeling dizzy or passing out, rise slowly if you have been sitting or lying down. Be careful going up and down stairs.  If you have high blood sugar (diabetes), you will need to watch your blood sugar closely.  Check your blood pressure as you have been told.  Have your blood work and other lab tests checked as you have been told by your doctor.  If you are taking a salt substitute that has potassium in it, a potassium-sparing diuretic, or a potassium product, talk with your doctor.  If you are on a low-salt or salt-free diet, talk with your doctor.  If you are taking this drug and have high blood pressure, talk with your doctor before using OTC products that may raise blood pressure. These include cough or cold drugs, diet pills, stimulants, non-steroidal anti-inflammatory drugs (NSAIDs) like ibuprofen or naproxen, and some natural products or aids.  Talk with your doctor before you drink alcohol.  Be careful in hot weather or while being active. Drink lots of fluids to stop fluid loss.  Tell your doctor if you have too much sweat, fluid loss, throwing up, or loose stools. This may lead to low blood pressure.  This drug may not work as well to lower blood pressure in Black patients. Sometimes another drug may need to be given with this drug. If you have any questions, talk with the doctor.  A severe and sometimes deadly reaction called angioedema has happened. The chance of angioedema may be higher in Black patients.  Low white blood cell counts have happened  with captopril, a drug like this one. This may lead to more chance of getting an infection. Most of the time, this has happened in people with kidney problems, mainly if they have certain other health problems. Call your doctor right away if you have signs of infection like fever, chills, or sore throat.  If you are 65 or older, use this drug with care. You could have more side effects.  What are some side effects that I need to call my doctor about right away?   WARNING/CAUTION: Even though it may be rare, some people may have very bad and sometimes deadly side effects when taking a drug. Tell your doctor or get medical help right away if you have any of the following signs or symptoms that may be related to a very bad side effect:  Signs of an allergic reaction, like rash; hives; itching; red, swollen, blistered, or peeling skin with or without fever; wheezing; tightness in the chest or throat; trouble breathing, swallowing, or talking; unusual hoarseness; or swelling of the mouth, face, lips, tongue, or throat.  Signs of kidney problems like unable to pass urine, change in how much urine is passed, blood in the urine, or a big weight gain.  Signs of high potassium levels like a heartbeat that does not feel normal; feeling confused; feeling weak, lightheaded, or dizzy; feeling like passing out; numbness or tingling; or shortness of breath.  Severe dizziness or passing out.  Cough that does not go away.  Severe stomach pain.  Severe upset stomach or throwing up.  Chest pain or pressure.  Liver problems have happened with drugs like this one. Sometimes, this has been deadly. Call your doctor right away if you have signs of liver problems like dark urine, tiredness, decreased appetite, upset stomach or stomach pain, light-colored stools, throwing up, or yellow skin or eyes.  What are some other side effects of this drug?   All drugs may cause side effects. However, many people have no side effects or only have minor  side effects. Call your doctor or get medical help if any of these side effects or any other side effects bother you or do not go away:  Dizziness or headache.  Cough.  These are not all of the side effects that may occur. If you have questions about side effects, call your doctor. Call your doctor for medical advice about side effects.  You may report side effects to your national health agency.  You may report side effects to the FDA at 1-595.891.7787. You may also report side effects at https://www.fda.gov/medwatch.  How is this drug best taken?   Use this drug as ordered by your doctor. Read all information given to you. Follow all instructions closely.  All products:   Take with or without food.  Take this drug at the same time of day.  Keep taking this drug as you have been told by your doctor or other health care provider, even if you feel well.  Drink lots of noncaffeine liquids unless told to drink less liquid by your doctor.  Tablets:   A liquid (suspension) can be made if you cannot swallow pills. Talk with your doctor or pharmacist.  If a liquid (suspension) is made, shake well before use.  All liquid products:   Measure liquid doses carefully. Use the measuring device that comes with this drug. If there is none, ask the pharmacist for a device to measure this drug.  What do I do if I miss a dose?   Take a missed dose as soon as you think about it.  If it is close to the time for your next dose, skip the missed dose and go back to your normal time.  Do not take 2 doses at the same time or extra doses.  How do I store and/or throw out this drug?   All products:   Store at room temperature in a dry place. Do not store in a bathroom.  Keep lid tightly closed.  Keep all drugs in a safe place. Keep all drugs out of the reach of children and pets.  Throw away unused or  drugs. Do not flush down a toilet or pour down a drain unless you are told to do so. Check with your pharmacist if you have questions  about the best way to throw out drugs. There may be drug take-back programs in your area.  Tablets:   If a liquid (suspension) is made from the tablets, throw away any part not used after 28 days.  Liquid (solution):   Do not freeze.  Protect from heat.  General drug facts   If your symptoms or health problems do not get better or if they become worse, call your doctor.  Do not share your drugs with others and do not take anyone else's drugs.  Some drugs may have another patient information leaflet. If you have any questions about this drug, please talk with your doctor, nurse, pharmacist, or other health care provider.  Some drugs may have another patient information leaflet. Check with your pharmacist. If you have any questions about this drug, please talk with your doctor, nurse, pharmacist, or other health care provider.  If you think there has been an overdose, call your poison control center or get medical care right away. Be ready to tell or show what was taken, how much, and when it happened.  Consumer Information Use and Disclaimer   This generalized information is a limited summary of diagnosis, treatment, and/or medication information. It is not meant to be comprehensive and should be used as a tool to help the user understand and/or assess potential diagnostic and treatment options. It does NOT include all information about conditions, treatments, medications, side effects, or risks that may apply to a specific patient. It is not intended to be medical advice or a substitute for the medical advice, diagnosis, or treatment of a health care provider based on the health care provider's examination and assessment of a patient's specific and unique circumstances. Patients must speak with a health care provider for complete information about their health, medical questions, and treatment options, including any risks or benefits regarding use of medications. This information does not endorse any treatments or  medications as safe, effective, or approved for treating a specific patient. [employer] and its affiliates disclaim any warranty or liability relating to this information or the use thereof. The use of this information is governed by the Terms of Use, available at https://www.[employer].com/en/know/clinical-effectiveness-terms.  Last Reviewed Date   2023-09-08  Copyright   © 2024 [employer] and its affiliates and/or licensors. All rights reserved.

## 2024-10-09 NOTE — ASSESSMENT & PLAN NOTE
BP today 142/90   Goal bp 138/88, pt's bp is not at goal  Readings at home have been elevated 150-160's   All readings in the office have been stable   Current medication: None   New medication: Lisinopril 5mg  Medication compliance and side effects reviewed   Advised patient on symptoms of hypotension & severe HTN  Limit salt-intake & caffeine. DASH diet discussed, minimize stress level  Weight reduction efforts via improved diet & increased exercise  Orders:    lisinopril (ZESTRIL) 5 mg tablet; Take 1 tablet (5 mg total) by mouth daily

## 2024-10-09 NOTE — ASSESSMENT & PLAN NOTE
05/24 Lipid panel   Cholesterol 265, Triglycerides 265, , HDL 42  09/24 Lipid panel   Cholesterol 290, Triglycerides 157, , HDL 43     Current medications: Omega-3 and Simvastatin 10mg   At last visit patient was started on Simvastatin 10mg  Since starting medication patient has had 2 incidents of insomnia. Patient has struggled with difficulty sleeping her whole life but believes that symptoms are exacerbated by the Simvastatin. Reviewed proper sleep practices and encouraged use of Melatonin. If there is no relief in one month consider changing to a different statin medication   Lifestyle modification: Incorporate regular exercise, avoid sugars and simple carbohydrates, weight loss and choosing healthier fats

## 2024-10-09 NOTE — ASSESSMENT & PLAN NOTE
Sleep hygiene practices   Regular aerobic exercise, avoid napping during the day  Avoid stimulants including caffeine/nicotine  Life stressors can attribute to lack of sleep    Bright lights and electronic screens limited 2 hours before bed   Establish consistent sleep/wake times with a bedtime routine   Ensure the room is dark, cool and quiet   May utilize a sound machine if needed   Can try OTC melatonin   Educated on meditation and relaxation techniques   As we age it is normal to see sleep related changes including   Decreased time spent in a deep sleep   More frequent nighttime awakenings

## 2024-10-09 NOTE — PROGRESS NOTES
Ambulatory Visit  Name: Marie Lloyd      : 1965      MRN: [id number]  Encounter Provider: SHEEBA Poole  Encounter Date: 10/9/2024   Encounter department: [facility]    Assessment & Plan  Elevated BP without diagnosis of hypertension  BP today 142/90   Goal bp 138/88, pt's bp is not at goal  Readings at home have been elevated 150-160's   All readings in the office have been stable   Current medication: None   New medication: Lisinopril 5mg  Medication compliance and side effects reviewed   Advised patient on symptoms of hypotension & severe HTN  Limit salt-intake & caffeine. DASH diet discussed, minimize stress level  Weight reduction efforts via improved diet & increased exercise  Orders:    lisinopril (ZESTRIL) 5 mg tablet; Take 1 tablet (5 mg total) by mouth daily    Other hyperlipidemia   Lipid panel   Cholesterol 265, Triglycerides 265, , HDL 42   Lipid panel   Cholesterol 290, Triglycerides 157, , HDL 43     Current medications: Omega-3 and Simvastatin 10mg   At last visit patient was started on Simvastatin 10mg  Since starting medication patient has had 2 incidents of insomnia. Patient has struggled with difficulty sleeping her whole life but believes that symptoms are exacerbated by the Simvastatin. Reviewed proper sleep practices and encouraged use of Melatonin. If there is no relief in one month consider changing to a different statin medication   Lifestyle modification: Incorporate regular exercise, avoid sugars and simple carbohydrates, weight loss and choosing healthier fats       Insomnia, unspecified type  Sleep hygiene practices   Regular aerobic exercise, avoid napping during the day  Avoid stimulants including caffeine/nicotine  Life stressors can attribute to lack of sleep    Bright lights and electronic screens limited 2 hours before bed   Establish consistent sleep/wake times with a bedtime routine   Ensure the room is  "dark, cool and quiet   May utilize a sound machine if needed   Can try OTC melatonin   Educated on meditation and relaxation techniques   As we age it is normal to see sleep related changes including   Decreased time spent in a deep sleep   More frequent nighttime awakenings             History of Present Illness     Has been having trouble with sleeping. This has occurred throughout her whole life but recently it was two times a week. She felt as if \"her eyes were sleeping but I was not\". She tried changing positions from the bed to the couch without relief. Has tried \"sleeping time\" tea. Has taking Gilmer sleep gummies and states they are sometimes effective. Did not take the gummies during this new incident. Recently started Simvastatin which she is attributing to the lack of sleep. Recently had elevated BP's only at home, readings at home range from 150-160's. If feeling that she having increased head pressure.     Hypertension  This is a chronic problem. The current episode started more than 1 month ago. The problem has been gradually worsening since onset. The problem is uncontrolled. Associated symptoms include anxiety and malaise/fatigue. Pertinent negatives include no blurred vision, chest pain, headaches, neck pain, orthopnea, palpitations, peripheral edema, PND, shortness of breath or sweats. Agents associated with hypertension include thyroid hormones. Risk factors for coronary artery disease include post-menopausal state. Past treatments include nothing. The current treatment provides no improvement. There is no history of angina, kidney disease, CAD/MI, CVA, heart failure, left ventricular hypertrophy, PVD or retinopathy. Identifiable causes of hypertension include a thyroid problem. There is no history of a hypertension causing med or sleep apnea.       History obtained from : patient  Review of Systems   Constitutional:  Positive for malaise/fatigue. Negative for activity change, chills, fatigue and " "fever.   HENT:  Negative for congestion, ear pain, rhinorrhea, sore throat and trouble swallowing.    Eyes:  Negative for blurred vision, pain and visual disturbance.   Respiratory:  Negative for cough, chest tightness and shortness of breath.    Cardiovascular:  Negative for chest pain, palpitations, orthopnea, leg swelling and PND.   Gastrointestinal:  Negative for abdominal pain, constipation, diarrhea, nausea and vomiting.   Genitourinary:  Negative for difficulty urinating, dysuria, hematuria and urgency.   Musculoskeletal:  Negative for arthralgias, back pain and neck pain.   Skin:  Negative for color change and rash.   Neurological:  Negative for dizziness, seizures, syncope and headaches.   Psychiatric/Behavioral:  Negative for dysphoric mood. The patient is not nervous/anxious.    All other systems reviewed and are negative.    Medical History Reviewed by provider this encounter:  Tobacco  Allergies  Meds  Problems  Med Hx  Surg Hx  Fam Hx           Objective     /90 (BP Location: Left arm, Patient Position: Sitting, Cuff Size: Standard)   Pulse 79   Temp 98.1 °F (36.7 °C) (Temporal)   Resp 18   Ht 5' 1\" (1.549 m)   Wt 70 kg (154 lb 6.4 oz)   SpO2 97%   BMI 29.17 kg/m²     Physical Exam  Vitals and nursing note reviewed.   Constitutional:       General: She is not in acute distress.     Appearance: Normal appearance. She is well-developed and normal weight.   HENT:      Head: Normocephalic and atraumatic.      Right Ear: Tympanic membrane, ear canal and external ear normal. There is no impacted cerumen.      Left Ear: Tympanic membrane, ear canal and external ear normal. There is no impacted cerumen.      Nose: Nose normal.      Mouth/Throat:      Mouth: Mucous membranes are moist.      Pharynx: Oropharynx is clear.   Eyes:      Extraocular Movements: Extraocular movements intact.      Conjunctiva/sclera: Conjunctivae normal.      Pupils: Pupils are equal, round, and reactive to light. "   Cardiovascular:      Rate and Rhythm: Normal rate and regular rhythm.      Pulses: Normal pulses.      Heart sounds: Normal heart sounds. No murmur heard.  Pulmonary:      Effort: Pulmonary effort is normal. No respiratory distress.      Breath sounds: Normal breath sounds.   Abdominal:      General: Bowel sounds are normal.      Palpations: Abdomen is soft.      Tenderness: There is no abdominal tenderness.   Musculoskeletal:         General: Normal range of motion.      Cervical back: Normal range of motion and neck supple.      Right lower leg: No edema.      Left lower leg: No edema.   Skin:     General: Skin is warm and dry.      Capillary Refill: Capillary refill takes less than 2 seconds.   Neurological:      General: No focal deficit present.      Mental Status: She is alert and oriented to person, place, and time. Mental status is at baseline.   Psychiatric:         Mood and Affect: Mood normal.         Behavior: Behavior normal.         Thought Content: Thought content normal.         Judgment: Judgment normal.       Administrative Statements   I have spent a total time of 20 minutes in caring for this patient on the day of the visit/encounter including Diagnostic results, Prognosis, Risks and benefits of tx options, Instructions for management, Patient and family education, Importance of tx compliance, Risk factor reductions, Impressions, Counseling / Coordination of care, Documenting in the medical record, Reviewing / ordering tests, medicine, procedures  , and Obtaining or reviewing history  .    Patient Instructions   Patient Education     Lisinopril (lyse IN oh pril)   Brand Names: US Prinivil [DSC]; Qbrelis; Zestril   Brand Names: Annette APO-Lisinopril; Auro-Lisinopril; Prinivil [DSC]; PRO-Lisinopril-10; PRO-Lisinopril-20; PRO-Lisinopril-5; RAN-Lisinopril; SANDOZ Lisinopril [DSC]; TEVA-Lisinopril (Type P) [DSC]; TEVA-Lisinopril (Type Z); Zestril   Warning   Do not take if you are pregnant. Use  during pregnancy may cause birth defects or loss of the unborn baby. If you get pregnant or plan on getting pregnant while taking this drug, call your doctor right away.  What is this drug used for?   It is used to treat high blood pressure.  It is used to treat heart failure (weak heart).  It is used to help heart function after a heart attack.  It may be given to you for other reasons. Talk with the doctor.  What do I need to tell my doctor BEFORE I take this drug?   For all patients taking this drug:   If you are allergic to this drug; any part of this drug; or any other drugs, foods, or substances. Tell your doctor about the allergy and what signs you had.  If you have ever had a very bad or life-threatening reaction called angioedema. Signs may be swelling of the hands, face, lips, eyes, tongue, or throat; trouble breathing; trouble swallowing; unusual hoarseness.  If you have kidney problems.  If you are taking a drug that has aliskiren in it and you also have diabetes or kidney problems.  If you have taken a drug that has sacubitril in it in the last 36 hours.  If you are breast-feeding. Do not breast-feed while you take this drug.  Children:   If your child is younger than 6 years of age. Do not give this drug to a child younger than 6 years of age.  This is not a list of all drugs or health problems that interact with this drug.  Tell your doctor and pharmacist about all of your drugs (prescription or OTC, natural products, vitamins) and health problems. You must check to make sure that it is safe for you to take this drug with all of your drugs and health problems. Do not start, stop, or change the dose of any drug without checking with your doctor.  What are some things I need to know or do while I take this drug?   Tell all of your health care providers that you take this drug. This includes your doctors, nurses, pharmacists, and dentists.  Avoid driving and doing other tasks or actions that call for you  to be alert until you see how this drug affects you.  To lower the chance of feeling dizzy or passing out, rise slowly if you have been sitting or lying down. Be careful going up and down stairs.  If you have high blood sugar (diabetes), you will need to watch your blood sugar closely.  Check your blood pressure as you have been told.  Have your blood work and other lab tests checked as you have been told by your doctor.  If you are taking a salt substitute that has potassium in it, a potassium-sparing diuretic, or a potassium product, talk with your doctor.  If you are on a low-salt or salt-free diet, talk with your doctor.  If you are taking this drug and have high blood pressure, talk with your doctor before using OTC products that may raise blood pressure. These include cough or cold drugs, diet pills, stimulants, non-steroidal anti-inflammatory drugs (NSAIDs) like ibuprofen or naproxen, and some natural products or aids.  Talk with your doctor before you drink alcohol.  Be careful in hot weather or while being active. Drink lots of fluids to stop fluid loss.  Tell your doctor if you have too much sweat, fluid loss, throwing up, or loose stools. This may lead to low blood pressure.  This drug may not work as well to lower blood pressure in Black patients. Sometimes another drug may need to be given with this drug. If you have any questions, talk with the doctor.  A severe and sometimes deadly reaction called angioedema has happened. The chance of angioedema may be higher in Black patients.  Low white blood cell counts have happened with captopril, a drug like this one. This may lead to more chance of getting an infection. Most of the time, this has happened in people with kidney problems, mainly if they have certain other health problems. Call your doctor right away if you have signs of infection like fever, chills, or sore throat.  If you are 65 or older, use this drug with care. You could have more side  effects.  What are some side effects that I need to call my doctor about right away?   WARNING/CAUTION: Even though it may be rare, some people may have very bad and sometimes deadly side effects when taking a drug. Tell your doctor or get medical help right away if you have any of the following signs or symptoms that may be related to a very bad side effect:  Signs of an allergic reaction, like rash; hives; itching; red, swollen, blistered, or peeling skin with or without fever; wheezing; tightness in the chest or throat; trouble breathing, swallowing, or talking; unusual hoarseness; or swelling of the mouth, face, lips, tongue, or throat.  Signs of kidney problems like unable to pass urine, change in how much urine is passed, blood in the urine, or a big weight gain.  Signs of high potassium levels like a heartbeat that does not feel normal; feeling confused; feeling weak, lightheaded, or dizzy; feeling like passing out; numbness or tingling; or shortness of breath.  Severe dizziness or passing out.  Cough that does not go away.  Severe stomach pain.  Severe upset stomach or throwing up.  Chest pain or pressure.  Liver problems have happened with drugs like this one. Sometimes, this has been deadly. Call your doctor right away if you have signs of liver problems like dark urine, tiredness, decreased appetite, upset stomach or stomach pain, light-colored stools, throwing up, or yellow skin or eyes.  What are some other side effects of this drug?   All drugs may cause side effects. However, many people have no side effects or only have minor side effects. Call your doctor or get medical help if any of these side effects or any other side effects bother you or do not go away:  Dizziness or headache.  Cough.  These are not all of the side effects that may occur. If you have questions about side effects, call your doctor. Call your doctor for medical advice about side effects.  You may report side effects to your  national health agency.  You may report side effects to the FDA at 1-628.421.3786. You may also report side effects at https://www.fda.gov/medwatch.  How is this drug best taken?   Use this drug as ordered by your doctor. Read all information given to you. Follow all instructions closely.  All products:   Take with or without food.  Take this drug at the same time of day.  Keep taking this drug as you have been told by your doctor or other health care provider, even if you feel well.  Drink lots of noncaffeine liquids unless told to drink less liquid by your doctor.  Tablets:   A liquid (suspension) can be made if you cannot swallow pills. Talk with your doctor or pharmacist.  If a liquid (suspension) is made, shake well before use.  All liquid products:   Measure liquid doses carefully. Use the measuring device that comes with this drug. If there is none, ask the pharmacist for a device to measure this drug.  What do I do if I miss a dose?   Take a missed dose as soon as you think about it.  If it is close to the time for your next dose, skip the missed dose and go back to your normal time.  Do not take 2 doses at the same time or extra doses.  How do I store and/or throw out this drug?   All products:   Store at room temperature in a dry place. Do not store in a bathroom.  Keep lid tightly closed.  Keep all drugs in a safe place. Keep all drugs out of the reach of children and pets.  Throw away unused or  drugs. Do not flush down a toilet or pour down a drain unless you are told to do so. Check with your pharmacist if you have questions about the best way to throw out drugs. There may be drug take-back programs in your area.  Tablets:   If a liquid (suspension) is made from the tablets, throw away any part not used after 28 days.  Liquid (solution):   Do not freeze.  Protect from heat.  General drug facts   If your symptoms or health problems do not get better or if they become worse, call your doctor.  Do  not share your drugs with others and do not take anyone else's drugs.  Some drugs may have another patient information leaflet. If you have any questions about this drug, please talk with your doctor, nurse, pharmacist, or other health care provider.  Some drugs may have another patient information leaflet. Check with your pharmacist. If you have any questions about this drug, please talk with your doctor, nurse, pharmacist, or other health care provider.  If you think there has been an overdose, call your poison control center or get medical care right away. Be ready to tell or show what was taken, how much, and when it happened.  Consumer Information Use and Disclaimer   This generalized information is a limited summary of diagnosis, treatment, and/or medication information. It is not meant to be comprehensive and should be used as a tool to help the user understand and/or assess potential diagnostic and treatment options. It does NOT include all information about conditions, treatments, medications, side effects, or risks that may apply to a specific patient. It is not intended to be medical advice or a substitute for the medical advice, diagnosis, or treatment of a health care provider based on the health care provider's examination and assessment of a patient's specific and unique circumstances. Patients must speak with a health care provider for complete information about their health, medical questions, and treatment options, including any risks or benefits regarding use of medications. This information does not endorse any treatments or medications as safe, effective, or approved for treating a specific patient. [employer] and its affiliates disclaim any warranty or liability relating to this information or the use thereof. The use of this information is governed by the Terms of Use, available at https://www.woltersReturnHauleruwer.com/en/know/clinical-effectiveness-terms.  Last Reviewed Date    2023-09-08  Copyright   © 2024 [employer] and its affiliates and/or licensors. All rights reserved.

## 2024-10-15 ENCOUNTER — HOSPITAL ENCOUNTER (OUTPATIENT)
Dept: BONE DENSITY | Facility: MEDICAL CENTER | Age: 59
Discharge: HOME | End: 2024-10-15
Payer: COMMERCIAL

## 2024-10-15 DIAGNOSIS — S42.255D CLOSED NONDISPLACED FRACTURE OF GREATER TUBEROSITY OF LEFT HUMERUS WITH ROUTINE HEALING, SUBSEQUENT ENCOUNTER: ICD-10-CM

## 2024-10-15 PROCEDURE — 77080 DXA BONE DENSITY AXIAL: CPT

## 2024-10-17 DIAGNOSIS — K21.9 GASTROESOPHAGEAL REFLUX DISEASE WITHOUT ESOPHAGITIS: ICD-10-CM

## 2024-10-17 RX ORDER — OMEPRAZOLE 40 MG/1
CAPSULE, DELAYED RELEASE ORAL
Qty: 90 CAPSULE | Refills: 1 | Status: SHIPPED | OUTPATIENT
Start: 2024-10-17

## 2024-11-01 DIAGNOSIS — R03.0 ELEVATED BP WITHOUT DIAGNOSIS OF HYPERTENSION: ICD-10-CM

## 2024-11-01 RX ORDER — LISINOPRIL 5 MG/1
5 TABLET ORAL DAILY
Qty: 90 TABLET | Refills: 1 | Status: SHIPPED | OUTPATIENT
Start: 2024-11-01

## 2024-11-06 ENCOUNTER — OFFICE VISIT (OUTPATIENT)
Dept: FAMILY MEDICINE CLINIC | Facility: CLINIC | Age: 59
End: 2024-11-06
Payer: COMMERCIAL

## 2024-11-06 VITALS
SYSTOLIC BLOOD PRESSURE: 110 MMHG | DIASTOLIC BLOOD PRESSURE: 74 MMHG | HEART RATE: 95 BPM | OXYGEN SATURATION: 97 % | WEIGHT: 155 LBS | HEIGHT: 61 IN | TEMPERATURE: 98 F | BODY MASS INDEX: 29.27 KG/M2

## 2024-11-06 DIAGNOSIS — R03.0 ELEVATED BP WITHOUT DIAGNOSIS OF HYPERTENSION: Primary | ICD-10-CM

## 2024-11-06 DIAGNOSIS — E78.49 OTHER HYPERLIPIDEMIA: Chronic | ICD-10-CM

## 2024-11-06 PROCEDURE — 99213 OFFICE O/P EST LOW 20 MIN: CPT

## 2024-11-06 NOTE — ASSESSMENT & PLAN NOTE
05/24 Lipid panel   Cholesterol 265, Triglycerides 265, , HDL 42  09/24 Lipid panel   Cholesterol 290, Triglycerides 157, , HDL 43    Current medications: Omega-3 and Simvastatin 10mg   Continue current medication regimen   Lifestyle modification: Incorporate regular exercise, avoid sugars and simple carbohydrates, weight loss and choosing healthier fats    Orders:    Lipid panel; Future

## 2024-11-06 NOTE — PROGRESS NOTES
Ambulatory Visit  Name: Marie Lloyd      : 1965      MRN: [id number]  Encounter Provider: SHEEBA Poole  Encounter Date: 2024   Encounter department: [facility]    Assessment & Plan  Elevated BP without diagnosis of hypertension  BP today 110/74  Goal BP <140/90, patient is at goal   See attached image for BP at home   Current medication: Lisinopril 5mg  At last visit patient was started on lisinopril 5mg   Continue current medication regimen   Advised patient on symptoms of hypotension & severe HTN  Limit salt-intake & caffeine. DASH diet discussed, minimize stress level  Weight reduction efforts via improved diet & increased exercise             Other hyperlipidemia   Lipid panel   Cholesterol 265, Triglycerides 265, , HDL 42   Lipid panel   Cholesterol 290, Triglycerides 157, , HDL 43    Current medications: Omega-3 and Simvastatin 10mg   Continue current medication regimen   Lifestyle modification: Incorporate regular exercise, avoid sugars and simple carbohydrates, weight loss and choosing healthier fats    Orders:    Lipid panel; Future       History of Present Illness     Hypertension  This is a recurrent problem. The current episode started 1 to 4 weeks ago. The problem has been gradually improving since onset. The problem is controlled. Pertinent negatives include no anxiety, blurred vision, chest pain, headaches, malaise/fatigue, neck pain, orthopnea, palpitations, peripheral edema, PND, shortness of breath or sweats. There are no associated agents to hypertension. Risk factors for coronary artery disease include dyslipidemia and post-menopausal state. Past treatments include ACE inhibitors. The current treatment provides significant improvement. There are no compliance problems.  There is no history of angina, kidney disease, CAD/MI, CVA, heart failure, left ventricular hypertrophy, PVD or retinopathy. There is no history of  "chronic renal disease, coarctation of the aorta, hyperaldosteronism, hypercortisolism, hyperparathyroidism, a hypertension causing med, pheochromocytoma, renovascular disease, sleep apnea or a thyroid problem.       History obtained from : patient  Review of Systems   Constitutional:  Negative for activity change, chills, fatigue, fever and malaise/fatigue.   HENT:  Negative for congestion, ear pain, rhinorrhea, sore throat and trouble swallowing.    Eyes:  Negative for blurred vision, pain and visual disturbance.   Respiratory:  Negative for cough, chest tightness and shortness of breath.    Cardiovascular:  Negative for chest pain, palpitations, orthopnea, leg swelling and PND.   Gastrointestinal:  Negative for abdominal pain, constipation, diarrhea, nausea and vomiting.   Genitourinary:  Negative for difficulty urinating, dysuria, hematuria and urgency.   Musculoskeletal:  Negative for arthralgias, back pain and neck pain.   Skin:  Negative for color change and rash.   Neurological:  Negative for dizziness, seizures, syncope and headaches.   Psychiatric/Behavioral:  Negative for dysphoric mood. The patient is not nervous/anxious.    All other systems reviewed and are negative.    Medical History Reviewed by provider this encounter:  Tobacco  Allergies  Meds  Problems  Med Hx  Surg Hx  Fam Hx           Objective     /74 (BP Location: Left arm, Patient Position: Sitting, Cuff Size: Adult)   Pulse 95   Temp 98 °F (36.7 °C) (Temporal)   Ht 5' 1\" (1.549 m)   Wt 70.3 kg (155 lb)   SpO2 97%   BMI 29.29 kg/m²     Physical Exam  Vitals and nursing note reviewed.   Constitutional:       General: She is not in acute distress.     Appearance: Normal appearance. She is well-developed and normal weight.   HENT:      Head: Normocephalic and atraumatic.      Right Ear: Tympanic membrane, ear canal and external ear normal. There is no impacted cerumen.      Left Ear: Tympanic membrane, ear canal and external " ear normal. There is no impacted cerumen.      Nose: Nose normal.      Mouth/Throat:      Mouth: Mucous membranes are moist.      Pharynx: Oropharynx is clear.   Eyes:      Extraocular Movements: Extraocular movements intact.      Conjunctiva/sclera: Conjunctivae normal.      Pupils: Pupils are equal, round, and reactive to light.   Cardiovascular:      Rate and Rhythm: Normal rate and regular rhythm.      Pulses: Normal pulses.      Heart sounds: Normal heart sounds. No murmur heard.  Pulmonary:      Effort: Pulmonary effort is normal. No respiratory distress.      Breath sounds: Normal breath sounds.   Abdominal:      General: Bowel sounds are normal.      Palpations: Abdomen is soft.      Tenderness: There is no abdominal tenderness.   Musculoskeletal:         General: Normal range of motion.      Cervical back: Normal range of motion and neck supple.      Right lower leg: No edema.      Left lower leg: No edema.   Skin:     General: Skin is warm and dry.      Capillary Refill: Capillary refill takes less than 2 seconds.   Neurological:      General: No focal deficit present.      Mental Status: She is alert and oriented to person, place, and time. Mental status is at baseline.   Psychiatric:         Mood and Affect: Mood normal.         Behavior: Behavior normal.         Thought Content: Thought content normal.         Judgment: Judgment normal.       Administrative Statements   I have spent a total time of 20 minutes in caring for this patient on the day of the visit/encounter including Diagnostic results, Prognosis, Risks and benefits of tx options, Instructions for management, Patient and family education, Importance of tx compliance, Risk factor reductions, Impressions, Counseling / Coordination of care, Documenting in the medical record, Reviewing / ordering tests, medicine, procedures  , and Obtaining or reviewing history  .    BMI Counseling: Body mass index is 29.29 kg/m². The BMI is above normal.  Nutrition recommendations include reducing portion sizes, decreasing overall calorie intake, 3-5 servings of fruits/vegetables daily, reducing fast food intake, consuming healthier snacks, decreasing soda and/or juice intake, moderation in carbohydrate intake, increasing intake of lean protein, reducing intake of saturated fat and trans fat, and reducing intake of cholesterol. Exercise recommendations include moderate aerobic physical activity for 150 minutes/week, vigorous aerobic physical activity for 75 minutes/week, exercising 3-5 times per week, joining a gym, and strength training exercises.

## 2024-11-16 DIAGNOSIS — R51.9 NONINTRACTABLE HEADACHE, UNSPECIFIED CHRONICITY PATTERN, UNSPECIFIED HEADACHE TYPE: ICD-10-CM

## 2024-11-18 RX ORDER — LEVOTHYROXINE SODIUM 50 UG/1
50 TABLET ORAL DAILY
Qty: 90 TABLET | Refills: 1 | Status: SHIPPED | OUTPATIENT
Start: 2024-11-18

## 2024-12-21 DIAGNOSIS — E78.49 OTHER HYPERLIPIDEMIA: Chronic | ICD-10-CM

## 2024-12-23 RX ORDER — SIMVASTATIN 10 MG
10 TABLET ORAL
Qty: 90 TABLET | Refills: 1 | Status: SHIPPED | OUTPATIENT
Start: 2024-12-23

## 2024-12-27 ENCOUNTER — OFFICE VISIT (OUTPATIENT)
Dept: FAMILY MEDICINE CLINIC | Facility: CLINIC | Age: 59
End: 2024-12-27
Payer: COMMERCIAL

## 2024-12-27 VITALS
OXYGEN SATURATION: 99 % | DIASTOLIC BLOOD PRESSURE: 84 MMHG | HEART RATE: 97 BPM | SYSTOLIC BLOOD PRESSURE: 138 MMHG | TEMPERATURE: 98.4 F | HEIGHT: 61 IN | BODY MASS INDEX: 29.19 KG/M2 | WEIGHT: 154.6 LBS | RESPIRATION RATE: 20 BRPM

## 2024-12-27 DIAGNOSIS — J06.9 UPPER RESPIRATORY TRACT INFECTION, UNSPECIFIED TYPE: Primary | ICD-10-CM

## 2024-12-27 PROCEDURE — 87636 SARSCOV2 & INF A&B AMP PRB: CPT

## 2024-12-27 PROCEDURE — 99213 OFFICE O/P EST LOW 20 MIN: CPT

## 2024-12-27 NOTE — PROGRESS NOTES
Name: Marie Lloyd      : 1965      MRN: [id number]  Encounter Provider: SHEEBA Poole  Encounter Date: 2024   Encounter department: [facility]  :  Assessment & Plan  Upper respiratory tract infection, unspecified type  Symptom onset 24   Current therapies: none   Covid/flu send out to be done today.    Make sure you have a thermometer and if you feel chills or sweats check it and write it down.  Take Tylenol for fevers, body aches, and headaches  Drink plenty of fluids to stay well hydrated at least 2 L per day  Hot water with lemon or honey, warm tea, Can drink Gatorade/Powerade zero, mix with water    Use over-the-counter saline nasal spray/allergy medication for congestion, runny nose, and postnasal drip  Over the counter Mucinex to help clear mucus. Delsym is a cough suppressant.   Vicks to the front/back of the chest bottom of the feet with socks   Stay out of work/school until afebrile >24 hours without use of antipyretics  Orders:    COVID/FLU           History of Present Illness     URI   This is a new problem. The current episode started in the past 7 days (24). The problem has been gradually worsening. There has been no fever. Associated symptoms include congestion, coughing, headaches and rhinorrhea. Pertinent negatives include no abdominal pain, chest pain, diarrhea, dysuria, ear pain, nausea, rash, sinus pain, sneezing, sore throat or vomiting. She has tried nothing for the symptoms. The treatment provided no relief.     Review of Systems   Constitutional:  Positive for fatigue. Negative for activity change, appetite change, chills and fever.   HENT:  Positive for congestion, postnasal drip and rhinorrhea. Negative for ear pain, sinus pressure, sinus pain, sneezing, sore throat and trouble swallowing.    Eyes:  Negative for pain and visual disturbance.   Respiratory:  Positive for cough. Negative for chest tightness and shortness of  "breath.    Cardiovascular:  Negative for chest pain, palpitations and leg swelling.   Gastrointestinal:  Negative for abdominal pain, constipation, diarrhea, nausea and vomiting.   Genitourinary:  Negative for difficulty urinating, dysuria, hematuria and urgency.   Musculoskeletal:  Positive for back pain. Negative for arthralgias.   Skin:  Negative for color change and rash.   Neurological:  Positive for headaches. Negative for dizziness, seizures and syncope.   Psychiatric/Behavioral:  Negative for dysphoric mood. The patient is not nervous/anxious.    All other systems reviewed and are negative.      Objective   /84 (BP Location: Left arm, Patient Position: Sitting, Cuff Size: Standard)   Pulse 97   Temp 98.4 °F (36.9 °C) (Temporal)   Resp 20   Ht 5' 1\" (1.549 m)   Wt 70.1 kg (154 lb 9.6 oz)   SpO2 99%   BMI 29.21 kg/m²      Physical Exam  Vitals and nursing note reviewed.   Constitutional:       General: She is not in acute distress.     Appearance: Normal appearance. She is well-developed and normal weight.   HENT:      Head: Normocephalic and atraumatic.      Right Ear: Tympanic membrane, ear canal and external ear normal. There is no impacted cerumen.      Left Ear: Tympanic membrane, ear canal and external ear normal. There is no impacted cerumen.      Nose: Congestion present.      Mouth/Throat:      Mouth: Mucous membranes are moist.      Pharynx: Oropharyngeal exudate present. No posterior oropharyngeal erythema.   Eyes:      Extraocular Movements: Extraocular movements intact.      Conjunctiva/sclera: Conjunctivae normal.      Pupils: Pupils are equal, round, and reactive to light.   Cardiovascular:      Rate and Rhythm: Normal rate and regular rhythm.      Pulses: Normal pulses.      Heart sounds: Normal heart sounds. No murmur heard.  Pulmonary:      Effort: Pulmonary effort is normal. No respiratory distress.      Breath sounds: Normal breath sounds.   Abdominal:      General: Bowel " sounds are normal.      Palpations: Abdomen is soft.      Tenderness: There is no abdominal tenderness.   Musculoskeletal:         General: Normal range of motion.      Cervical back: Normal range of motion and neck supple.      Right lower leg: No edema.      Left lower leg: No edema.   Skin:     General: Skin is warm and dry.      Capillary Refill: Capillary refill takes less than 2 seconds.   Neurological:      General: No focal deficit present.      Mental Status: She is alert and oriented to person, place, and time. Mental status is at baseline.   Psychiatric:         Mood and Affect: Mood normal.         Behavior: Behavior normal.         Thought Content: Thought content normal.         Judgment: Judgment normal.       Administrative Statements   I have spent a total time of 20 minutes in caring for this patient on the day of the visit/encounter including Diagnostic results, Prognosis, Risks and benefits of tx options, Instructions for management, Patient and family education, Importance of tx compliance, Risk factor reductions, Impressions, Counseling / Coordination of care, Documenting in the medical record, Reviewing / ordering tests, medicine, procedures  , and Obtaining or reviewing history  .     Strongly encourage getting plenty of rest over the next few days.  Increase your hydration.    Vaporizer by bedside may also be helpful.          Symptom Relief Suggestions:     Options for sore throat or hoarseness:               * Warm salt water gargles every 1-2 hours while awake        * Throat lozenges, Tylenol, voice rest, warm tea with honey.     Options for sinus pressure, nasal congestion, runny nose, and / or post nasal drip:            * Clearing your sinuses in a nice steamy shower may be helpful, especially first thing after waking.       * Nasal saline rinses every 1-2 hours while awake may also help decrease nasal congestion, drainage.       * Afrin nasal spray if significant nasal congestion at  bedtime may use .  (Do not use for over 3 days however.)       * Decongestant / expectorant such as Mucinex D 12 hour 1/2 to 1 tablet as needed with full glass of fluids may help decrease pressure and drainage.     Options for ear pressure, discomfort:          * Decongestant may be helpful.       * Flonase nasal spray.       * Warm compresses against ear(s) for comfort.     Options for help with  cough:          * Warm tea with honey or a teaspoon of honey periodically throughout day and / or before bed.       * Plain Mucinex (an expectorant to help keep mucus thin so you can clear it easier) or Mucinex DM (expectorant / cough suppressant) to help decrease cough if it is bothering your sleep.        Other night time cough medication options include Delsym, Robitussin DM, NyQuil.         * Propping with an extra pillow or two may be helpful.       * Keep water by your bedside to sip on as needed.       * Cough drops.       * Vaporizer by bedside.       * Getting in steamy shower or bathroom.  Cool air may also soothe coughing spasm.

## 2024-12-28 LAB
FLUAV RNA RESP QL NAA+PROBE: NEGATIVE
FLUBV RNA RESP QL NAA+PROBE: NEGATIVE
SARS-COV-2 RNA RESP QL NAA+PROBE: NEGATIVE

## 2025-02-03 DIAGNOSIS — R03.0 ELEVATED BP WITHOUT DIAGNOSIS OF HYPERTENSION: ICD-10-CM

## 2025-02-04 DIAGNOSIS — R03.0 ELEVATED BP WITHOUT DIAGNOSIS OF HYPERTENSION: ICD-10-CM

## 2025-02-04 RX ORDER — LISINOPRIL 5 MG/1
5 TABLET ORAL DAILY
Qty: 90 TABLET | Refills: 0 | OUTPATIENT
Start: 2025-02-04

## 2025-02-05 DIAGNOSIS — K21.9 GASTROESOPHAGEAL REFLUX DISEASE WITHOUT ESOPHAGITIS: ICD-10-CM

## 2025-02-05 RX ORDER — LISINOPRIL 5 MG/1
5 TABLET ORAL DAILY
Qty: 90 TABLET | Refills: 1 | Status: SHIPPED | OUTPATIENT
Start: 2025-02-05

## 2025-02-06 RX ORDER — OMEPRAZOLE 40 MG/1
40 CAPSULE, DELAYED RELEASE ORAL DAILY
Qty: 90 CAPSULE | Refills: 1 | OUTPATIENT
Start: 2025-02-06

## 2025-02-14 LAB
CHOLEST SERPL-MCNC: 207 MG/DL
CHOLEST/HDLC SERPL: 4.7 (CALC)
HDLC SERPL-MCNC: 44 MG/DL
LDLC SERPL CALC-MCNC: 132 MG/DL (CALC)
NONHDLC SERPL-MCNC: 163 MG/DL (CALC)
TRIGL SERPL-MCNC: 175 MG/DL

## 2025-03-24 ENCOUNTER — OFFICE VISIT (OUTPATIENT)
Dept: FAMILY MEDICINE CLINIC | Facility: CLINIC | Age: 60
End: 2025-03-24
Payer: COMMERCIAL

## 2025-03-24 VITALS
HEIGHT: 61 IN | OXYGEN SATURATION: 98 % | HEART RATE: 81 BPM | TEMPERATURE: 97.8 F | BODY MASS INDEX: 29.72 KG/M2 | WEIGHT: 157.4 LBS | SYSTOLIC BLOOD PRESSURE: 108 MMHG | DIASTOLIC BLOOD PRESSURE: 80 MMHG

## 2025-03-24 DIAGNOSIS — E78.49 OTHER HYPERLIPIDEMIA: Chronic | ICD-10-CM

## 2025-03-24 DIAGNOSIS — J06.9 UPPER RESPIRATORY TRACT INFECTION, UNSPECIFIED TYPE: ICD-10-CM

## 2025-03-24 DIAGNOSIS — K21.9 GASTROESOPHAGEAL REFLUX DISEASE WITHOUT ESOPHAGITIS: Chronic | ICD-10-CM

## 2025-03-24 DIAGNOSIS — Z09 FOLLOW-UP EXAM: Primary | ICD-10-CM

## 2025-03-24 DIAGNOSIS — Z12.31 ENCOUNTER FOR SCREENING MAMMOGRAM FOR MALIGNANT NEOPLASM OF BREAST: ICD-10-CM

## 2025-03-24 DIAGNOSIS — R03.0 ELEVATED BP WITHOUT DIAGNOSIS OF HYPERTENSION: ICD-10-CM

## 2025-03-24 DIAGNOSIS — E03.9 ACQUIRED HYPOTHYROIDISM: ICD-10-CM

## 2025-03-24 PROCEDURE — 99214 OFFICE O/P EST MOD 30 MIN: CPT

## 2025-03-24 RX ORDER — IBUPROFEN 200 MG
1 CAPSULE ORAL DAILY
COMMUNITY

## 2025-03-24 NOTE — ASSESSMENT & PLAN NOTE
BP today 108/80  Goal BP <140/90, patient is at goal   See attached image for BP at home   Current medication: Lisinopril 5mg  At last visit patient was started on lisinopril 5mg   Continue current medication regimen   Advised patient on symptoms of hypotension & severe HTN  Limit salt-intake & caffeine. DASH diet discussed, minimize stress level  Weight reduction efforts via improved diet & increased exercise

## 2025-03-24 NOTE — ASSESSMENT & PLAN NOTE
09/24 Lipid panel   Cholesterol 290, Triglycerides 157, , HDL 43  02/25 Lipid panel   Cholesterol 207, Triglycerides 175, , HDL 44    Current medications: Omega-3 and Simvastatin 10mg   Continue current medication regimen   Lifestyle modification: Incorporate regular exercise, avoid sugars and simple carbohydrates, weight loss and choosing healthier fats

## 2025-03-24 NOTE — PATIENT INSTRUCTIONS
"Patient Education     High triglycerides   The Basics   Written by the doctors and editors at [facility]   What are high triglycerides? -- These are fat-like substances in the blood. Everyone has them, but some people have too much of them. This can cause high levels of triglycerides in the blood, also called \"high triglycerides.\"  People with high triglycerides have a higher risk of heart attack, stroke, and other health problems than people with normal triglycerides.  People with very high triglycerides can get inflammation in the pancreas. The pancreas is an organ that makes hormones and fluids to help the body break down food. When the pancreas gets inflamed, it can cause serious health problems.  What should my triglyceride level be? -- Ask your doctor or nurse what your triglyceride level should be. In general, levels are:   Normal - Less than 150 mg/dL. (If you live outside of the , triglycerides are measured differently. The normal level is less than 1.7 mmol/L.)   A little high - 150 to 499 mg/dL (1.7 to 5.6 mmol/L).   Moderately high - 500 to 999 mg/dL (5.6 to 11.3 mmol/L).   Very high - Greater than 1000 mg/dL (>11.3 mmol/L).  Am I at higher risk for heart attack or stroke? -- Yes. Having high triglycerides increases your risk of having a heart attack or stroke. But this is just one of many things that can increase your risk. You are also at higher risk if you:   Have a high level of \"LDL\" cholesterol - Your LDL level affects your risk of a heart attack or stroke even more than your triglycerides.   Smoke cigarettes   Have high blood pressure   Have excess body weight   Have a parent or sibling who got heart disease at a young age. (Young, in this case, means younger than 55 for males and younger than 65 for females.)   Are male - Females are at risk too, but males have a higher risk.   Are older   Have diabetes, especially if you have trouble managing your blood sugar  Your doctor can talk to you about " your personal risk of having a heart attack or stroke. There are things you can do to lower your risk.  Should I take medicine to lower my triglycerides? -- Not everyone who has high triglycerides needs to take medicine to lower them. Your doctor or nurse will decide if you need medicine. It depends on how high your triglycerides are and your age, family history, and other health concerns.  Medicines can include:   Statins - Many people with high triglycerides also have high LDL cholesterol. Medicines called statins lower LDL cholesterol levels and can reduce the risk of having a heart attack or stroke.   Medicines to lower triglyceride levels - These include fenofibrate (sample brand names: Antara, Lopid), fish oil (brand name: Lovaza), or, rarely, nicotinic acid (sample brand names: Niacor, Niaspan).  The medicine you take will depend on your triglyceride levels and other factors. If your triglycerides are very high, you might need more than 1 medicine.  Can I lower my triglycerides without medicines? -- Yes, you might be able to lower high triglycerides if you:   Lose weight (if you have excess body weight) - Your doctor or nurse can help you do this in a healthy way.   Get regular exercise.   Make healthy diet changes - For example, you can:   Avoid foods and drinks with a lot of sugar and carbohydrates. These include white bread, fruit juice, soda, and sweets.   Limit red meat, butter, fried foods, cheese, oils, and nuts.   Eat fish that contain high levels of omega-3 fatty acids, such as salmon, herring, or anchovies.   Manage your blood sugar - If you have diabetes, keeping your blood sugar in a healthy range can help lower your triglycerides.   Limit alcohol - This generally means no more than 2 drinks a day for males, and no more than 1 drink a day for females. If your triglycerides are over 500 mg/dL, ask your doctor or nurse if it is safe to drink alcohol.  All topics are updated as new evidence becomes  available and our peer review process is complete.  This topic retrieved from "[facility]" on: Mar 27, 2024.  Topic 38619 Version 17.0  Release: 32.2.4 - C32.85  © 2024 Up[employer]Date, Inc. and/or its affiliates. All rights reserved.  Consumer Information Use and Disclaimer   Disclaimer: This generalized information is a limited summary of diagnosis, treatment, and/or medication information. It is not meant to be comprehensive and should be used as a tool to help the user understand and/or assess potential diagnostic and treatment options. It does NOT include all information about conditions, treatments, medications, side effects, or risks that may apply to a specific patient. It is not intended to be medical advice or a substitute for the medical advice, diagnosis, or treatment of a health care provider based on the health care provider's examination and assessment of a patient's specific and unique circumstances. Patients must speak with a health care provider for complete information about their health, medical questions, and treatment options, including any risks or benefits regarding use of medications. This information does not endorse any treatments or medications as safe, effective, or approved for treating a specific patient. Up[employer]Date, Inc. and its affiliates disclaim any warranty or liability relating to this information or the use thereof.The use of this information is governed by the Terms of Use, available at https://www.[employer] Communicationsuwer.com/en/know/clinical-effectiveness-terms. 2024© UpToDate, Inc. and its affiliates and/or licensors. All rights reserved.  Copyright   © 2024 Up[employer]Date, Inc. and/or its affiliates. All rights reserved.

## 2025-03-24 NOTE — PROGRESS NOTES
Name: Marie Lloyd      : 1965      MRN: [id number]  Encounter Provider: SHEEBA Poole  Encounter Date: 3/24/2025   Encounter department: [facility]  :  Assessment & Plan  Follow-up exam         Gastroesophageal reflux disease without esophagitis  Current medications: Omeprazole 40mg   Continue current medication regimen          Acquired hypothyroidism   Current TSH 3.00  Current medications: Levothyroxine 50mcg   Continue current medication regimen          Elevated BP without diagnosis of hypertension  BP today 108/80  Goal BP <140/90, patient is at goal   See attached image for BP at home   Current medication: Lisinopril 5mg  At last visit patient was started on lisinopril 5mg   Continue current medication regimen   Advised patient on symptoms of hypotension & severe HTN  Limit salt-intake & caffeine. DASH diet discussed, minimize stress level  Weight reduction efforts via improved diet & increased exercise         Other hyperlipidemia   Lipid panel   Cholesterol 290, Triglycerides 157, , HDL 43   Lipid panel   Cholesterol 207, Triglycerides 175, , HDL 44    Current medications: Omega-3 and Simvastatin 10mg   Continue current medication regimen   Lifestyle modification: Incorporate regular exercise, avoid sugars and simple carbohydrates, weight loss and choosing healthier fats         Upper respiratory tract infection, unspecified type  Make sure you have a thermometer and if you feel chills or sweats check it and write it down.  Take Tylenol for fevers, body aches, and headaches  Drink plenty of fluids to stay well hydrated at least 2 L per day  Hot water with lemon or honey, warm tea, Can drink Gatorade/Powerade zero, mix with water    Use over-the-counter saline nasal spray/allergy medication for congestion, runny nose, and postnasal drip  Over the counter Mucinex to help clear mucus. Delsym is a cough suppressant.   Vicks to the  "front/back of the chest bottom of the feet with socks   Stay out of work/school until afebrile >24 hours without use of antipyretics                History of Present Illness   Follow up on chronic conditions   URI symptoms for 2 days       Review of Systems   Constitutional:  Negative for activity change, chills, fatigue and fever.   HENT:  Negative for congestion, ear pain, rhinorrhea, sore throat and trouble swallowing.    Eyes:  Negative for pain and visual disturbance.   Respiratory:  Negative for cough, chest tightness and shortness of breath.    Cardiovascular:  Negative for chest pain, palpitations and leg swelling.   Gastrointestinal:  Negative for abdominal pain, constipation, diarrhea, nausea and vomiting.   Genitourinary:  Negative for difficulty urinating, dysuria, hematuria and urgency.   Musculoskeletal:  Negative for arthralgias and back pain.   Skin:  Negative for color change and rash.   Neurological:  Negative for dizziness, seizures, syncope and headaches.   Psychiatric/Behavioral:  Negative for dysphoric mood. The patient is not nervous/anxious.    All other systems reviewed and are negative.      Objective   /80 (BP Location: Left arm, Patient Position: Sitting, Cuff Size: Adult)   Pulse 81   Temp 97.8 °F (36.6 °C) (Temporal)   Ht 5' 1\" (1.549 m)   Wt 71.4 kg (157 lb 6.4 oz)   SpO2 98%   BMI 29.74 kg/m²      Physical Exam  Vitals and nursing note reviewed.   Constitutional:       General: She is not in acute distress.     Appearance: Normal appearance. She is well-developed and normal weight.   HENT:      Head: Normocephalic and atraumatic.      Right Ear: Tympanic membrane, ear canal and external ear normal. There is no impacted cerumen.      Left Ear: Tympanic membrane, ear canal and external ear normal. There is no impacted cerumen.      Nose: Nose normal.      Mouth/Throat:      Mouth: Mucous membranes are moist.      Pharynx: Oropharynx is clear.   Eyes:      Extraocular " Movements: Extraocular movements intact.      Conjunctiva/sclera: Conjunctivae normal.      Pupils: Pupils are equal, round, and reactive to light.   Cardiovascular:      Rate and Rhythm: Normal rate and regular rhythm.      Pulses: Normal pulses.      Heart sounds: Normal heart sounds. No murmur heard.  Pulmonary:      Effort: Pulmonary effort is normal. No respiratory distress.      Breath sounds: Normal breath sounds.   Abdominal:      General: Bowel sounds are normal.      Palpations: Abdomen is soft.      Tenderness: There is no abdominal tenderness.   Musculoskeletal:         General: Normal range of motion.      Cervical back: Normal range of motion and neck supple.      Right lower leg: No edema.      Left lower leg: No edema.   Skin:     General: Skin is warm and dry.      Capillary Refill: Capillary refill takes less than 2 seconds.   Neurological:      General: No focal deficit present.      Mental Status: She is alert and oriented to person, place, and time. Mental status is at baseline.   Psychiatric:         Mood and Affect: Mood normal.         Behavior: Behavior normal.         Thought Content: Thought content normal.         Judgment: Judgment normal.       Administrative Statements   I have spent a total time of 40 minutes in caring for this patient on the day of the visit/encounter including Diagnostic results, Prognosis, Risks and benefits of tx options, Instructions for management, Patient and family education, Importance of tx compliance, Risk factor reductions, Impressions, Counseling / Coordination of care, Documenting in the medical record, Reviewing/placing orders in the medical record (including tests, medications, and/or procedures), and Obtaining or reviewing history  .

## 2025-03-31 ENCOUNTER — HOSPITAL ENCOUNTER (OUTPATIENT)
Dept: MAMMOGRAPHY | Facility: MEDICAL CENTER | Age: 60
Discharge: HOME | End: 2025-03-31
Payer: COMMERCIAL

## 2025-03-31 VITALS — WEIGHT: 157 LBS | HEIGHT: 61 IN | BODY MASS INDEX: 29.64 KG/M2

## 2025-03-31 DIAGNOSIS — Z12.31 ENCOUNTER FOR SCREENING MAMMOGRAM FOR MALIGNANT NEOPLASM OF BREAST: ICD-10-CM

## 2025-03-31 PROCEDURE — 77063 BREAST TOMOSYNTHESIS BI: CPT

## 2025-03-31 PROCEDURE — 77067 SCR MAMMO BI INCL CAD: CPT

## 2025-05-10 DIAGNOSIS — R51.9 NONINTRACTABLE HEADACHE, UNSPECIFIED CHRONICITY PATTERN, UNSPECIFIED HEADACHE TYPE: ICD-10-CM

## 2025-05-10 RX ORDER — LEVOTHYROXINE SODIUM 50 UG/1
50 TABLET ORAL DAILY
Qty: 90 TABLET | Refills: 1 | Status: SHIPPED | OUTPATIENT
Start: 2025-05-10

## 2025-06-30 DIAGNOSIS — K21.9 GASTROESOPHAGEAL REFLUX DISEASE WITHOUT ESOPHAGITIS: ICD-10-CM

## 2025-07-01 RX ORDER — OMEPRAZOLE 40 MG/1
40 CAPSULE, DELAYED RELEASE ORAL DAILY
Qty: 90 CAPSULE | Refills: 1 | Status: SHIPPED | OUTPATIENT
Start: 2025-07-01

## 2025-07-30 DIAGNOSIS — R03.0 ELEVATED BP WITHOUT DIAGNOSIS OF HYPERTENSION: ICD-10-CM

## 2025-07-30 RX ORDER — LISINOPRIL 5 MG/1
5 TABLET ORAL DAILY
Qty: 90 TABLET | Refills: 0 | Status: SHIPPED | OUTPATIENT
Start: 2025-07-30